# Patient Record
Sex: FEMALE | Race: WHITE | NOT HISPANIC OR LATINO | URBAN - METROPOLITAN AREA
[De-identification: names, ages, dates, MRNs, and addresses within clinical notes are randomized per-mention and may not be internally consistent; named-entity substitution may affect disease eponyms.]

---

## 2022-04-24 ENCOUNTER — INPATIENT (INPATIENT)
Facility: HOSPITAL | Age: 77
LOS: 4 days | Discharge: HOME | DRG: 480 | End: 2022-04-29
Attending: INTERNAL MEDICINE | Admitting: HOSPITALIST
Payer: MEDICARE

## 2022-04-24 ENCOUNTER — TRANSCRIPTION ENCOUNTER (OUTPATIENT)
Age: 77
End: 2022-04-24

## 2022-04-24 VITALS
HEART RATE: 70 BPM | SYSTOLIC BLOOD PRESSURE: 156 MMHG | DIASTOLIC BLOOD PRESSURE: 104 MMHG | RESPIRATION RATE: 20 BRPM | WEIGHT: 100.09 LBS | OXYGEN SATURATION: 96 % | TEMPERATURE: 98 F

## 2022-04-24 DIAGNOSIS — S72.002A FRACTURE OF UNSPECIFIED PART OF NECK OF LEFT FEMUR, INITIAL ENCOUNTER FOR CLOSED FRACTURE: ICD-10-CM

## 2022-04-24 LAB
ALBUMIN SERPL ELPH-MCNC: 3.9 G/DL — SIGNIFICANT CHANGE UP (ref 3.3–5.2)
ALP SERPL-CCNC: 69 U/L — SIGNIFICANT CHANGE UP (ref 40–120)
ALT FLD-CCNC: 8 U/L — SIGNIFICANT CHANGE UP
ANION GAP SERPL CALC-SCNC: 14 MMOL/L — SIGNIFICANT CHANGE UP (ref 5–17)
APTT BLD: 36.9 SEC — HIGH (ref 27.5–35.5)
AST SERPL-CCNC: 17 U/L — SIGNIFICANT CHANGE UP
BASOPHILS # BLD AUTO: 0.06 K/UL — SIGNIFICANT CHANGE UP (ref 0–0.2)
BASOPHILS NFR BLD AUTO: 0.5 % — SIGNIFICANT CHANGE UP (ref 0–2)
BILIRUB SERPL-MCNC: 1 MG/DL — SIGNIFICANT CHANGE UP (ref 0.4–2)
BLD GP AB SCN SERPL QL: SIGNIFICANT CHANGE UP
BUN SERPL-MCNC: 17.5 MG/DL — SIGNIFICANT CHANGE UP (ref 8–20)
CALCIUM SERPL-MCNC: 9.7 MG/DL — SIGNIFICANT CHANGE UP (ref 8.6–10.2)
CHLORIDE SERPL-SCNC: 98 MMOL/L — SIGNIFICANT CHANGE UP (ref 98–107)
CO2 SERPL-SCNC: 22 MMOL/L — SIGNIFICANT CHANGE UP (ref 22–29)
CREAT SERPL-MCNC: 0.62 MG/DL — SIGNIFICANT CHANGE UP (ref 0.5–1.3)
EGFR: 91 ML/MIN/1.73M2 — SIGNIFICANT CHANGE UP
EOSINOPHIL # BLD AUTO: 0.07 K/UL — SIGNIFICANT CHANGE UP (ref 0–0.5)
EOSINOPHIL NFR BLD AUTO: 0.6 % — SIGNIFICANT CHANGE UP (ref 0–6)
FLUAV AG NPH QL: SIGNIFICANT CHANGE UP
FLUBV AG NPH QL: SIGNIFICANT CHANGE UP
GLUCOSE SERPL-MCNC: 99 MG/DL — SIGNIFICANT CHANGE UP (ref 70–99)
HCT VFR BLD CALC: 36.8 % — SIGNIFICANT CHANGE UP (ref 34.5–45)
HGB BLD-MCNC: 12.5 G/DL — SIGNIFICANT CHANGE UP (ref 11.5–15.5)
IMM GRANULOCYTES NFR BLD AUTO: 0.4 % — SIGNIFICANT CHANGE UP (ref 0–1.5)
INR BLD: 1.04 RATIO — SIGNIFICANT CHANGE UP (ref 0.88–1.16)
LYMPHOCYTES # BLD AUTO: 0.71 K/UL — LOW (ref 1–3.3)
LYMPHOCYTES # BLD AUTO: 5.7 % — LOW (ref 13–44)
MCHC RBC-ENTMCNC: 29 PG — SIGNIFICANT CHANGE UP (ref 27–34)
MCHC RBC-ENTMCNC: 34 GM/DL — SIGNIFICANT CHANGE UP (ref 32–36)
MCV RBC AUTO: 85.4 FL — SIGNIFICANT CHANGE UP (ref 80–100)
MONOCYTES # BLD AUTO: 0.81 K/UL — SIGNIFICANT CHANGE UP (ref 0–0.9)
MONOCYTES NFR BLD AUTO: 6.5 % — SIGNIFICANT CHANGE UP (ref 2–14)
NEUTROPHILS # BLD AUTO: 10.77 K/UL — HIGH (ref 1.8–7.4)
NEUTROPHILS NFR BLD AUTO: 86.3 % — HIGH (ref 43–77)
PLATELET # BLD AUTO: 277 K/UL — SIGNIFICANT CHANGE UP (ref 150–400)
POTASSIUM SERPL-MCNC: 4.3 MMOL/L — SIGNIFICANT CHANGE UP (ref 3.5–5.3)
POTASSIUM SERPL-SCNC: 4.3 MMOL/L — SIGNIFICANT CHANGE UP (ref 3.5–5.3)
PROT SERPL-MCNC: 6.9 G/DL — SIGNIFICANT CHANGE UP (ref 6.6–8.7)
PROTHROM AB SERPL-ACNC: 12.1 SEC — SIGNIFICANT CHANGE UP (ref 10.5–13.4)
RBC # BLD: 4.31 M/UL — SIGNIFICANT CHANGE UP (ref 3.8–5.2)
RBC # FLD: 15 % — HIGH (ref 10.3–14.5)
RSV RNA NPH QL NAA+NON-PROBE: SIGNIFICANT CHANGE UP
SARS-COV-2 RNA SPEC QL NAA+PROBE: DETECTED
SODIUM SERPL-SCNC: 134 MMOL/L — LOW (ref 135–145)
TROPONIN T SERPL-MCNC: 0.01 NG/ML — SIGNIFICANT CHANGE UP (ref 0–0.06)
WBC # BLD: 12.47 K/UL — HIGH (ref 3.8–10.5)
WBC # FLD AUTO: 12.47 K/UL — HIGH (ref 3.8–10.5)

## 2022-04-24 PROCEDURE — 71045 X-RAY EXAM CHEST 1 VIEW: CPT | Mod: 26

## 2022-04-24 PROCEDURE — 72125 CT NECK SPINE W/O DYE: CPT | Mod: 26,MG

## 2022-04-24 PROCEDURE — 72190 X-RAY EXAM OF PELVIS: CPT | Mod: 26

## 2022-04-24 PROCEDURE — 70450 CT HEAD/BRAIN W/O DYE: CPT | Mod: 26,MG

## 2022-04-24 PROCEDURE — 99223 1ST HOSP IP/OBS HIGH 75: CPT | Mod: 57

## 2022-04-24 PROCEDURE — 93010 ELECTROCARDIOGRAM REPORT: CPT

## 2022-04-24 PROCEDURE — 99497 ADVNCD CARE PLAN 30 MIN: CPT | Mod: 25

## 2022-04-24 PROCEDURE — 99223 1ST HOSP IP/OBS HIGH 75: CPT

## 2022-04-24 PROCEDURE — G1004: CPT

## 2022-04-24 PROCEDURE — 72192 CT PELVIS W/O DYE: CPT | Mod: 26,MG

## 2022-04-24 PROCEDURE — 99285 EMERGENCY DEPT VISIT HI MDM: CPT

## 2022-04-24 RX ORDER — VANCOMYCIN HCL 1 G
750 VIAL (EA) INTRAVENOUS ONCE
Refills: 0 | Status: DISCONTINUED | OUTPATIENT
Start: 2022-04-25 | End: 2022-04-28

## 2022-04-24 RX ORDER — ENOXAPARIN SODIUM 100 MG/ML
40 INJECTION SUBCUTANEOUS ONCE
Refills: 0 | Status: COMPLETED | OUTPATIENT
Start: 2022-04-24 | End: 2022-04-24

## 2022-04-24 RX ORDER — MORPHINE SULFATE 50 MG/1
2 CAPSULE, EXTENDED RELEASE ORAL ONCE
Refills: 0 | Status: DISCONTINUED | OUTPATIENT
Start: 2022-04-24 | End: 2022-04-24

## 2022-04-24 RX ORDER — LEVOTHYROXINE SODIUM 125 MCG
75 TABLET ORAL DAILY
Refills: 0 | Status: DISCONTINUED | OUTPATIENT
Start: 2022-04-24 | End: 2022-04-26

## 2022-04-24 RX ORDER — LISINOPRIL 2.5 MG/1
10 TABLET ORAL DAILY
Refills: 0 | Status: DISCONTINUED | OUTPATIENT
Start: 2022-04-24 | End: 2022-04-29

## 2022-04-24 RX ORDER — CEFAZOLIN SODIUM 1 G
2000 VIAL (EA) INJECTION ONCE
Refills: 0 | Status: DISCONTINUED | OUTPATIENT
Start: 2022-04-25 | End: 2022-04-28

## 2022-04-24 RX ORDER — ACETAMINOPHEN 500 MG
975 TABLET ORAL ONCE
Refills: 0 | Status: COMPLETED | OUTPATIENT
Start: 2022-04-24 | End: 2022-04-24

## 2022-04-24 RX ORDER — BENAZEPRIL HYDROCHLORIDE 40 MG/1
1 TABLET ORAL
Qty: 0 | Refills: 0 | DISCHARGE

## 2022-04-24 RX ORDER — MUPIROCIN 20 MG/G
1 OINTMENT TOPICAL
Refills: 0 | Status: DISCONTINUED | OUTPATIENT
Start: 2022-04-24 | End: 2022-04-29

## 2022-04-24 RX ORDER — DONEPEZIL HYDROCHLORIDE 10 MG/1
10 TABLET, FILM COATED ORAL AT BEDTIME
Refills: 0 | Status: DISCONTINUED | OUTPATIENT
Start: 2022-04-24 | End: 2022-04-27

## 2022-04-24 RX ORDER — MEMANTINE HYDROCHLORIDE 10 MG/1
10 TABLET ORAL
Refills: 0 | Status: DISCONTINUED | OUTPATIENT
Start: 2022-04-24 | End: 2022-04-27

## 2022-04-24 RX ORDER — RISPERIDONE 4 MG/1
0.5 TABLET ORAL AT BEDTIME
Refills: 0 | Status: DISCONTINUED | OUTPATIENT
Start: 2022-04-24 | End: 2022-04-27

## 2022-04-24 RX ORDER — POVIDONE-IODINE 5 %
1 AEROSOL (ML) TOPICAL ONCE
Refills: 0 | Status: COMPLETED | OUTPATIENT
Start: 2022-04-24 | End: 2022-04-25

## 2022-04-24 RX ORDER — TRANEXAMIC ACID 100 MG/ML
1000 INJECTION, SOLUTION INTRAVENOUS ONCE
Refills: 0 | Status: COMPLETED | OUTPATIENT
Start: 2022-04-24 | End: 2022-04-24

## 2022-04-24 RX ADMIN — MORPHINE SULFATE 2 MILLIGRAM(S): 50 CAPSULE, EXTENDED RELEASE ORAL at 17:39

## 2022-04-24 RX ADMIN — ENOXAPARIN SODIUM 40 MILLIGRAM(S): 100 INJECTION SUBCUTANEOUS at 21:28

## 2022-04-24 RX ADMIN — MORPHINE SULFATE 2 MILLIGRAM(S): 50 CAPSULE, EXTENDED RELEASE ORAL at 17:40

## 2022-04-24 RX ADMIN — Medication 975 MILLIGRAM(S): at 14:31

## 2022-04-24 RX ADMIN — TRANEXAMIC ACID 220 MILLIGRAM(S): 100 INJECTION, SOLUTION INTRAVENOUS at 21:28

## 2022-04-24 NOTE — ED PROVIDER NOTE - ATTENDING CONTRIBUTION TO CARE
Leah: I performed a face to face evaluation of this patient and performed a full history and physical examination on the patient.  I agree with the resident's history, physical examination, and plan of the patient unless otherwise noted. My brief assessment is as follows: hx dementia, with fall from bed this morning. unwitnessed, with skin avulsion to left UE. small bruise to right temporal/forehead region, mild left hip pain/bruising. hasn't tried walking since. at mental baseline. no a/c. no midline neck/spine ttp, ctab, rrr, abd benign, full rom throughout, no deformities, neurovascularly intact. ct head/pelvis, imaging. reassess

## 2022-04-24 NOTE — ED ADULT NURSE NOTE - OBJECTIVE STATEMENT
pt to ED with complaints of fall. PMH Dementia. daughter in law ginacia at bedside. DIL states she has cameras to watch pt and she had a fall this AM in her bedroom. pt unable to recall events. as per DIL pt is acting normal. pt presents with bruising next to right eye. bruising and [id number] skin tear on left arm. slight bruising noted over left hip bone.  pt states she is unable to completely extend her left leg. pt with full movement in all other extremities. pt lying in stretcher A+O x2.  pt denies any pain. left arm skin tear bandaged.

## 2022-04-24 NOTE — ED ADULT NURSE REASSESSMENT NOTE - NS ED NURSE REASSESS COMMENT FT1
Assumed care at 2200, POC reviewed. Pt resting in bed comfortably with daughter in law at bedside. Pt AOx2, respirations even and unlabored on RA, skin warm and dry, bruising present around eye and left hip, skin tear resent on left arm. CM and  in place, bed in the lowest position, side rails up. Assumed care at 2200, POC reviewed. Pt resting in bed comfortably with daughter in law at bedside. Pt AOx2, respirations even and unlabored on RA, skin warm and dry, bruising present around eye and left hip, skin tear resent on left arm, purwick in place, skin dry and intact. CM and  in place, bed in the lowest position, side rails up.

## 2022-04-24 NOTE — ED PROVIDER NOTE - PHYSICAL EXAMINATION
General: well appearing, NAD  Head: NC, AT  EENT: EOMI, no scleral icterus  Cardiac: RRR, no apparent murmurs, no lower extremity edema, 2+ radial pulses b/l   Respiratory: CTABL, no respiratory distress   Abdomen: soft, ND, NT, nonperitonitic  MSK/Vascular: ttp over left greater trochanter   Neuro: AAO to self and place but not time, GCS15   Skin: 10 x 5 cm skin tear to dorsal aspect of left forearm, minimal ecchymosis to r temple  Psych: calm, cooperative

## 2022-04-24 NOTE — H&P ADULT - NSHPPHYSICALEXAM_GEN_ALL_CORE
Vital Signs Last 24 Hrs  T(C): 36.6 (24 Apr 2022 23:24), Max: 37.3 (24 Apr 2022 15:53)  T(F): 97.9 (24 Apr 2022 23:24), Max: 99.2 (24 Apr 2022 15:53)  HR: 75 (24 Apr 2022 23:24) (60 - 75)  BP: 125/84 (24 Apr 2022 23:24) (113/97 - 156/104)  BP(mean): --  RR: 18 (24 Apr 2022 23:24) (17 - 20)  SpO2: 94% (24 Apr 2022 23:24) (94% - 97%)

## 2022-04-24 NOTE — H&P ADULT - NSICDXPASTMEDICALHX_GEN_ALL_CORE_FT
PAST MEDICAL HISTORY:  Dementia     History of asthma     HTN (hypertension)     Hypothyroid     Hypothyroidism

## 2022-04-24 NOTE — GOALS OF CARE CONVERSATION - ADVANCED CARE PLANNING - CONVERSATION DETAILS
Advance care directive discussed with son who is the health care proxy. He said he has not had this discussion with his mother in the past but she would have wanted everything done as possible to save her. Patient is full code.

## 2022-04-24 NOTE — ED PROVIDER NOTE - CLINICAL SUMMARY MEDICAL DECISION MAKING FREE TEXT BOX
76 year old female with history of dementia, asthma, and hypothyroidism who presents following a fall. WBC of 12.4. CT head and c spine unremarkable however CT bony pelvis with L impacted subcapital neck fracture. Ortho consulted. Pt unable to ambulate 2/2 pain. Will admit pt for further care and eval.

## 2022-04-24 NOTE — H&P ADULT - ASSESSMENT
77 y/o female with PMH of dementia, hypothyroidism, HTN was brought to the ED for leg pain s/p fall. Found to have mild impacted subcapital femoral neck fracture on CT pelvis. Ortho on board. CT head/cervical: no acute finding.     Subcapital femoral neck fracture s/p fall   Admit to medical floor   CT pelvis as noted above   Ortho on board; planned for OR in AM   Will check ECG (although marked as completed, no ECG was found in MUSE)   Patient with MET > 4 prior to admission     Leukocytosis   WBC: 12.47 with left shift   Likely reactive   Trend WBC     COVID   Patient asymptomatic   Son and daughter-in-law said patient has no contact with anyone   She is not vaccinated   Will repeat test, hold treatment for now   Isolation precaution     HTN  Benazapril 10mg     Asthma   Albuterol PRN     Hypothyroidism   Synthroid 75mcg     Dementia   Aricept 10mg   Memantine ER 28mg (non-formulary) will give 10mg bid   Risperidone 0.5mg HS     Supportive   DVT prophylaxis: Lovenox once given, please continue after the procedure  Diet: NPO after midnight   Code: full     Plan of care discussed with patient, daughter-in-law (Lisa) at bed side and son (Gonzalez) via the phone ([phone]

## 2022-04-24 NOTE — ED ADULT TRIAGE NOTE - CHIEF COMPLAINT QUOTE
Patient presents to ER foe medical evaluation, sustained fall this Am, skin tear to left arm, no other complaints, no LOC/no blood thinners. no active bleeding noted, dressing placed by family. Patient awake and alert, HX of dementia.

## 2022-04-24 NOTE — H&P ADULT - MUSCULOSKELETAL
no joint swelling/no joint erythema/no joint warmth/no calf tenderness/decreased ROM due to pain detailed exam

## 2022-04-24 NOTE — ED PROVIDER NOTE - PROGRESS NOTE DETAILS
Henry: I rechecked the patient. CT c spine and head CT with no acute findings. CT bony pelvis pending. Henry: ortho consulted as pt found to have impacted L subcapital femur fracture. Leah: pt admitted to medicine, no other traumatic injury.

## 2022-04-24 NOTE — ED PROVIDER NOTE - OBJECTIVE STATEMENT
76 year old female with history of dementia and asthma who presents following a fall. The patient lives with her son and daughter in law. They saw her at ~midnight and she was well. Sometime between midnight and 10am however 76 year old female with history of dementia, asthma, and hypothyroidism who presents following a fall. The patient lives with her son and daughter in law. They saw her at ~midnight and she was well. Sometime between midnight and 10am however she had an unwitnessed fall and sustained ecchymosis to R temple, pain/ecchymosis to L hip, and skin tear of L forearm. Pt's family states that she has been unable to ambulate since the fall and they had difficulty removing her pants this morning because of her left hip pain. They agree she is at her baseline mental status. Pt did not receive covid vaccine. quit smoking ~25 years ago.

## 2022-04-24 NOTE — CONSULT NOTE ADULT - SUBJECTIVE AND OBJECTIVE BOX
Pt Name: MIYA WILKINS    MRN: [id number]      Patient is a 76y Female presenting to the emergency department with a chief complaint of left hip pain s/p mechanical fall yesterday. As per son, pt was able to stand and get back in bed, but would not get out of bed and walk since the fall. Pt with dementia, therefore HPI otherwise unreliable at time of exam.    REVIEW OF SYSTEMS    General: Alert, responsive, in NAD    Skin/Breast:     Ophthalmologic:   	  ENMT:	    Respiratory and Thorax:   	   Cardiovascular:	    Gastrointestinal:	     Genitourinary:     Musculoskeletal: see hpi    Neurological:    Psychiatric:     Hematology/Lymphatics:    Endocrine:         PAST MEDICAL & SURGICAL HISTORY:  PAST MEDICAL & SURGICAL HISTORY:      Allergies: No Known Allergies      Medications: mupirocin 2% Ointment 1 Application(s) Both Nostrils two times a day  povidone iodine 5% Nasal Swab 1 Application(s) Both Nostrils once  tranexamic acid IVPB 1000 milliGRAM(s) IV Intermittent once      FAMILY HISTORY:  : non-contributory    Social History: denies etoh abuse    Ambulation: Walking independently [ x ] With Cane [ ] With Walker [ ]  Bedbound [ ]                           12.5   12.47 )-----------( 277      ( 24 Apr 2022 14:39 )             36.8       04-24    134<L>  |  98  |  17.5  ----------------------------<  99  4.3   |  22.0  |  0.62    Ca    9.7      24 Apr 2022 14:39    TPro  6.9  /  Alb  3.9  /  TBili  1.0  /  DBili  x   /  AST  17  /  ALT  8   /  AlkPhos  69  04-24      Vital Signs Last 24 Hrs  T(C): 36.8 (24 Apr 2022 19:55), Max: 37.3 (24 Apr 2022 15:53)  T(F): 98.3 (24 Apr 2022 19:55), Max: 99.2 (24 Apr 2022 15:53)  HR: 71 (24 Apr 2022 19:55) (60 - 71)  BP: 144/89 (24 Apr 2022 19:55) (113/97 - 156/104)  BP(mean): --  RR: 17 (24 Apr 2022 19:55) (17 - 20)  SpO2: 95% (24 Apr 2022 19:55) (95% - 97%)    Daily     Daily       PHYSICAL EXAM:      Appearance: Alert, responsive, in no acute distress.    Neurological: Sensation is grossly intact to light touch. 5/5 motor function of all extremities. No focal deficits or weaknesses found.    Skin: no rash on visible skin. Skin is clean, dry and intact. No bleeding. No abrasions. No ulcerations.    Vascular: 2+ distal pulses. Cap refill < 2 sec. No signs of venous insufficiency or stasis. No extremity ulcerations. No cyanosis.    Musculoskeletal:         Left Upper Extremity:  + NROM. Non-tender. No signs of trauma.        Right Upper Extremity:  + NROM. Non-tender. No signs of trauma.        Left Lower Extremity:  Pt with knees bent in bed. +EHL/FHL intact. Compartments soft and compressible. No open wounds or active bleeding noted. 2+ DP pulse palpated. No open wounds or active bleeding. SILT.       Right Lower Extremity:  + NROM. Non-tender. No signs of trauma.     Imaging Studies:    A/P:  Pt is a  76y Female with a left subcapital femoral neck fx s/p fall yesterday.    PLAN d/w Dr. Small:   * NPO for OR tomorrow  * IV fluids ordered and to start once NPO  * STAT tranexamic acid - as per hip fx protocol  * Pre-operative ABX ordered  * Single dose anticoaguation ordered  * Anesthesia notified  * Swab- covid +  * Bed rest

## 2022-04-25 ENCOUNTER — TRANSCRIPTION ENCOUNTER (OUTPATIENT)
Age: 77
End: 2022-04-25

## 2022-04-25 LAB
ANION GAP SERPL CALC-SCNC: 16 MMOL/L — SIGNIFICANT CHANGE UP (ref 5–17)
BASE EXCESS BLDA CALC-SCNC: -1.8 MMOL/L — SIGNIFICANT CHANGE UP (ref -2–3)
BLOOD GAS COMMENTS ARTERIAL: SIGNIFICANT CHANGE UP
BUN SERPL-MCNC: 16.9 MG/DL — SIGNIFICANT CHANGE UP (ref 8–20)
CALCIUM SERPL-MCNC: 9.6 MG/DL — SIGNIFICANT CHANGE UP (ref 8.6–10.2)
CHLORIDE SERPL-SCNC: 99 MMOL/L — SIGNIFICANT CHANGE UP (ref 98–107)
CO2 SERPL-SCNC: 21 MMOL/L — LOW (ref 22–29)
CREAT SERPL-MCNC: 0.59 MG/DL — SIGNIFICANT CHANGE UP (ref 0.5–1.3)
EGFR: 92 ML/MIN/1.73M2 — SIGNIFICANT CHANGE UP
GLUCOSE SERPL-MCNC: 84 MG/DL — SIGNIFICANT CHANGE UP (ref 70–99)
HCO3 BLDA-SCNC: 23 MMOL/L — SIGNIFICANT CHANGE UP (ref 21–28)
HCT VFR BLD CALC: 33.4 % — LOW (ref 34.5–45)
HGB BLD-MCNC: 10.9 G/DL — LOW (ref 11.5–15.5)
HOROWITZ INDEX BLDA+IHG-RTO: 21 — SIGNIFICANT CHANGE UP
MCHC RBC-ENTMCNC: 28.7 PG — SIGNIFICANT CHANGE UP (ref 27–34)
MCHC RBC-ENTMCNC: 32.6 GM/DL — SIGNIFICANT CHANGE UP (ref 32–36)
MCV RBC AUTO: 87.9 FL — SIGNIFICANT CHANGE UP (ref 80–100)
PCO2 BLDA: 35 MMHG — SIGNIFICANT CHANGE UP (ref 32–35)
PH BLDA: 7.42 — SIGNIFICANT CHANGE UP (ref 7.35–7.45)
PLATELET # BLD AUTO: 240 K/UL — SIGNIFICANT CHANGE UP (ref 150–400)
PO2 BLDA: 144 MMHG — HIGH (ref 83–108)
POTASSIUM SERPL-MCNC: 3.6 MMOL/L — SIGNIFICANT CHANGE UP (ref 3.5–5.3)
POTASSIUM SERPL-SCNC: 3.6 MMOL/L — SIGNIFICANT CHANGE UP (ref 3.5–5.3)
RBC # BLD: 3.8 M/UL — SIGNIFICANT CHANGE UP (ref 3.8–5.2)
RBC # FLD: 15 % — HIGH (ref 10.3–14.5)
SAO2 % BLDA: 100 % — HIGH (ref 94–98)
SODIUM SERPL-SCNC: 136 MMOL/L — SIGNIFICANT CHANGE UP (ref 135–145)
WBC # BLD: 8.25 K/UL — SIGNIFICANT CHANGE UP (ref 3.8–10.5)
WBC # FLD AUTO: 8.25 K/UL — SIGNIFICANT CHANGE UP (ref 3.8–10.5)

## 2022-04-25 PROCEDURE — 93010 ELECTROCARDIOGRAM REPORT: CPT

## 2022-04-25 PROCEDURE — 99233 SBSQ HOSP IP/OBS HIGH 50: CPT

## 2022-04-25 PROCEDURE — 27236 TREAT THIGH FRACTURE: CPT | Mod: LT

## 2022-04-25 DEVICE — PLATE FEM NECK 1H STRL: Type: IMPLANTABLE DEVICE | Status: FUNCTIONAL

## 2022-04-25 DEVICE — BOLT FEM NECK 80MM STRL: Type: IMPLANTABLE DEVICE | Status: FUNCTIONAL

## 2022-04-25 DEVICE — IMPLANTABLE DEVICE: Type: IMPLANTABLE DEVICE | Status: FUNCTIONAL

## 2022-04-25 DEVICE — GWIRE 3.2X400MM SS: Type: IMPLANTABLE DEVICE | Status: FUNCTIONAL

## 2022-04-25 DEVICE — SCREW ANTIROTAT FEM NECK 80MM STRL: Type: IMPLANTABLE DEVICE | Status: FUNCTIONAL

## 2022-04-25 RX ORDER — ACETAMINOPHEN 500 MG
650 TABLET ORAL EVERY 8 HOURS
Refills: 0 | Status: DISCONTINUED | OUTPATIENT
Start: 2022-04-25 | End: 2022-04-29

## 2022-04-25 RX ORDER — ENOXAPARIN SODIUM 100 MG/ML
40 INJECTION SUBCUTANEOUS EVERY 24 HOURS
Refills: 0 | Status: DISCONTINUED | OUTPATIENT
Start: 2022-04-26 | End: 2022-04-29

## 2022-04-25 RX ORDER — ALBUTEROL 90 UG/1
1 AEROSOL, METERED ORAL THREE TIMES A DAY
Refills: 0 | Status: DISCONTINUED | OUTPATIENT
Start: 2022-04-25 | End: 2022-04-29

## 2022-04-25 RX ORDER — POLYETHYLENE GLYCOL 3350 17 G/17G
17 POWDER, FOR SOLUTION ORAL DAILY
Refills: 0 | Status: DISCONTINUED | OUTPATIENT
Start: 2022-04-25 | End: 2022-04-29

## 2022-04-25 RX ORDER — OXYCODONE HYDROCHLORIDE 5 MG/1
5 TABLET ORAL EVERY 4 HOURS
Refills: 0 | Status: DISCONTINUED | OUTPATIENT
Start: 2022-04-25 | End: 2022-04-29

## 2022-04-25 RX ORDER — ONDANSETRON 8 MG/1
4 TABLET, FILM COATED ORAL EVERY 8 HOURS
Refills: 0 | Status: DISCONTINUED | OUTPATIENT
Start: 2022-04-25 | End: 2022-04-29

## 2022-04-25 RX ORDER — MAGNESIUM HYDROXIDE 400 MG/1
30 TABLET, CHEWABLE ORAL DAILY
Refills: 0 | Status: DISCONTINUED | OUTPATIENT
Start: 2022-04-25 | End: 2022-04-29

## 2022-04-25 RX ORDER — ACETAMINOPHEN 500 MG
650 TABLET ORAL EVERY 6 HOURS
Refills: 0 | Status: DISCONTINUED | OUTPATIENT
Start: 2022-04-25 | End: 2022-04-29

## 2022-04-25 RX ORDER — OXYCODONE HYDROCHLORIDE 5 MG/1
2.5 TABLET ORAL EVERY 4 HOURS
Refills: 0 | Status: DISCONTINUED | OUTPATIENT
Start: 2022-04-25 | End: 2022-04-29

## 2022-04-25 RX ORDER — SODIUM CHLORIDE 9 MG/ML
1000 INJECTION INTRAMUSCULAR; INTRAVENOUS; SUBCUTANEOUS
Refills: 0 | Status: DISCONTINUED | OUTPATIENT
Start: 2022-04-25 | End: 2022-04-28

## 2022-04-25 RX ORDER — LANOLIN ALCOHOL/MO/W.PET/CERES
3 CREAM (GRAM) TOPICAL AT BEDTIME
Refills: 0 | Status: DISCONTINUED | OUTPATIENT
Start: 2022-04-25 | End: 2022-04-29

## 2022-04-25 RX ORDER — ALBUTEROL 90 UG/1
2 AEROSOL, METERED ORAL ONCE
Refills: 0 | Status: COMPLETED | OUTPATIENT
Start: 2022-04-25 | End: 2022-04-25

## 2022-04-25 RX ORDER — POTASSIUM CHLORIDE 20 MEQ
20 PACKET (EA) ORAL ONCE
Refills: 0 | Status: DISCONTINUED | OUTPATIENT
Start: 2022-04-25 | End: 2022-04-26

## 2022-04-25 RX ORDER — SODIUM CHLORIDE 9 MG/ML
1000 INJECTION, SOLUTION INTRAVENOUS
Refills: 0 | Status: DISCONTINUED | OUTPATIENT
Start: 2022-04-25 | End: 2022-04-25

## 2022-04-25 RX ORDER — OXYCODONE HYDROCHLORIDE 5 MG/1
5 TABLET ORAL ONCE
Refills: 0 | Status: DISCONTINUED | OUTPATIENT
Start: 2022-04-25 | End: 2022-04-25

## 2022-04-25 RX ORDER — CEFAZOLIN SODIUM 1 G
2000 VIAL (EA) INJECTION EVERY 8 HOURS
Refills: 0 | Status: COMPLETED | OUTPATIENT
Start: 2022-04-26 | End: 2022-04-26

## 2022-04-25 RX ORDER — SODIUM CHLORIDE 9 MG/ML
1000 INJECTION INTRAMUSCULAR; INTRAVENOUS; SUBCUTANEOUS
Refills: 0 | Status: DISCONTINUED | OUTPATIENT
Start: 2022-04-25 | End: 2022-04-25

## 2022-04-25 RX ORDER — HYDROMORPHONE HYDROCHLORIDE 2 MG/ML
0.5 INJECTION INTRAMUSCULAR; INTRAVENOUS; SUBCUTANEOUS
Refills: 0 | Status: DISCONTINUED | OUTPATIENT
Start: 2022-04-25 | End: 2022-04-25

## 2022-04-25 RX ORDER — ONDANSETRON 8 MG/1
4 TABLET, FILM COATED ORAL EVERY 6 HOURS
Refills: 0 | Status: DISCONTINUED | OUTPATIENT
Start: 2022-04-25 | End: 2022-04-29

## 2022-04-25 RX ORDER — ENOXAPARIN SODIUM 100 MG/ML
40 INJECTION SUBCUTANEOUS EVERY 24 HOURS
Refills: 0 | Status: DISCONTINUED | OUTPATIENT
Start: 2022-04-25 | End: 2022-04-25

## 2022-04-25 RX ORDER — SENNA PLUS 8.6 MG/1
2 TABLET ORAL AT BEDTIME
Refills: 0 | Status: DISCONTINUED | OUTPATIENT
Start: 2022-04-25 | End: 2022-04-29

## 2022-04-25 RX ADMIN — SENNA PLUS 2 TABLET(S): 8.6 TABLET ORAL at 23:14

## 2022-04-25 RX ADMIN — HYDROMORPHONE HYDROCHLORIDE 0.5 MILLIGRAM(S): 2 INJECTION INTRAMUSCULAR; INTRAVENOUS; SUBCUTANEOUS at 19:23

## 2022-04-25 RX ADMIN — HYDROMORPHONE HYDROCHLORIDE 0.5 MILLIGRAM(S): 2 INJECTION INTRAMUSCULAR; INTRAVENOUS; SUBCUTANEOUS at 19:00

## 2022-04-25 RX ADMIN — RISPERIDONE 0.5 MILLIGRAM(S): 4 TABLET ORAL at 23:16

## 2022-04-25 RX ADMIN — LISINOPRIL 10 MILLIGRAM(S): 2.5 TABLET ORAL at 06:16

## 2022-04-25 RX ADMIN — Medication 75 MICROGRAM(S): at 05:50

## 2022-04-25 RX ADMIN — DONEPEZIL HYDROCHLORIDE 10 MILLIGRAM(S): 10 TABLET, FILM COATED ORAL at 23:15

## 2022-04-25 RX ADMIN — Medication 650 MILLIGRAM(S): at 23:45

## 2022-04-25 RX ADMIN — ALBUTEROL 2 PUFF(S): 90 AEROSOL, METERED ORAL at 19:00

## 2022-04-25 RX ADMIN — Medication 650 MILLIGRAM(S): at 23:15

## 2022-04-25 RX ADMIN — Medication 650 MILLIGRAM(S): at 10:57

## 2022-04-25 RX ADMIN — MUPIROCIN 1 APPLICATION(S): 20 OINTMENT TOPICAL at 05:55

## 2022-04-25 RX ADMIN — Medication 1 APPLICATION(S): at 01:29

## 2022-04-25 RX ADMIN — MEMANTINE HYDROCHLORIDE 10 MILLIGRAM(S): 10 TABLET ORAL at 05:50

## 2022-04-25 NOTE — DISCHARGE NOTE PROVIDER - NSDCCPCAREPLAN_GEN_ALL_CORE_FT
PRINCIPAL DISCHARGE DIAGNOSIS  Diagnosis: Fracture of neck of left femur  Assessment and Plan of Treatment: s/p ORIF.   follow up with ortho in 2 weeks      SECONDARY DISCHARGE DIAGNOSES  Diagnosis: Unwitnessed fall  Assessment and Plan of Treatment: fall precautions.  pt

## 2022-04-25 NOTE — ED ADULT NURSE REASSESSMENT NOTE - NS ED NURSE REASSESS COMMENT FT1
Pt sleeping, resting comfortably, arouses to verbal stimuli, AOx2, respirations even and unlabored on RA, skin warm and dry.

## 2022-04-25 NOTE — PROGRESS NOTE ADULT - ASSESSMENT
75 y/o female with PMH of dementia, hypothyroidism, HTN was brought to the ED for leg pain s/p fall. Found to have mild impacted subcapital femoral neck fracture on CT pelvis. Ortho on board. CT head/cervical: no acute finding.     1-Subcapital femoral neck fracture s/p fall at home   orthopedics on board   pt s for OR today   d/w son at the bedside updated plan and treatment       2-Leukocytosis   WBC: 12.47 with left shift   Likely reactive   Trend WBC     3-COVID positive   afebrile   Patient asymptomatic , not hypoxic   isolation precautions     4-HTN  on Benazapril 10mg   controlled   hold post op to avoid hypotension     5-Asthma   Albuterol PRN     6-Hypothyroidism   Synthroid 75mcg   check TSH     7-Dementia   Aricept 10mg   Memantine ER 28mg (non-formulary) will give 10mg bid   Risperidone 0.5mg HS     Supportive   DVT prophylaxis: Lovenox once given  resume post op per surgery team   Diet: NPO   Code: full code     Plan of care discussed with son  at bed side ; son (Gonzalez) via the phone ([phone]     pt is optimized for surgery at present

## 2022-04-25 NOTE — DISCHARGE NOTE PROVIDER - CARE PROVIDERS DIRECT ADDRESSES
[email or web address]_Unknown

## 2022-04-25 NOTE — DISCHARGE NOTE PROVIDER - NSDCMRMEDTOKEN_GEN_ALL_CORE_FT
Aricept 10 mg oral tablet: 1 tab(s) orally once a day (at bedtime)  benazepril 10 mg oral tablet: 1 tab(s) orally once a day  levothyroxine 75 mcg (0.075 mg) oral tablet: 1 tab(s) orally once a day  memantine 28 mg oral capsule, extended release: 1 cap(s) orally once a day  risperiDONE 0.5 mg oral tablet: 1 tab(s) orally once a day (at bedtime)   acetaminophen 325 mg oral tablet: 2 tab(s) orally every 6 hours, As needed, Temp greater or equal to 38C (100.4F), Mild Pain (1 - 3)  albuterol 90 mcg/inh inhalation aerosol: 1 puff(s) inhaled 3 times a day, As needed, Shortness of Breath and/or Wheezing  benazepril 10 mg oral tablet: 1 tab(s) orally once a day  cholecalciferol oral tablet: 800 unit(s) orally once a day  cyanocobalamin 1000 mcg oral tablet: 1 tab(s) orally once a day  enoxaparin: 40 milligram(s) subcutaneous once a day   for dvt ppx   Euthyrox 100 mcg (0.1 mg) oral tablet: 1 tab(s) orally once a day  melatonin 3 mg oral tablet: 1 tab(s) orally once a day (at bedtime), As needed, Insomnia  oxyCODONE 5 mg oral tablet: 1 tab(s) orally every 4 hours, As needed, Severe Pain (7 - 10)  polyethylene glycol 3350 oral powder for reconstitution: 17 gram(s) orally once a day  QUEtiapine 25 mg oral tablet: 1 tab(s) orally 2 times a day  senna oral tablet: 2 tab(s) orally once a day (at bedtime)

## 2022-04-25 NOTE — DISCHARGE NOTE PROVIDER - HOSPITAL COURSE
75 y/o female with PMH of dementia, hypothyroidism, HTN was brought to the ED for leg pain s/p fall. Found to have mild impacted subcapital femoral neck fracture on CT pelvis. Ortho on board. CT head/cervical: no acute finding.  a/w Subcapital left  femoral neck fracture s/p fall at home . now s.p orif. patient also noted to have Leukocytosis / likely reactive / now  resolved .  s/p  periop iv abxs as per ortho as per post op protocol . now completed     patient also tested COVID positive . remained afebrile , Patient asymptomatic , not hypoxic ,  repeat covid 4/29 neg.  discussed with inefction prevention, patient  has two tests negative results and can come off isolation     patient with hx of Hypothyroidism . tsh greater than 40 . patient WAS Synthroid 75mcg --> now  incr to 100mcg    vit b12 def  - replete     agitation / ams / likely delirium with underlying dementia  / now improved . as per family patient gets more agitated with aricept , namanda and risperidal. now started on seroquel bid. improved agitation.     now dcd to yary.

## 2022-04-25 NOTE — DISCHARGE NOTE PROVIDER - NSDCFUADDINST_GEN_ALL_CORE_FT
ORTHOPEDIC FOLLOW UP RECOMMENDATIONS: The patient will be seen in the office between 2-3 weeks for wound check. Sutures/Staples/Tape will be removed at that time. Patient may shower after post-op day #5. The dressing is to be removed on post op day #9. IF THE DRESSING BECOMES SOILED BEFORE THE REMOVAL DATE, CHANGE WITH A SIMILAR DRESSING. IF THE DRESSING BECOMES STAINED WITH DISCHARGE, CONTACT THE OFFICE FOR FURTHER DIRECTIONS.  The patient will contact the office if the wound becomes red, has increasing pain, develops bleeding or discharge, an injury occurs, or has other concerns. The patient will continue PT for gait training. The patient will continue LOVENOX for 4 weeks (or as per primary team) for blood clot prevention. The patient will take Colace while taking oxycodone to prevent narcotic associated constipation.  Additionally, increase water intake (drink at least 8 glasses of water daily) and try adding fiber to the diet by eating fruits, vegetables and foods that are rich in grains. If constipation is experienced, contact the medical/primary care provider to discuss further treatment options. The patient is FULL weight bearing.

## 2022-04-25 NOTE — DISCHARGE NOTE PROVIDER - PROVIDER TOKENS
PROVIDER:[TOKEN:[[id number]]] PROVIDER:[TOKEN:[[id number]],FOLLOWUP:[2 weeks]],FREE:[LAST:[primary care],PHONE:[(   )    -],FAX:[(   )    -],FOLLOWUP:[1-3 days]]

## 2022-04-25 NOTE — DISCHARGE NOTE PROVIDER - CARE PROVIDER_API CALL
Leonid Freeman)  Orthopaedic Surgery  [address] [place] [address]  Phone: [phone]  Fax: [phone]  Follow Up Time:    Leonid Freeman)  Orthopaedic Surgery  [address] [place] [address]  Phone: [phone]  Fax: [phone]  Follow Up Time: 2 weeks    primary care,   Phone: (   )    -  Fax: (   )    -  Follow Up Time: 1-3 days

## 2022-04-25 NOTE — ED ADULT NURSE REASSESSMENT NOTE - COMFORT CARE
plan of care explained/po fluids offered/repositioned/warm blanket provided
plan of care explained/repositioned
no

## 2022-04-25 NOTE — PROGRESS NOTE ADULT - SUBJECTIVE AND OBJECTIVE BOX
Patient is a 76y old  Female who presents with a chief complaint of     Patient seen and examined at bedside. No overnight events reported.     ALLERGIES:  No Known Allergies    MEDICATIONS  (STANDING):  ceFAZolin   IVPB 2000 milliGRAM(s) IV Intermittent once  donepezil 10 milliGRAM(s) Oral at bedtime  levothyroxine 75 MICROGram(s) Oral daily  lisinopril 10 milliGRAM(s) Oral daily  memantine 10 milliGRAM(s) Oral two times a day  mupirocin 2% Ointment 1 Application(s) Both Nostrils two times a day  risperiDONE   Tablet 0.5 milliGRAM(s) Oral at bedtime  sodium chloride 0.9%. 1000 milliLiter(s) (75 mL/Hr) IV Continuous <Continuous>  vancomycin  IVPB 750 milliGRAM(s) IV Intermittent once    MEDICATIONS  (PRN):  acetaminophen     Tablet .. 650 milliGRAM(s) Oral every 6 hours PRN Temp greater or equal to 38C (100.4F), Mild Pain (1 - 3)  ALBUTerol    90 MICROgram(s) HFA Inhaler 1 Puff(s) Inhalation three times a day PRN Shortness of Breath and/or Wheezing  aluminum hydroxide/magnesium hydroxide/simethicone Suspension 30 milliLiter(s) Oral every 4 hours PRN Dyspepsia  melatonin 3 milliGRAM(s) Oral at bedtime PRN Insomnia  ondansetron Injectable 4 milliGRAM(s) IV Push every 8 hours PRN Nausea and/or Vomiting    Vital Signs Last 24 Hrs  T(F): 97.6 (25 Apr 2022 11:00), Max: 99.2 (24 Apr 2022 15:53)  HR: 69 (25 Apr 2022 11:00) (60 - 79)  BP: 126/81 (25 Apr 2022 11:00) (113/97 - 156/104)  RR: 18 (25 Apr 2022 11:00) (17 - 20)  SpO2: 99% (25 Apr 2022 11:00) (94% - 99%)  I&O's Summary      PHYSICAL EXAM:  General:   ENT:   Neck:   Lungs:   Cardio: RRR, S1/S2, No murmur  Abdomen: Soft, Nontender, Nondistended; Bowel sounds present  Extremities: No calf tenderness, No pitting edema    LABS:                        10.9   8.25  )-----------( 240      ( 25 Apr 2022 03:54 )             33.4     04-25    136  |  99  |  16.9  ----------------------------<  84  3.6   |  21.0  |  0.59    Ca    9.6      25 Apr 2022 03:54    TPro  6.9  /  Alb  3.9  /  TBili  1.0  /  DBili  x   /  AST  17  /  ALT  8   /  AlkPhos  69  04-24          PT/INR - ( 24 Apr 2022 22:14 )   PT: 12.1 sec;   INR: 1.04 ratio         PTT - ( 24 Apr 2022 22:14 )  PTT:36.9 sec                                  RADIOLOGY & ADDITIONAL TESTS:       Patient is a 76y old  Female admitted for fall femur fracture   pt is with dementia poor historian   denies any pain at present seen in am       chart H and P reviewed     ALLERGIES:  No Known Allergies    MEDICATIONS  (STANDING):  ceFAZolin   IVPB 2000 milliGRAM(s) IV Intermittent once  donepezil 10 milliGRAM(s) Oral at bedtime  levothyroxine 75 MICROGram(s) Oral daily  lisinopril 10 milliGRAM(s) Oral daily  memantine 10 milliGRAM(s) Oral two times a day  mupirocin 2% Ointment 1 Application(s) Both Nostrils two times a day  risperiDONE   Tablet 0.5 milliGRAM(s) Oral at bedtime  sodium chloride 0.9%. 1000 milliLiter(s) (75 mL/Hr) IV Continuous <Continuous>  vancomycin  IVPB 750 milliGRAM(s) IV Intermittent once    MEDICATIONS  (PRN):  acetaminophen     Tablet .. 650 milliGRAM(s) Oral every 6 hours PRN Temp greater or equal to 38C (100.4F), Mild Pain (1 - 3)  ALBUTerol    90 MICROgram(s) HFA Inhaler 1 Puff(s) Inhalation three times a day PRN Shortness of Breath and/or Wheezing  aluminum hydroxide/magnesium hydroxide/simethicone Suspension 30 milliLiter(s) Oral every 4 hours PRN Dyspepsia  melatonin 3 milliGRAM(s) Oral at bedtime PRN Insomnia  ondansetron Injectable 4 milliGRAM(s) IV Push every 8 hours PRN Nausea and/or Vomiting    Vital Signs Last 24 Hrs  T(F): 97.6 (25 Apr 2022 11:00), Max: 99.2 (24 Apr 2022 15:53)  HR: 69 (25 Apr 2022 11:00) (60 - 79)  BP: 126/81 (25 Apr 2022 11:00) (113/97 - 156/104)  RR: 18 (25 Apr 2022 11:00) (17 - 20)  SpO2: 99% (25 Apr 2022 11:00) (94% - 99%)  I&O's Summary      PHYSICAL EXAM:  General: awake alert , no distress   Neck: supple, no JVD   Lungs: CTA bilateral   Cardio: RRR, S1/S2, No murmur  Abdomen: Soft, Nontender, Nondistended; Bowel sounds present  Extremities: No calf tenderness, No pitting edema  left forearm dressing in place       LABS:                        10.9   8.25  )-----------( 240      ( 25 Apr 2022 03:54 )             33.4     04-25    136  |  99  |  16.9  ----------------------------<  84  3.6   |  21.0  |  0.59    Ca    9.6      25 Apr 2022 03:54    TPro  6.9  /  Alb  3.9  /  TBili  1.0  /  DBili  x   /  AST  17  /  ALT  8   /  AlkPhos  69  04-24          PT/INR - ( 24 Apr 2022 22:14 )   PT: 12.1 sec;   INR: 1.04 ratio         PTT - ( 24 Apr 2022 22:14 )  PTT:36.9 sec                                  RADIOLOGY & ADDITIONAL TESTS:

## 2022-04-25 NOTE — BRIEF OPERATIVE NOTE - COMMENTS
post-op plan: WBAT, PT/OT, ancef per protocol, contralateral SCD, DVT prophy per medical team, likely f/u prn due to health issues and COVID 19 pandemic

## 2022-04-25 NOTE — PROGRESS NOTE ADULT - SUBJECTIVE AND OBJECTIVE BOX
Orthopedic PA Postop Note  Patient S/P LEFT HIP FNS  Patient in bed comfortable   LEFT Leg  Dressing C/D/I  DP Pulse intact  Calf Soft NT  Dorsi/Plantar Flexion/EHL/FHL Intact  Sensation intact to light touch    Vital Signs Last 24 Hrs  T(C): 36.7 (25 Apr 2022 14:20), Max: 36.8 (24 Apr 2022 19:55)  T(F): 98 (25 Apr 2022 14:20), Max: 98.3 (24 Apr 2022 19:55)  HR: 70 (25 Apr 2022 14:20) (69 - 79)  BP: 131/74 (25 Apr 2022 14:20) (125/84 - 155/85)  BP(mean): --  RR: 16 (25 Apr 2022 14:20) (16 - 18)  SpO2: 98% (25 Apr 2022 14:20) (94% - 99%)        A/P:  S/P LEFT HIP FNS  1. DVTP - LOVENOX  2. Physical Therapy   3. Pain Control as clinically indicated

## 2022-04-25 NOTE — DISCHARGE NOTE PROVIDER - ATTENDING DISCHARGE PHYSICAL EXAMINATION:
PHYSICAL EXAM:  General: awake, confused, no distress   Neck: supple, no JVD   Lungs: CTA bilateral   Cardio: RRR, S1/S2, No murmur  Abdomen: Soft, Nontender, Nondistended; Bowel sounds present  Extremities: No calf tenderness, No pitting edema  Neruro aao x2 -3 pleasant

## 2022-04-26 LAB
24R-OH-CALCIDIOL SERPL-MCNC: 13.4 NG/ML — LOW (ref 30–80)
ANION GAP SERPL CALC-SCNC: 18 MMOL/L — HIGH (ref 5–17)
BUN SERPL-MCNC: 15.5 MG/DL — SIGNIFICANT CHANGE UP (ref 8–20)
CALCIUM SERPL-MCNC: 9.1 MG/DL — SIGNIFICANT CHANGE UP (ref 8.6–10.2)
CHLORIDE SERPL-SCNC: 98 MMOL/L — SIGNIFICANT CHANGE UP (ref 98–107)
CO2 SERPL-SCNC: 19 MMOL/L — LOW (ref 22–29)
CREAT SERPL-MCNC: 0.76 MG/DL — SIGNIFICANT CHANGE UP (ref 0.5–1.3)
EGFR: 80 ML/MIN/1.73M2 — SIGNIFICANT CHANGE UP
FOLATE SERPL-MCNC: 7.2 NG/ML — SIGNIFICANT CHANGE UP
GLUCOSE SERPL-MCNC: 87 MG/DL — SIGNIFICANT CHANGE UP (ref 70–99)
HCT VFR BLD CALC: 34.1 % — LOW (ref 34.5–45)
HGB BLD-MCNC: 10.8 G/DL — LOW (ref 11.5–15.5)
IRON SATN MFR SERPL: 26 UG/DL — LOW (ref 37–145)
IRON SATN MFR SERPL: 9 % — LOW (ref 14–50)
MCHC RBC-ENTMCNC: 28.3 PG — SIGNIFICANT CHANGE UP (ref 27–34)
MCHC RBC-ENTMCNC: 31.7 GM/DL — LOW (ref 32–36)
MCV RBC AUTO: 89.3 FL — SIGNIFICANT CHANGE UP (ref 80–100)
PHOSPHATE SERPL-MCNC: 3.3 MG/DL — SIGNIFICANT CHANGE UP (ref 2.4–4.7)
PLATELET # BLD AUTO: 247 K/UL — SIGNIFICANT CHANGE UP (ref 150–400)
POTASSIUM SERPL-MCNC: 3.9 MMOL/L — SIGNIFICANT CHANGE UP (ref 3.5–5.3)
POTASSIUM SERPL-SCNC: 3.9 MMOL/L — SIGNIFICANT CHANGE UP (ref 3.5–5.3)
RBC # BLD: 3.82 M/UL — SIGNIFICANT CHANGE UP (ref 3.8–5.2)
RBC # FLD: 14.9 % — HIGH (ref 10.3–14.5)
SODIUM SERPL-SCNC: 135 MMOL/L — SIGNIFICANT CHANGE UP (ref 135–145)
TIBC SERPL-MCNC: 297 UG/DL — SIGNIFICANT CHANGE UP (ref 220–430)
TRANSFERRIN SERPL-MCNC: 208 MG/DL — SIGNIFICANT CHANGE UP (ref 192–382)
TSH SERPL-MCNC: 40.32 UIU/ML — HIGH (ref 0.27–4.2)
VIT B12 SERPL-MCNC: 273 PG/ML — SIGNIFICANT CHANGE UP (ref 232–1245)
WBC # BLD: 9.66 K/UL — SIGNIFICANT CHANGE UP (ref 3.8–10.5)
WBC # FLD AUTO: 9.66 K/UL — SIGNIFICANT CHANGE UP (ref 3.8–10.5)

## 2022-04-26 PROCEDURE — 99232 SBSQ HOSP IP/OBS MODERATE 35: CPT

## 2022-04-26 PROCEDURE — 36000 PLACE NEEDLE IN VEIN: CPT

## 2022-04-26 RX ORDER — IRON SUCROSE 20 MG/ML
200 INJECTION, SOLUTION INTRAVENOUS EVERY 24 HOURS
Refills: 0 | Status: COMPLETED | OUTPATIENT
Start: 2022-04-26 | End: 2022-04-28

## 2022-04-26 RX ORDER — LEVOTHYROXINE SODIUM 125 MCG
100 TABLET ORAL DAILY
Refills: 0 | Status: DISCONTINUED | OUTPATIENT
Start: 2022-04-26 | End: 2022-04-29

## 2022-04-26 RX ORDER — PREGABALIN 225 MG/1
1000 CAPSULE ORAL DAILY
Refills: 0 | Status: DISCONTINUED | OUTPATIENT
Start: 2022-04-26 | End: 2022-04-29

## 2022-04-26 RX ADMIN — LISINOPRIL 10 MILLIGRAM(S): 2.5 TABLET ORAL at 05:09

## 2022-04-26 RX ADMIN — RISPERIDONE 0.5 MILLIGRAM(S): 4 TABLET ORAL at 22:08

## 2022-04-26 RX ADMIN — Medication 650 MILLIGRAM(S): at 22:08

## 2022-04-26 RX ADMIN — MEMANTINE HYDROCHLORIDE 10 MILLIGRAM(S): 10 TABLET ORAL at 18:13

## 2022-04-26 RX ADMIN — DONEPEZIL HYDROCHLORIDE 10 MILLIGRAM(S): 10 TABLET, FILM COATED ORAL at 22:09

## 2022-04-26 RX ADMIN — Medication 100 MILLIGRAM(S): at 10:44

## 2022-04-26 RX ADMIN — SODIUM CHLORIDE 75 MILLILITER(S): 9 INJECTION INTRAMUSCULAR; INTRAVENOUS; SUBCUTANEOUS at 01:18

## 2022-04-26 RX ADMIN — OXYCODONE HYDROCHLORIDE 2.5 MILLIGRAM(S): 5 TABLET ORAL at 02:58

## 2022-04-26 RX ADMIN — MUPIROCIN 1 APPLICATION(S): 20 OINTMENT TOPICAL at 18:14

## 2022-04-26 RX ADMIN — MEMANTINE HYDROCHLORIDE 10 MILLIGRAM(S): 10 TABLET ORAL at 05:12

## 2022-04-26 RX ADMIN — IRON SUCROSE 110 MILLIGRAM(S): 20 INJECTION, SOLUTION INTRAVENOUS at 11:14

## 2022-04-26 RX ADMIN — ENOXAPARIN SODIUM 40 MILLIGRAM(S): 100 INJECTION SUBCUTANEOUS at 05:09

## 2022-04-26 RX ADMIN — SENNA PLUS 2 TABLET(S): 8.6 TABLET ORAL at 22:09

## 2022-04-26 RX ADMIN — Medication 650 MILLIGRAM(S): at 23:06

## 2022-04-26 RX ADMIN — Medication 3 MILLIGRAM(S): at 22:08

## 2022-04-26 RX ADMIN — Medication 75 MICROGRAM(S): at 05:09

## 2022-04-26 RX ADMIN — Medication 100 MILLIGRAM(S): at 01:17

## 2022-04-26 RX ADMIN — MUPIROCIN 1 APPLICATION(S): 20 OINTMENT TOPICAL at 06:52

## 2022-04-26 NOTE — OCCUPATIONAL THERAPY INITIAL EVALUATION ADULT - REFERRAL TO ANOTHER SERVICE NEEDED, OT EVAL
caregiver respite resources - son describes progressive difficulty providing level of care pt needs.  "I want what's best for her.  I'm not sure what that is."/social work

## 2022-04-26 NOTE — PHYSICAL THERAPY INITIAL EVALUATION ADULT - ADDITIONAL COMMENTS
Pt is a poor historian. States she lives with her son, who works during the day, in a house with 2 KITTY and a flight of stairs to bedroom. No DME.

## 2022-04-26 NOTE — OCCUPATIONAL THERAPY INITIAL EVALUATION ADULT - VISUAL ACUITY
Pt reports having glasses (for watching TV) but unable to locate since fall.  Pt's son will further investigate to allow her to watch TV with more ease.

## 2022-04-26 NOTE — OCCUPATIONAL THERAPY INITIAL EVALUATION ADULT - BED MOBILITY/TRANSFERS, PREVIOUS LEVEL OF FUNCTION, OT EVAL
pt's son describes pt as needing distant supervision.  She tends to push medical equipment away despite rationale for use.

## 2022-04-26 NOTE — PROGRESS NOTE ADULT - NUTRITIONAL ASSESSMENT
75 y/o female with PMH of dementia, hypothyroidism, HTN was brought to the ED for leg pain s/p fall. Found to have mild impacted subcapital femoral neck fracture on CT pelvis. Ortho on board. CT head/cervical: no acute finding.     1-Subcapital femoral neck fracture s/p fall at home   orthopedics on board   pt s for OR today   d/w son at the bedside updated plan and treatment       2-Leukocytosis   WBC: 12.47 with left shift   Likely reactive   Trend WBC     3-COVID positive   afebrile   Patient asymptomatic , not hypoxic   isolation precautions     4-HTN  on Benazapril 10mg   controlled   hold post op to avoid hypotension     5-Asthma   Albuterol PRN     6-Hypothyroidism   Synthroid 75mcg   check TSH     7-Dementia   Aricept 10mg   Memantine ER 28mg (non-formulary) will give 10mg bid   Risperidone 0.5mg HS     Supportive   DVT prophylaxis: Lovenox once given  resume post op per surgery team   Diet: NPO   Code: full code     Plan of care discussed with son  at bed side ; son (Gonzalez) via the phone ([phone]     pt is optimized for surgery at present

## 2022-04-26 NOTE — OCCUPATIONAL THERAPY INITIAL EVALUATION ADULT - ORIENTATION, REHAB EVAL
At time of eval, son present.  Pt states she's in "hospital" and keeps referring to "[employer]" which is where she worked  as per son.  Pt believes she still works./person/place

## 2022-04-26 NOTE — OCCUPATIONAL THERAPY INITIAL EVALUATION ADULT - ADDITIONAL COMMENTS
Pt unable to provide reliable information at time of eval.  Pt's son present and provided the following information:  Pt lives in private home with 2 steps to enter, no HR.  Once inside there are no stairs she needs to manage.  Pt relies on family for community mobility.  She no longer drives.  Pt already has RW, SAC, commode, tub bench, grab bar in tub.  However, pt does not want to use devices and sporadically removes them from her living area.  Pt's son reports having cameras in all rooms of pt's living area to keep an eye on pt.  Son works nights, daughter in law works days and other family assist as needed to provide supervision.  Son reports "it's getting harder to keep her safe.  I need to make more adjustments to the home but I'm not sure what's best for her."  Son reports pt's sleeping schedule as chaotic, some nights she'll sleep 20 hours, other nights she doesn't sleep at all.  Pt resistive to assist due to "pride."

## 2022-04-26 NOTE — OCCUPATIONAL THERAPY INITIAL EVALUATION ADULT - RANGE OF MOTION EXAMINATION, UPPER EXTREMITY
arthritic changes observed bilateral hands but ROM WFL./bilateral UE Active ROM was WNL (within normal limits)

## 2022-04-26 NOTE — PROGRESS NOTE ADULT - SUBJECTIVE AND OBJECTIVE BOX
ORTHOPEDIC POST-OP PROGRESS NOTE:    Name: MIYA WILKINS    MR #: [id number]    Procedure: Left hip femoral neck system  Surgeon: Lydia COTO  DOS: 4/25/22      Pt comfortable without complaints, pain controlled. Denies numbness/tingling. patient will be participating in PT.    Vital Signs Last 24 Hrs  T(C): 36.5 (04-26-22 @ 05:42), Max: 36.5 (04-26-22 @ 05:42)  T(F): 97.7 (04-26-22 @ 05:42), Max: 97.7 (04-26-22 @ 05:42)  HR: 71 (04-26-22 @ 05:42) (71 - 71)  BP: 108/65 (04-26-22 @ 05:42) (108/65 - 108/65)  BP(mean): --  RR: 18 (04-26-22 @ 05:42) (18 - 18)  SpO2: 93% (04-26-22 @ 05:42) (93% - 93%)    General Exam:    General: Pt Alert and oriented, NAD, controlled pain.    LLE:   Dressings C/D/I. No bleeding. No erythema.    Visible Skin: No erythema. No wound dehiscence    Pulses: 2+ dorsalis pedis pulse. Cap refill < 2 sec.    Sensation: Grossly intact to light touch without deficit.    Motor: +EHL/FHL/AT/GC        A/P: 76y Female  POD#1  s/p Left hip FNS    - ortho Stable  - Pain Control  - DVT ppx as prescribed  - PT eval  - Weight bearing status: WBAT  - continue rest of care per primary team

## 2022-04-26 NOTE — PROGRESS NOTE ADULT - SUBJECTIVE AND OBJECTIVE BOX
MIYA WILKINS    [id number]    76y      Female    INTERVAL HPI/OVERNIGHT EVENTS: patient being seen for femur fracture, patient is s.p orif post op day #1    patient seen at bedside and is pleasantly confused    REVIEW OF SYSTEMS:    unable to obtain accurate ros, patient denies pain     Vital Signs Last 24 Hrs  T(C): 36.6 (26 Apr 2022 11:35), Max: 36.8 (25 Apr 2022 18:30)  T(F): 97.8 (26 Apr 2022 11:35), Max: 98.2 (25 Apr 2022 18:30)  HR: 94 (26 Apr 2022 11:35) (70 - 94)  BP: 144/88 (26 Apr 2022 11:35) (108/65 - 188/85)  BP(mean): --  RR: 18 (26 Apr 2022 11:35) (13 - 18)  SpO2: 92% (26 Apr 2022 11:35) (92% - 100%)    PHYSICAL EXAM:    General: awake, confused, no distress   Neck: supple, no JVD   Lungs: CTA bilateral   Cardio: RRR, S1/S2, No murmur  Abdomen: Soft, Nontender, Nondistended; Bowel sounds present  Extremities: No calf tenderness, No pitting edema  Neruro aao x1-2    LABS:                        10.8   9.66  )-----------( 247      ( 26 Apr 2022 06:24 )             34.1     04-26    135  |  98  |  15.5  ----------------------------<  87  3.9   |  19.0<L>  |  0.76    Ca    9.1      26 Apr 2022 06:24  Phos  3.3     04-26    TPro  6.9  /  Alb  3.9  /  TBili  1.0  /  DBili  x   /  AST  17  /  ALT  8   /  AlkPhos  69  04-24    PT/INR - ( 24 Apr 2022 22:14 )   PT: 12.1 sec;   INR: 1.04 ratio         PTT - ( 24 Apr 2022 22:14 )  PTT:36.9 sec        MEDICATIONS  (STANDING):  acetaminophen     Tablet .. 650 milliGRAM(s) Oral every 8 hours  ceFAZolin   IVPB 2000 milliGRAM(s) IV Intermittent once  cyanocobalamin 1000 MICROGram(s) Oral daily  donepezil 10 milliGRAM(s) Oral at bedtime  enoxaparin Injectable 40 milliGRAM(s) SubCutaneous every 24 hours  iron sucrose IVPB 200 milliGRAM(s) IV Intermittent every 24 hours  levothyroxine 100 MICROGram(s) Oral daily  lisinopril 10 milliGRAM(s) Oral daily  memantine 10 milliGRAM(s) Oral two times a day  mupirocin 2% Ointment 1 Application(s) Both Nostrils two times a day  polyethylene glycol 3350 17 Gram(s) Oral daily  risperiDONE   Tablet 0.5 milliGRAM(s) Oral at bedtime  senna 2 Tablet(s) Oral at bedtime  sodium chloride 0.9%. 1000 milliLiter(s) (75 mL/Hr) IV Continuous <Continuous>  vancomycin  IVPB 750 milliGRAM(s) IV Intermittent once    MEDICATIONS  (PRN):  acetaminophen     Tablet .. 650 milliGRAM(s) Oral every 6 hours PRN Temp greater or equal to 38C (100.4F), Mild Pain (1 - 3)  ALBUTerol    90 MICROgram(s) HFA Inhaler 1 Puff(s) Inhalation three times a day PRN Shortness of Breath and/or Wheezing  aluminum hydroxide/magnesium hydroxide/simethicone Suspension 30 milliLiter(s) Oral every 4 hours PRN Dyspepsia  magnesium hydroxide Suspension 30 milliLiter(s) Oral daily PRN Constipation  melatonin 3 milliGRAM(s) Oral at bedtime PRN Insomnia  melatonin 3 milliGRAM(s) Oral at bedtime PRN Insomnia  ondansetron Injectable 4 milliGRAM(s) IV Push every 8 hours PRN Nausea and/or Vomiting  ondansetron Injectable 4 milliGRAM(s) IV Push every 6 hours PRN Nausea and/or Vomiting  oxyCODONE    IR 2.5 milliGRAM(s) Oral every 4 hours PRN Moderate Pain (4 - 6)  oxyCODONE    IR 5 milliGRAM(s) Oral every 4 hours PRN Severe Pain (7 - 10)      RADIOLOGY & ADDITIONAL TESTS:

## 2022-04-26 NOTE — PROGRESS NOTE ADULT - ASSESSMENT
MIYA WILKINS    [id number]    76y      Female    INTERVAL HPI/OVERNIGHT EVENTS: patient being seen for femur fracture s/p orif post op day #1, asymptomatic covid and dementia.     patient seen at bedside and is in nad, pleasantly demented    REVIEW OF SYSTEMS:    unable to obtain secondary to metnal status    Vital Signs Last 24 Hrs  T(C): 36.6 (26 Apr 2022 11:35), Max: 36.8 (25 Apr 2022 18:30)  T(F): 97.8 (26 Apr 2022 11:35), Max: 98.2 (25 Apr 2022 18:30)  HR: 94 (26 Apr 2022 11:35) (70 - 94)  BP: 144/88 (26 Apr 2022 11:35) (108/65 - 188/85)  BP(mean): --  RR: 18 (26 Apr 2022 11:35) (13 - 18)  SpO2: 92% (26 Apr 2022 11:35) (92% - 100%)    PHYSICAL EXAM:    General: awake alert , no distress   Neck: supple, no JVD   Lungs: CTA bilateral   Cardio: RRR, S1/S2, No murmur  Abdomen: Soft, Nontender, Nondistended; Bowel sounds present  Extremities: No calf tenderness, No pitting edema, dressing clean   neuro aao x1         LABS:                        10.8   9.66  )-----------( 247      ( 26 Apr 2022 06:24 )             34.1     04-26    135  |  98  |  15.5  ----------------------------<  87  3.9   |  19.0<L>  |  0.76    Ca    9.1      26 Apr 2022 06:24  Phos  3.3     04-26    TPro  6.9  /  Alb  3.9  /  TBili  1.0  /  DBili  x   /  AST  17  /  ALT  8   /  AlkPhos  69  04-24    PT/INR - ( 24 Apr 2022 22:14 )   PT: 12.1 sec;   INR: 1.04 ratio         PTT - ( 24 Apr 2022 22:14 )  PTT:36.9 sec        MEDICATIONS  (STANDING):  acetaminophen     Tablet .. 650 milliGRAM(s) Oral every 8 hours  ceFAZolin   IVPB 2000 milliGRAM(s) IV Intermittent once  cyanocobalamin 1000 MICROGram(s) Oral daily  donepezil 10 milliGRAM(s) Oral at bedtime  enoxaparin Injectable 40 milliGRAM(s) SubCutaneous every 24 hours  iron sucrose IVPB 200 milliGRAM(s) IV Intermittent every 24 hours  levothyroxine 100 MICROGram(s) Oral daily  lisinopril 10 milliGRAM(s) Oral daily  memantine 10 milliGRAM(s) Oral two times a day  mupirocin 2% Ointment 1 Application(s) Both Nostrils two times a day  polyethylene glycol 3350 17 Gram(s) Oral daily  risperiDONE   Tablet 0.5 milliGRAM(s) Oral at bedtime  senna 2 Tablet(s) Oral at bedtime  sodium chloride 0.9%. 1000 milliLiter(s) (75 mL/Hr) IV Continuous <Continuous>  vancomycin  IVPB 750 milliGRAM(s) IV Intermittent once    MEDICATIONS  (PRN):  acetaminophen     Tablet .. 650 milliGRAM(s) Oral every 6 hours PRN Temp greater or equal to 38C (100.4F), Mild Pain (1 - 3)  ALBUTerol    90 MICROgram(s) HFA Inhaler 1 Puff(s) Inhalation three times a day PRN Shortness of Breath and/or Wheezing  aluminum hydroxide/magnesium hydroxide/simethicone Suspension 30 milliLiter(s) Oral every 4 hours PRN Dyspepsia  magnesium hydroxide Suspension 30 milliLiter(s) Oral daily PRN Constipation  melatonin 3 milliGRAM(s) Oral at bedtime PRN Insomnia  melatonin 3 milliGRAM(s) Oral at bedtime PRN Insomnia  ondansetron Injectable 4 milliGRAM(s) IV Push every 8 hours PRN Nausea and/or Vomiting  ondansetron Injectable 4 milliGRAM(s) IV Push every 6 hours PRN Nausea and/or Vomiting  oxyCODONE    IR 2.5 milliGRAM(s) Oral every 4 hours PRN Moderate Pain (4 - 6)  oxyCODONE    IR 5 milliGRAM(s) Oral every 4 hours PRN Severe Pain (7 - 10)      RADIOLOGY & ADDITIONAL TESTS:   77 y/o female with PMH of dementia, hypothyroidism, HTN was brought to the ED for leg pain s/p fall. Found to have mild impacted subcapital femoral neck fracture on CT pelvis. Ortho on board. CT head/cervical: no acute finding. Patient is s.p orif post op day #1    #Subcapital femoral neck fracture s/p fall at home   orthopedics following  - s.p orif post opd ay #1  - dvt prop and pain control     #Leukocytosis   WBC: 12.47 with left shift   resolved    #COVID positive   afebrile   Patient asymptomatic , not hypoxic   isolation precautions   - repeat covid for am     #HTN  on Benazapril 10mg   controlled     #Hypothyroidism   tsh greater than 40   - send thyroid panel for am   Synthroid 75mcg --> incr to 100mcg    #vit b12 def  - replete    #Dementia   Aricept 10mg   Memantine ER 28mg (non-formulary) will give 10mg bid   Risperidone 0.5mg HS     Supportive   DVT prophylaxis: Lovenox   Code: full code     left message with son  (Gonzalez) via the phone ([phone]   pt consult and will vandana ivory yary

## 2022-04-26 NOTE — OCCUPATIONAL THERAPY INITIAL EVALUATION ADULT - LEVEL OF INDEPENDENCE: DRESS LOWER BODY, OT EVAL
guarding to don/doff socks.  Assist with pants and diaper management./moderate assist (50% patients effort)

## 2022-04-26 NOTE — OCCUPATIONAL THERAPY INITIAL EVALUATION ADULT - PERSONAL SAFETY AND JUDGMENT, REHAB EVAL
resistive to assist at times, resistive to use of medical devices, decreased awareness of functional status/impaired

## 2022-04-26 NOTE — PROCEDURE NOTE - ADDITIONAL PROCEDURE DETAILS
US Guided 20g IV placed in right upper arm. +flash, good blood return, flushes easily. Patient tolerated procedure well with no immediate complications. Tourniquet removed, all sharps disposed of appropriately, bed rails raised and bed lowered.

## 2022-04-26 NOTE — OCCUPATIONAL THERAPY INITIAL EVALUATION ADULT - ANTICIPATED DISCHARGE DISPOSITION, OT EVAL
Home with 24/7 assist vs MIESHA pending pt's functional progress and family's ability to provide this level of assist.

## 2022-04-26 NOTE — PHYSICAL THERAPY INITIAL EVALUATION ADULT - PLANNED THERAPY INTERVENTIONS, PT EVAL
stairs/balance training/bed mobility training/gait training/postural re-education/ROM/strengthening/transfer training

## 2022-04-27 LAB
ALBUMIN SERPL ELPH-MCNC: 3.5 G/DL — SIGNIFICANT CHANGE UP (ref 3.3–5.2)
ALP SERPL-CCNC: 64 U/L — SIGNIFICANT CHANGE UP (ref 40–120)
ALT FLD-CCNC: <5 U/L — SIGNIFICANT CHANGE UP
ANION GAP SERPL CALC-SCNC: 16 MMOL/L — SIGNIFICANT CHANGE UP (ref 5–17)
AST SERPL-CCNC: 20 U/L — SIGNIFICANT CHANGE UP
BASOPHILS # BLD AUTO: 0 K/UL — SIGNIFICANT CHANGE UP (ref 0–0.2)
BASOPHILS NFR BLD AUTO: 0 % — SIGNIFICANT CHANGE UP (ref 0–2)
BILIRUB SERPL-MCNC: 0.7 MG/DL — SIGNIFICANT CHANGE UP (ref 0.4–2)
BUN SERPL-MCNC: 9.9 MG/DL — SIGNIFICANT CHANGE UP (ref 8–20)
CALCIUM SERPL-MCNC: 9.2 MG/DL — SIGNIFICANT CHANGE UP (ref 8.6–10.2)
CHLORIDE SERPL-SCNC: 99 MMOL/L — SIGNIFICANT CHANGE UP (ref 98–107)
CO2 SERPL-SCNC: 20 MMOL/L — LOW (ref 22–29)
CREAT SERPL-MCNC: 0.63 MG/DL — SIGNIFICANT CHANGE UP (ref 0.5–1.3)
EGFR: 91 ML/MIN/1.73M2 — SIGNIFICANT CHANGE UP
EOSINOPHIL # BLD AUTO: 0 K/UL — SIGNIFICANT CHANGE UP (ref 0–0.5)
EOSINOPHIL NFR BLD AUTO: 0 % — SIGNIFICANT CHANGE UP (ref 0–6)
GIANT PLATELETS BLD QL SMEAR: PRESENT — SIGNIFICANT CHANGE UP
GLUCOSE SERPL-MCNC: 79 MG/DL — SIGNIFICANT CHANGE UP (ref 70–99)
HCT VFR BLD CALC: 32.7 % — LOW (ref 34.5–45)
HGB BLD-MCNC: 10.6 G/DL — LOW (ref 11.5–15.5)
LYMPHOCYTES # BLD AUTO: 0.31 K/UL — LOW (ref 1–3.3)
LYMPHOCYTES # BLD AUTO: 4.4 % — LOW (ref 13–44)
MAGNESIUM SERPL-MCNC: 1.8 MG/DL — SIGNIFICANT CHANGE UP (ref 1.8–2.6)
MANUAL SMEAR VERIFICATION: SIGNIFICANT CHANGE UP
MCHC RBC-ENTMCNC: 28.9 PG — SIGNIFICANT CHANGE UP (ref 27–34)
MCHC RBC-ENTMCNC: 32.4 GM/DL — SIGNIFICANT CHANGE UP (ref 32–36)
MCV RBC AUTO: 89.1 FL — SIGNIFICANT CHANGE UP (ref 80–100)
MONOCYTES # BLD AUTO: 0.37 K/UL — SIGNIFICANT CHANGE UP (ref 0–0.9)
MONOCYTES NFR BLD AUTO: 5.2 % — SIGNIFICANT CHANGE UP (ref 2–14)
NEUTROPHILS # BLD AUTO: 6.44 K/UL — SIGNIFICANT CHANGE UP (ref 1.8–7.4)
NEUTROPHILS NFR BLD AUTO: 90.4 % — HIGH (ref 43–77)
OVALOCYTES BLD QL SMEAR: SLIGHT — SIGNIFICANT CHANGE UP
PLAT MORPH BLD: NORMAL — SIGNIFICANT CHANGE UP
PLATELET # BLD AUTO: 268 K/UL — SIGNIFICANT CHANGE UP (ref 150–400)
POIKILOCYTOSIS BLD QL AUTO: SLIGHT — SIGNIFICANT CHANGE UP
POLYCHROMASIA BLD QL SMEAR: SLIGHT — SIGNIFICANT CHANGE UP
POTASSIUM SERPL-MCNC: 3.6 MMOL/L — SIGNIFICANT CHANGE UP (ref 3.5–5.3)
POTASSIUM SERPL-SCNC: 3.6 MMOL/L — SIGNIFICANT CHANGE UP (ref 3.5–5.3)
PROT SERPL-MCNC: 6.4 G/DL — LOW (ref 6.6–8.7)
RBC # BLD: 3.67 M/UL — LOW (ref 3.8–5.2)
RBC # FLD: 14.8 % — HIGH (ref 10.3–14.5)
RBC BLD AUTO: ABNORMAL
SARS-COV-2 RNA SPEC QL NAA+PROBE: SIGNIFICANT CHANGE UP
SCHISTOCYTES BLD QL AUTO: SLIGHT — SIGNIFICANT CHANGE UP
SODIUM SERPL-SCNC: 135 MMOL/L — SIGNIFICANT CHANGE UP (ref 135–145)
T3 SERPL-MCNC: 47 NG/DL — LOW (ref 80–200)
T4 AB SER-ACNC: 6.5 UG/DL — SIGNIFICANT CHANGE UP (ref 4.5–12)
WBC # BLD: 7.12 K/UL — SIGNIFICANT CHANGE UP (ref 3.8–10.5)
WBC # FLD AUTO: 7.12 K/UL — SIGNIFICANT CHANGE UP (ref 3.8–10.5)

## 2022-04-27 PROCEDURE — 99232 SBSQ HOSP IP/OBS MODERATE 35: CPT

## 2022-04-27 RX ORDER — CHOLECALCIFEROL (VITAMIN D3) 125 MCG
800 CAPSULE ORAL DAILY
Refills: 0 | Status: DISCONTINUED | OUTPATIENT
Start: 2022-04-27 | End: 2022-04-29

## 2022-04-27 RX ADMIN — Medication 100 MICROGRAM(S): at 05:51

## 2022-04-27 RX ADMIN — Medication 650 MILLIGRAM(S): at 05:51

## 2022-04-27 RX ADMIN — SODIUM CHLORIDE 75 MILLILITER(S): 9 INJECTION INTRAMUSCULAR; INTRAVENOUS; SUBCUTANEOUS at 18:02

## 2022-04-27 RX ADMIN — ENOXAPARIN SODIUM 40 MILLIGRAM(S): 100 INJECTION SUBCUTANEOUS at 05:51

## 2022-04-27 RX ADMIN — Medication 650 MILLIGRAM(S): at 13:43

## 2022-04-27 RX ADMIN — MUPIROCIN 1 APPLICATION(S): 20 OINTMENT TOPICAL at 05:51

## 2022-04-27 RX ADMIN — SENNA PLUS 2 TABLET(S): 8.6 TABLET ORAL at 21:37

## 2022-04-27 RX ADMIN — POLYETHYLENE GLYCOL 3350 17 GRAM(S): 17 POWDER, FOR SOLUTION ORAL at 12:40

## 2022-04-27 RX ADMIN — SODIUM CHLORIDE 75 MILLILITER(S): 9 INJECTION INTRAMUSCULAR; INTRAVENOUS; SUBCUTANEOUS at 00:11

## 2022-04-27 RX ADMIN — MEMANTINE HYDROCHLORIDE 10 MILLIGRAM(S): 10 TABLET ORAL at 05:51

## 2022-04-27 RX ADMIN — Medication 650 MILLIGRAM(S): at 22:00

## 2022-04-27 RX ADMIN — IRON SUCROSE 110 MILLIGRAM(S): 20 INJECTION, SOLUTION INTRAVENOUS at 05:58

## 2022-04-27 RX ADMIN — MUPIROCIN 1 APPLICATION(S): 20 OINTMENT TOPICAL at 18:58

## 2022-04-27 RX ADMIN — LISINOPRIL 10 MILLIGRAM(S): 2.5 TABLET ORAL at 05:51

## 2022-04-27 RX ADMIN — PREGABALIN 1000 MICROGRAM(S): 225 CAPSULE ORAL at 12:39

## 2022-04-27 RX ADMIN — Medication 650 MILLIGRAM(S): at 21:38

## 2022-04-27 RX ADMIN — Medication 650 MILLIGRAM(S): at 06:51

## 2022-04-27 NOTE — PROGRESS NOTE ADULT - ASSESSMENT
77 y/o female with PMH of dementia, hypothyroidism, HTN was brought to the ED for leg pain s/p fall. Found to have mild impacted subcapital femoral neck fracture on CT pelvis. Ortho on board. CT head/cervical: no acute finding. Patient is s.p orif post op    #Subcapital femoral neck fracture s/p fall at home   - orthopedics following  - s.p orif  - dvt prop and pain control     #Leukocytosis / likely reactive / now improved   -WBC: 12.47 with left shift   -resolved  - x 3 abxs as per ortho as per post op protocol     #COVID positive   -afebrile   -Patient asymptomatic , not hypoxic   -isolation precautions   - repeat covid for am       #HTN  -on Benazapril 10mg   -controlled     #Hypothyroidism   -tsh greater than 40   - send thyroid panel for am   - Synthroid 75mcg --> incr to 100mcg    #vit b12 def  - replete    # agitation / ams / likely delirium with underlying dementia    - dcd Aricept 10mg  Memantine ER 28mg (non-formulary) will give 10mg bid  Risperidone 0.5mg HS. as per daughter causes more agitation.   - started on seroquel bid     Supportive   DVT prophylaxis: Lovenox   Code: full code     plan for yary in 1- 2 days

## 2022-04-27 NOTE — PROGRESS NOTE ADULT - SUBJECTIVE AND OBJECTIVE BOX
Vital Signs Last 24 Hrs  T(C): 36.6 (27 Apr 2022 04:09), Max: 37.1 (26 Apr 2022 17:39)  T(F): 97.9 (27 Apr 2022 04:09), Max: 98.7 (26 Apr 2022 17:39)  HR: 87 (27 Apr 2022 04:09) (80 - 94)  BP: 163/85 (27 Apr 2022 04:09) (143/94 - 163/85)  BP(mean): --  RR: 18 (27 Apr 2022 04:09) (18 - 18)  SpO2: 96% (27 Apr 2022 04:09) (92% - 96%)Procedure: Left hip femoral neck system, pod #2  Surgeon: Lydia COTO  DOS: 4/25/22      Pt comfortable without complaints, pain controlled. Denies numbness/tingling        General Exam:    General: Pt Alert and oriented, NAD, controlled pain.    LLE:   Dressings C/D/I. No bleeding. No erythema. new dressing placed    Visible Skin: No erythema. No wound dehiscence    Pulses: 2+ dorsalis pedis pulse. Cap refill < 2 sec.    Sensation: Grossly intact to light touch without deficit.    Motor: +EHL/FHL/AT/GC                          10.6   7.12  )-----------( 268      ( 27 Apr 2022 07:06 )             32.7   04-27    135  |  99  |  9.9  ----------------------------<  79  3.6   |  20.0<L>  |  0.63    Ca    9.2      27 Apr 2022 07:06  Phos  3.3     04-26  Mg     1.8     04-27    TPro  6.4<L>  /  Alb  3.5  /  TBili  0.7  /  DBili  x   /  AST  20  /  ALT  <5  /  AlkPhos  64  04-27        A/P: 76y Female  POD#2  s/p Left hip FNS    - ortho Stable  - Pain Control  - DVT ppx as prescribed  - PT eval  - Weight bearing status: WBAT  - continue rest of care per primary team

## 2022-04-27 NOTE — PROGRESS NOTE ADULT - SUBJECTIVE AND OBJECTIVE BOX
MIYA WILKINS    [id number]    76y      Female    INTERVAL HPI/OVERNIGHT EVENTS: patient being seen for femur fracture, patient is s.p orif , patient was mildly agitated this am , wrist restrains applied.     Vital Signs Last 24 Hrs  T(C): 36.6 (27 Apr 2022 04:09), Max: 37.1 (26 Apr 2022 17:39)  T(F): 97.9 (27 Apr 2022 04:09), Max: 98.7 (26 Apr 2022 17:39)  HR: 87 (27 Apr 2022 04:09) (80 - 87)  BP: 163/85 (27 Apr 2022 04:09) (143/94 - 163/85)  BP(mean): --  RR: 18 (27 Apr 2022 04:09) (18 - 18)  SpO2: 96% (27 Apr 2022 04:09) (94% - 96%)    PHYSICAL EXAM:  General: awake, confused, no distress   Neck: supple, no JVD   Lungs: CTA bilateral   Cardio: RRR, S1/S2, No murmur  Abdomen: Soft, Nontender, Nondistended; Bowel sounds present  Extremities: No calf tenderness, No pitting edema  Neruro aao x1-2                            10.6   7.12  )-----------( 268      ( 27 Apr 2022 07:06 )             32.7     04-27    135  |  99  |  9.9  ----------------------------<  79  3.6   |  20.0<L>  |  0.63    Ca    9.2      27 Apr 2022 07:06  Phos  3.3     04-26  Mg     1.8     04-27    TPro  6.4<L>  /  Alb  3.5  /  TBili  0.7  /  DBili  x   /  AST  20  /  ALT  <5  /  AlkPhos  64  04-27      MEDICATIONS  (STANDING):  acetaminophen     Tablet .. 650 milliGRAM(s) Oral every 8 hours  ceFAZolin   IVPB 2000 milliGRAM(s) IV Intermittent once  cholecalciferol 800 Unit(s) Oral daily  cyanocobalamin 1000 MICROGram(s) Oral daily  enoxaparin Injectable 40 milliGRAM(s) SubCutaneous every 24 hours  iron sucrose IVPB 200 milliGRAM(s) IV Intermittent every 24 hours  levothyroxine 100 MICROGram(s) Oral daily  lisinopril 10 milliGRAM(s) Oral daily  mupirocin 2% Ointment 1 Application(s) Both Nostrils two times a day  polyethylene glycol 3350 17 Gram(s) Oral daily  senna 2 Tablet(s) Oral at bedtime  sodium chloride 0.9%. 1000 milliLiter(s) (75 mL/Hr) IV Continuous <Continuous>  vancomycin  IVPB 750 milliGRAM(s) IV Intermittent once    MEDICATIONS  (PRN):  acetaminophen     Tablet .. 650 milliGRAM(s) Oral every 6 hours PRN Temp greater or equal to 38C (100.4F), Mild Pain (1 - 3)  ALBUTerol    90 MICROgram(s) HFA Inhaler 1 Puff(s) Inhalation three times a day PRN Shortness of Breath and/or Wheezing  aluminum hydroxide/magnesium hydroxide/simethicone Suspension 30 milliLiter(s) Oral every 4 hours PRN Dyspepsia  magnesium hydroxide Suspension 30 milliLiter(s) Oral daily PRN Constipation  melatonin 3 milliGRAM(s) Oral at bedtime PRN Insomnia  melatonin 3 milliGRAM(s) Oral at bedtime PRN Insomnia  ondansetron Injectable 4 milliGRAM(s) IV Push every 8 hours PRN Nausea and/or Vomiting  ondansetron Injectable 4 milliGRAM(s) IV Push every 6 hours PRN Nausea and/or Vomiting  oxyCODONE    IR 2.5 milliGRAM(s) Oral every 4 hours PRN Moderate Pain (4 - 6)  oxyCODONE    IR 5 milliGRAM(s) Oral every 4 hours PRN Severe Pain (7 - 10)

## 2022-04-27 NOTE — CHART NOTE - NSCHARTNOTEFT_GEN_A_CORE
At pt bedside for difficult PIV insertion, noted medial right upper extremity infiltrate. Per RN pt last received sodium chloride infusion. Patient with moderate swelling to RUE, area nontender upon palpation. Area nonfluctuant, no erythema, no streaking. FROM to RUE, +2 pulses, distal sensations intact, brisk capillary refill. Ordered warm compresses to RUE and elevate RUE. RN to monitor and escalate PRN. PMD will be notified in AM.
Notified by day PA patient pulled out PIV x2 today and requires B/L unsecured mittens for medical management. Per RN despite increased safety checks, pt reorientation and education pt removed PIV. Pt seen at bedside, pleasantly confused. Pt with h/o dementia. Pt is POD #1 left hip ORIF. Instructed RN to restart PIV and apply B/L unsecured mittens. RN to continue to monitor and escalate PRN.

## 2022-04-28 LAB
ANION GAP SERPL CALC-SCNC: 13 MMOL/L — SIGNIFICANT CHANGE UP (ref 5–17)
BUN SERPL-MCNC: 8 MG/DL — SIGNIFICANT CHANGE UP (ref 8–20)
CALCIUM SERPL-MCNC: 9 MG/DL — SIGNIFICANT CHANGE UP (ref 8.6–10.2)
CHLORIDE SERPL-SCNC: 103 MMOL/L — SIGNIFICANT CHANGE UP (ref 98–107)
CO2 SERPL-SCNC: 22 MMOL/L — SIGNIFICANT CHANGE UP (ref 22–29)
CREAT SERPL-MCNC: 0.51 MG/DL — SIGNIFICANT CHANGE UP (ref 0.5–1.3)
EGFR: 95 ML/MIN/1.73M2 — SIGNIFICANT CHANGE UP
GLUCOSE SERPL-MCNC: 88 MG/DL — SIGNIFICANT CHANGE UP (ref 70–99)
HCT VFR BLD CALC: 29.9 % — LOW (ref 34.5–45)
HGB BLD-MCNC: 9.9 G/DL — LOW (ref 11.5–15.5)
MCHC RBC-ENTMCNC: 28.9 PG — SIGNIFICANT CHANGE UP (ref 27–34)
MCHC RBC-ENTMCNC: 33.1 GM/DL — SIGNIFICANT CHANGE UP (ref 32–36)
MCV RBC AUTO: 87.4 FL — SIGNIFICANT CHANGE UP (ref 80–100)
PLATELET # BLD AUTO: 291 K/UL — SIGNIFICANT CHANGE UP (ref 150–400)
POTASSIUM SERPL-MCNC: 3.4 MMOL/L — LOW (ref 3.5–5.3)
POTASSIUM SERPL-SCNC: 3.4 MMOL/L — LOW (ref 3.5–5.3)
RBC # BLD: 3.42 M/UL — LOW (ref 3.8–5.2)
RBC # FLD: 14.7 % — HIGH (ref 10.3–14.5)
SARS-COV-2 RNA SPEC QL NAA+PROBE: SIGNIFICANT CHANGE UP
SODIUM SERPL-SCNC: 138 MMOL/L — SIGNIFICANT CHANGE UP (ref 135–145)
WBC # BLD: 7.17 K/UL — SIGNIFICANT CHANGE UP (ref 3.8–10.5)
WBC # FLD AUTO: 7.17 K/UL — SIGNIFICANT CHANGE UP (ref 3.8–10.5)

## 2022-04-28 PROCEDURE — 99232 SBSQ HOSP IP/OBS MODERATE 35: CPT

## 2022-04-28 RX ORDER — POTASSIUM CHLORIDE 20 MEQ
20 PACKET (EA) ORAL ONCE
Refills: 0 | Status: COMPLETED | OUTPATIENT
Start: 2022-04-28 | End: 2022-04-28

## 2022-04-28 RX ORDER — QUETIAPINE FUMARATE 200 MG/1
25 TABLET, FILM COATED ORAL
Refills: 0 | Status: DISCONTINUED | OUTPATIENT
Start: 2022-04-28 | End: 2022-04-29

## 2022-04-28 RX ORDER — POTASSIUM CHLORIDE 20 MEQ
40 PACKET (EA) ORAL ONCE
Refills: 0 | Status: COMPLETED | OUTPATIENT
Start: 2022-04-28 | End: 2022-04-28

## 2022-04-28 RX ORDER — POTASSIUM CHLORIDE 20 MEQ
20 PACKET (EA) ORAL
Refills: 0 | Status: DISCONTINUED | OUTPATIENT
Start: 2022-04-28 | End: 2022-04-28

## 2022-04-28 RX ADMIN — QUETIAPINE FUMARATE 25 MILLIGRAM(S): 200 TABLET, FILM COATED ORAL at 17:57

## 2022-04-28 RX ADMIN — MUPIROCIN 1 APPLICATION(S): 20 OINTMENT TOPICAL at 17:58

## 2022-04-28 RX ADMIN — Medication 100 MICROGRAM(S): at 05:37

## 2022-04-28 RX ADMIN — MUPIROCIN 1 APPLICATION(S): 20 OINTMENT TOPICAL at 05:37

## 2022-04-28 RX ADMIN — Medication 20 MILLIEQUIVALENT(S): at 17:57

## 2022-04-28 RX ADMIN — Medication 650 MILLIGRAM(S): at 22:51

## 2022-04-28 RX ADMIN — Medication 650 MILLIGRAM(S): at 05:36

## 2022-04-28 RX ADMIN — Medication 800 UNIT(S): at 15:02

## 2022-04-28 RX ADMIN — PREGABALIN 1000 MICROGRAM(S): 225 CAPSULE ORAL at 15:03

## 2022-04-28 RX ADMIN — ENOXAPARIN SODIUM 40 MILLIGRAM(S): 100 INJECTION SUBCUTANEOUS at 05:37

## 2022-04-28 RX ADMIN — Medication 650 MILLIGRAM(S): at 16:00

## 2022-04-28 RX ADMIN — IRON SUCROSE 110 MILLIGRAM(S): 20 INJECTION, SOLUTION INTRAVENOUS at 05:44

## 2022-04-28 RX ADMIN — Medication 650 MILLIGRAM(S): at 15:03

## 2022-04-28 RX ADMIN — Medication 650 MILLIGRAM(S): at 21:55

## 2022-04-28 RX ADMIN — Medication 40 MILLIEQUIVALENT(S): at 15:02

## 2022-04-28 RX ADMIN — LISINOPRIL 10 MILLIGRAM(S): 2.5 TABLET ORAL at 05:37

## 2022-04-28 RX ADMIN — SENNA PLUS 2 TABLET(S): 8.6 TABLET ORAL at 21:56

## 2022-04-28 NOTE — PROGRESS NOTE ADULT - SUBJECTIVE AND OBJECTIVE BOX
cc: fem neck fx     INTERVAL HPI/OVERNIGHT EVENTS: patient seen and eval. patient  is comfortable. in no acute distress. no fever . denies any sob. wrist restrains dcd today.     Vital Signs Last 24 Hrs  T(C): 36.9 (04-28-22 @ 10:49), Max: 36.9 (04-28-22 @ 10:49)  T(F): 98.4 (04-28-22 @ 10:49), Max: 98.4 (04-28-22 @ 10:49)  HR: 96 (04-28-22 @ 10:49) (83 - 104)  BP: 118/65 (04-28-22 @ 10:49) (118/65 - 166/88)  BP(mean): --  RR: 19 (04-28-22 @ 10:49) (18 - 19)  SpO2: 95% (04-28-22 @ 10:49) (93% - 95%)      PHYSICAL EXAM:  General: awake, confused, no distress   Neck: supple, no JVD   Lungs: CTA bilateral   Cardio: RRR, S1/S2, No murmur  Abdomen: Soft, Nontender, Nondistended; Bowel sounds present  Extremities: No calf tenderness, No pitting edema  Neruro aao x2 -3 pleasant                             9.9    7.17  )-----------( 291      ( 28 Apr 2022 07:20 )             29.9   04-28    138  |  103  |  8.0  ----------------------------<  88  3.4<L>   |  22.0  |  0.51    Ca    9.0      28 Apr 2022 07:20  Mg     1.8     04-27    TPro  6.4<L>  /  Alb  3.5  /  TBili  0.7  /  DBili  x   /  AST  20  /  ALT  <5  /  AlkPhos  64  04-27      MEDICATIONS  (STANDING):  acetaminophen     Tablet .. 650 milliGRAM(s) Oral every 8 hours  cholecalciferol 800 Unit(s) Oral daily  cyanocobalamin 1000 MICROGram(s) Oral daily  enoxaparin Injectable 40 milliGRAM(s) SubCutaneous every 24 hours  levothyroxine 100 MICROGram(s) Oral daily  lisinopril 10 milliGRAM(s) Oral daily  mupirocin 2% Ointment 1 Application(s) Both Nostrils two times a day  polyethylene glycol 3350 17 Gram(s) Oral daily  potassium chloride    Tablet ER 20 milliEquivalent(s) Oral every 2 hours  potassium chloride   Powder 40 milliEquivalent(s) Oral once  QUEtiapine 25 milliGRAM(s) Oral two times a day  senna 2 Tablet(s) Oral at bedtime    MEDICATIONS  (PRN):  acetaminophen     Tablet .. 650 milliGRAM(s) Oral every 6 hours PRN Temp greater or equal to 38C (100.4F), Mild Pain (1 - 3)  ALBUTerol    90 MICROgram(s) HFA Inhaler 1 Puff(s) Inhalation three times a day PRN Shortness of Breath and/or Wheezing  aluminum hydroxide/magnesium hydroxide/simethicone Suspension 30 milliLiter(s) Oral every 4 hours PRN Dyspepsia  magnesium hydroxide Suspension 30 milliLiter(s) Oral daily PRN Constipation  melatonin 3 milliGRAM(s) Oral at bedtime PRN Insomnia  melatonin 3 milliGRAM(s) Oral at bedtime PRN Insomnia  ondansetron Injectable 4 milliGRAM(s) IV Push every 6 hours PRN Nausea and/or Vomiting  ondansetron Injectable 4 milliGRAM(s) IV Push every 8 hours PRN Nausea and/or Vomiting  oxyCODONE    IR 2.5 milliGRAM(s) Oral every 4 hours PRN Moderate Pain (4 - 6)  oxyCODONE    IR 5 milliGRAM(s) Oral every 4 hours PRN Severe Pain (7 - 10)

## 2022-04-28 NOTE — PROGRESS NOTE ADULT - PROVIDER SPECIALTY LIST ADULT
Orthopedics
Orthopedics
Hospitalist
Orthopedics
Orthopedics
Hospitalist
Hospitalist
Internal Medicine

## 2022-04-28 NOTE — PROGRESS NOTE ADULT - SUBJECTIVE AND OBJECTIVE BOX
MIYA WILKINS    [id number]    History: 76y Female is status post L hip FNS POD # 3. Patient is doing well and is comfortable. The patient's pain is controlled using the prescribed pain medications. The patient is participating in physical therapy. Denies nausea, vomiting, chest pain, shortness of breath, abdominal pain or fever. No new complaints.                            9.9    7.17  )-----------( 291      ( 28 Apr 2022 07:20 )             29.9     04-28    138  |  103  |  8.0  ----------------------------<  88  3.4<L>   |  22.0  |  0.51    Ca    9.0      28 Apr 2022 07:20  Mg     1.8     04-27    TPro  6.4<L>  /  Alb  3.5  /  TBili  0.7  /  DBili  x   /  AST  20  /  ALT  <5  /  AlkPhos  64  04-27      MEDICATIONS  (STANDING):  acetaminophen     Tablet .. 650 milliGRAM(s) Oral every 8 hours  ceFAZolin   IVPB 2000 milliGRAM(s) IV Intermittent once  cholecalciferol 800 Unit(s) Oral daily  cyanocobalamin 1000 MICROGram(s) Oral daily  enoxaparin Injectable 40 milliGRAM(s) SubCutaneous every 24 hours  levothyroxine 100 MICROGram(s) Oral daily  lisinopril 10 milliGRAM(s) Oral daily  mupirocin 2% Ointment 1 Application(s) Both Nostrils two times a day  polyethylene glycol 3350 17 Gram(s) Oral daily  senna 2 Tablet(s) Oral at bedtime  sodium chloride 0.9%. 1000 milliLiter(s) (75 mL/Hr) IV Continuous <Continuous>  vancomycin  IVPB 750 milliGRAM(s) IV Intermittent once    MEDICATIONS  (PRN):  acetaminophen     Tablet .. 650 milliGRAM(s) Oral every 6 hours PRN Temp greater or equal to 38C (100.4F), Mild Pain (1 - 3)  ALBUTerol    90 MICROgram(s) HFA Inhaler 1 Puff(s) Inhalation three times a day PRN Shortness of Breath and/or Wheezing  aluminum hydroxide/magnesium hydroxide/simethicone Suspension 30 milliLiter(s) Oral every 4 hours PRN Dyspepsia  magnesium hydroxide Suspension 30 milliLiter(s) Oral daily PRN Constipation  melatonin 3 milliGRAM(s) Oral at bedtime PRN Insomnia  melatonin 3 milliGRAM(s) Oral at bedtime PRN Insomnia  ondansetron Injectable 4 milliGRAM(s) IV Push every 6 hours PRN Nausea and/or Vomiting  ondansetron Injectable 4 milliGRAM(s) IV Push every 8 hours PRN Nausea and/or Vomiting  oxyCODONE    IR 2.5 milliGRAM(s) Oral every 4 hours PRN Moderate Pain (4 - 6)  oxyCODONE    IR 5 milliGRAM(s) Oral every 4 hours PRN Severe Pain (7 - 10)      Physical exam: The Prineo dressing is clean, dry and intact. No drainage or discharge. No erythema is noted. No blistering. No ecchymosis. The calf is supple nontender. Sensation to light touch is grossly intact distally. Extensor hallucis longus and flexor hallucis longus are intact. 2+ dorsalis pedis pulse. Capillary refill is less than 2 seconds.    Primary Orthopedic Assessment:  • 76y Female is status post L hip FNS POD # 3    Secondary  Orthopedic Assessment(s):   •     Secondary  Medical Assessment(s):   •     Plan:   • Pain control as clinically indicated  • DVT prophylaxis as prescribed, including use of compression devices and ankle pumps  • Continue physical therapy  • Weightbearing as tolerated of the left lower extremity with assistance of a walker  • Incentive spirometry encouraged  • Discharge planning – anticipated discharge is for subacute rehabilitation as per primary team    MIYA WILKINS    [id number]    History: 76y Female is status post L hip FNS POD # 3. Patient is doing well and is comfortable. The patient's pain is controlled using the prescribed pain medications. The patient is participating in physical therapy. Denies nausea, vomiting, chest pain, shortness of breath, abdominal pain or fever. No new complaints.                            9.9    7.17  )-----------( 291      ( 28 Apr 2022 07:20 )             29.9     04-28    138  |  103  |  8.0  ----------------------------<  88  3.4<L>   |  22.0  |  0.51    Ca    9.0      28 Apr 2022 07:20  Mg     1.8     04-27    TPro  6.4<L>  /  Alb  3.5  /  TBili  0.7  /  DBili  x   /  AST  20  /  ALT  <5  /  AlkPhos  64  04-27      MEDICATIONS  (STANDING):  acetaminophen     Tablet .. 650 milliGRAM(s) Oral every 8 hours  ceFAZolin   IVPB 2000 milliGRAM(s) IV Intermittent once  cholecalciferol 800 Unit(s) Oral daily  cyanocobalamin 1000 MICROGram(s) Oral daily  enoxaparin Injectable 40 milliGRAM(s) SubCutaneous every 24 hours  levothyroxine 100 MICROGram(s) Oral daily  lisinopril 10 milliGRAM(s) Oral daily  mupirocin 2% Ointment 1 Application(s) Both Nostrils two times a day  polyethylene glycol 3350 17 Gram(s) Oral daily  senna 2 Tablet(s) Oral at bedtime  sodium chloride 0.9%. 1000 milliLiter(s) (75 mL/Hr) IV Continuous <Continuous>  vancomycin  IVPB 750 milliGRAM(s) IV Intermittent once    MEDICATIONS  (PRN):  acetaminophen     Tablet .. 650 milliGRAM(s) Oral every 6 hours PRN Temp greater or equal to 38C (100.4F), Mild Pain (1 - 3)  ALBUTerol    90 MICROgram(s) HFA Inhaler 1 Puff(s) Inhalation three times a day PRN Shortness of Breath and/or Wheezing  aluminum hydroxide/magnesium hydroxide/simethicone Suspension 30 milliLiter(s) Oral every 4 hours PRN Dyspepsia  magnesium hydroxide Suspension 30 milliLiter(s) Oral daily PRN Constipation  melatonin 3 milliGRAM(s) Oral at bedtime PRN Insomnia  melatonin 3 milliGRAM(s) Oral at bedtime PRN Insomnia  ondansetron Injectable 4 milliGRAM(s) IV Push every 6 hours PRN Nausea and/or Vomiting  ondansetron Injectable 4 milliGRAM(s) IV Push every 8 hours PRN Nausea and/or Vomiting  oxyCODONE    IR 2.5 milliGRAM(s) Oral every 4 hours PRN Moderate Pain (4 - 6)  oxyCODONE    IR 5 milliGRAM(s) Oral every 4 hours PRN Severe Pain (7 - 10)      Physical exam: The Prineo dressing is clean, dry and intact. No drainage or discharge. No erythema is noted. No blistering. No ecchymosis. The calf is supple nontender. Sensation to light touch is grossly intact distally. Extensor hallucis longus and flexor hallucis longus are intact. 2+ dorsalis pedis pulse. Capillary refill is less than 2 seconds.    Primary Orthopedic Assessment:  • 76y Female is status post L hip FNS POD # 3    Secondary  Orthopedic Assessment(s):   •     Secondary  Medical Assessment(s):   •     Plan:   • Pain control as clinically indicated  • DVT prophylaxis as prescribed, including use of compression devices and ankle pumps  • Continue physical therapy  • Weightbearing as tolerated of the left lower extremity with assistance of a walker  • Incentive spirometry encouraged  • Discharge planning – anticipated discharge is for subacute rehabilitation as per primary team  • Ortho stable, will sign off

## 2022-04-28 NOTE — PROGRESS NOTE ADULT - ASSESSMENT
75 y/o female with PMH of dementia, hypothyroidism, HTN was brought to the ED for leg pain s/p fall. Found to have mild impacted subcapital femoral neck fracture on CT pelvis. Ortho on board. CT head/cervical: no acute finding. Patient is s.p orif post op    #Subcapital left  femoral neck fracture s/p fall at home   - orthopedics following  - s.p orif 4/25   - dvt prop and pain control     #Leukocytosis / likely reactive / now  resolved   -resolved  - s/p  periop iv abxs as per ortho as per post op protocol . now dcd    #COVID positive   -afebrile , Patient asymptomatic , not hypoxic   -isolation precautions as per infection control   - repeat covid today   - discussed with inefction control , patient needs to have two tests negative to come off isolation     #HTN  -on lisinopril   -controlled     #Hypothyroidism   -tsh greater than 40   - Synthroid 75mcg --> incr to 100mcg    #vit b12 def  - replete    # agitation / ams / likely delirium with underlying dementia  / now improved   - c/w seroquel bid     #Supportive   DVT prophylaxis: Lovenox   Code: full code     plan of care discussed with patient and son edith [phone] and updated of above.    75 y/o female with PMH of dementia, hypothyroidism, HTN was brought to the ED for leg pain s/p fall. Found to have mild impacted subcapital femoral neck fracture on CT pelvis. Ortho on board. CT head/cervical: no acute finding. Patient is s.p orif post op    #Subcapital left  femoral neck fracture s/p fall at home   - orthopedics following  - s.p orif 4/25   - dvt prop and pain control     #Leukocytosis / likely reactive / now  resolved   -resolved  - s/p  periop iv abxs as per ortho as per post op protocol . now dcd    #COVID positive   -afebrile , Patient asymptomatic , not hypoxic   -isolation precautions as per infection control   - repeat covid today   - discussed with inefction control , patient needs to have two tests negative to come off isolation     #HTN  -on lisinopril   -controlled     #Hypothyroidism   -tsh greater than 40   - Synthroid 75mcg --> incr to 100mcg    #vit b12 def  - replete    # agitation / ams / likely delirium with underlying dementia  / now improved   - c/w seroquel bid     #hypokalemia   - replete , monitor     #Supportive   DVT prophylaxis: Lovenox   Code: full code     plan of care discussed with patient and son edith [phone] and updated of above.

## 2022-04-29 ENCOUNTER — TRANSCRIPTION ENCOUNTER (OUTPATIENT)
Age: 77
End: 2022-04-29

## 2022-04-29 VITALS
OXYGEN SATURATION: 96 % | TEMPERATURE: 98 F | HEART RATE: 68 BPM | RESPIRATION RATE: 17 BRPM | DIASTOLIC BLOOD PRESSURE: 68 MMHG | SYSTOLIC BLOOD PRESSURE: 138 MMHG

## 2022-04-29 LAB
ANION GAP SERPL CALC-SCNC: 11 MMOL/L — SIGNIFICANT CHANGE UP (ref 5–17)
BUN SERPL-MCNC: 11.4 MG/DL — SIGNIFICANT CHANGE UP (ref 8–20)
CALCIUM SERPL-MCNC: 9.3 MG/DL — SIGNIFICANT CHANGE UP (ref 8.6–10.2)
CHLORIDE SERPL-SCNC: 105 MMOL/L — SIGNIFICANT CHANGE UP (ref 98–107)
CO2 SERPL-SCNC: 21 MMOL/L — LOW (ref 22–29)
CREAT SERPL-MCNC: 0.57 MG/DL — SIGNIFICANT CHANGE UP (ref 0.5–1.3)
EGFR: 93 ML/MIN/1.73M2 — SIGNIFICANT CHANGE UP
GLUCOSE SERPL-MCNC: 69 MG/DL — LOW (ref 70–99)
HCT VFR BLD CALC: 32.5 % — LOW (ref 34.5–45)
HGB BLD-MCNC: 10.6 G/DL — LOW (ref 11.5–15.5)
MCHC RBC-ENTMCNC: 29.4 PG — SIGNIFICANT CHANGE UP (ref 27–34)
MCHC RBC-ENTMCNC: 32.6 GM/DL — SIGNIFICANT CHANGE UP (ref 32–36)
MCV RBC AUTO: 90 FL — SIGNIFICANT CHANGE UP (ref 80–100)
PLATELET # BLD AUTO: 240 K/UL — SIGNIFICANT CHANGE UP (ref 150–400)
POTASSIUM SERPL-MCNC: 4 MMOL/L — SIGNIFICANT CHANGE UP (ref 3.5–5.3)
POTASSIUM SERPL-SCNC: 4 MMOL/L — SIGNIFICANT CHANGE UP (ref 3.5–5.3)
RBC # BLD: 3.61 M/UL — LOW (ref 3.8–5.2)
RBC # FLD: 15.1 % — HIGH (ref 10.3–14.5)
SODIUM SERPL-SCNC: 137 MMOL/L — SIGNIFICANT CHANGE UP (ref 135–145)
WBC # BLD: 4.85 K/UL — SIGNIFICANT CHANGE UP (ref 3.8–10.5)
WBC # FLD AUTO: 4.85 K/UL — SIGNIFICANT CHANGE UP (ref 3.8–10.5)

## 2022-04-29 PROCEDURE — 85025 COMPLETE CBC W/AUTO DIFF WBC: CPT

## 2022-04-29 PROCEDURE — 72125 CT NECK SPINE W/O DYE: CPT | Mod: MG

## 2022-04-29 PROCEDURE — 85027 COMPLETE CBC AUTOMATED: CPT

## 2022-04-29 PROCEDURE — 86900 BLOOD TYPING SEROLOGIC ABO: CPT

## 2022-04-29 PROCEDURE — 96374 THER/PROPH/DIAG INJ IV PUSH: CPT

## 2022-04-29 PROCEDURE — 97167 OT EVAL HIGH COMPLEX 60 MIN: CPT

## 2022-04-29 PROCEDURE — 82607 VITAMIN B-12: CPT

## 2022-04-29 PROCEDURE — 36415 COLL VENOUS BLD VENIPUNCTURE: CPT

## 2022-04-29 PROCEDURE — 86850 RBC ANTIBODY SCREEN: CPT

## 2022-04-29 PROCEDURE — C1713: CPT

## 2022-04-29 PROCEDURE — 82803 BLOOD GASES ANY COMBINATION: CPT

## 2022-04-29 PROCEDURE — C1769: CPT

## 2022-04-29 PROCEDURE — 84100 ASSAY OF PHOSPHORUS: CPT

## 2022-04-29 PROCEDURE — 76000 FLUOROSCOPY <1 HR PHYS/QHP: CPT

## 2022-04-29 PROCEDURE — 80048 BASIC METABOLIC PNL TOTAL CA: CPT

## 2022-04-29 PROCEDURE — 84480 ASSAY TRIIODOTHYRONINE (T3): CPT

## 2022-04-29 PROCEDURE — U0003: CPT

## 2022-04-29 PROCEDURE — 84443 ASSAY THYROID STIM HORMONE: CPT

## 2022-04-29 PROCEDURE — 84436 ASSAY OF TOTAL THYROXINE: CPT

## 2022-04-29 PROCEDURE — 84466 ASSAY OF TRANSFERRIN: CPT

## 2022-04-29 PROCEDURE — 86901 BLOOD TYPING SEROLOGIC RH(D): CPT

## 2022-04-29 PROCEDURE — 82746 ASSAY OF FOLIC ACID SERUM: CPT

## 2022-04-29 PROCEDURE — 85730 THROMBOPLASTIN TIME PARTIAL: CPT

## 2022-04-29 PROCEDURE — 84484 ASSAY OF TROPONIN QUANT: CPT

## 2022-04-29 PROCEDURE — 85610 PROTHROMBIN TIME: CPT

## 2022-04-29 PROCEDURE — 83735 ASSAY OF MAGNESIUM: CPT

## 2022-04-29 PROCEDURE — 72192 CT PELVIS W/O DYE: CPT | Mod: MG

## 2022-04-29 PROCEDURE — 99239 HOSP IP/OBS DSCHRG MGMT >30: CPT

## 2022-04-29 PROCEDURE — 83540 ASSAY OF IRON: CPT

## 2022-04-29 PROCEDURE — 70450 CT HEAD/BRAIN W/O DYE: CPT | Mod: MG

## 2022-04-29 PROCEDURE — G1004: CPT

## 2022-04-29 PROCEDURE — 97116 GAIT TRAINING THERAPY: CPT

## 2022-04-29 PROCEDURE — 93005 ELECTROCARDIOGRAM TRACING: CPT

## 2022-04-29 PROCEDURE — 71045 X-RAY EXAM CHEST 1 VIEW: CPT

## 2022-04-29 PROCEDURE — 82306 VITAMIN D 25 HYDROXY: CPT

## 2022-04-29 PROCEDURE — 97530 THERAPEUTIC ACTIVITIES: CPT

## 2022-04-29 PROCEDURE — U0005: CPT

## 2022-04-29 PROCEDURE — 87637 SARSCOV2&INF A&B&RSV AMP PRB: CPT

## 2022-04-29 PROCEDURE — 83550 IRON BINDING TEST: CPT

## 2022-04-29 PROCEDURE — 72190 X-RAY EXAM OF PELVIS: CPT

## 2022-04-29 PROCEDURE — 99285 EMERGENCY DEPT VISIT HI MDM: CPT | Mod: 25

## 2022-04-29 PROCEDURE — 80053 COMPREHEN METABOLIC PANEL: CPT

## 2022-04-29 PROCEDURE — 94640 AIRWAY INHALATION TREATMENT: CPT

## 2022-04-29 RX ORDER — RISPERIDONE 4 MG/1
1 TABLET ORAL
Qty: 0 | Refills: 0 | DISCHARGE

## 2022-04-29 RX ORDER — ENOXAPARIN SODIUM 100 MG/ML
40 INJECTION SUBCUTANEOUS
Qty: 0 | Refills: 0 | DISCHARGE
Start: 2022-04-29

## 2022-04-29 RX ORDER — POLYETHYLENE GLYCOL 3350 17 G/17G
17 POWDER, FOR SOLUTION ORAL
Qty: 0 | Refills: 0 | DISCHARGE
Start: 2022-04-29

## 2022-04-29 RX ORDER — LEVOTHYROXINE SODIUM 125 MCG
1 TABLET ORAL
Qty: 0 | Refills: 0 | DISCHARGE

## 2022-04-29 RX ORDER — DONEPEZIL HYDROCHLORIDE 10 MG/1
1 TABLET, FILM COATED ORAL
Qty: 0 | Refills: 0 | DISCHARGE

## 2022-04-29 RX ORDER — ALBUTEROL 90 UG/1
1 AEROSOL, METERED ORAL
Qty: 0 | Refills: 0 | DISCHARGE
Start: 2022-04-29

## 2022-04-29 RX ORDER — LEVOTHYROXINE SODIUM 125 MCG
1 TABLET ORAL
Qty: 0 | Refills: 0 | DISCHARGE
Start: 2022-04-29

## 2022-04-29 RX ORDER — PREGABALIN 225 MG/1
1 CAPSULE ORAL
Qty: 0 | Refills: 0 | DISCHARGE
Start: 2022-04-29

## 2022-04-29 RX ORDER — OXYCODONE HYDROCHLORIDE 5 MG/1
1 TABLET ORAL
Qty: 0 | Refills: 0 | DISCHARGE
Start: 2022-04-29

## 2022-04-29 RX ORDER — QUETIAPINE FUMARATE 200 MG/1
1 TABLET, FILM COATED ORAL
Qty: 0 | Refills: 0 | DISCHARGE
Start: 2022-04-29

## 2022-04-29 RX ORDER — MEMANTINE HYDROCHLORIDE 10 MG/1
1 TABLET ORAL
Qty: 0 | Refills: 0 | DISCHARGE

## 2022-04-29 RX ORDER — SENNA PLUS 8.6 MG/1
2 TABLET ORAL
Qty: 0 | Refills: 0 | DISCHARGE
Start: 2022-04-29

## 2022-04-29 RX ORDER — LANOLIN ALCOHOL/MO/W.PET/CERES
1 CREAM (GRAM) TOPICAL
Qty: 0 | Refills: 0 | DISCHARGE
Start: 2022-04-29

## 2022-04-29 RX ORDER — CHOLECALCIFEROL (VITAMIN D3) 125 MCG
800 CAPSULE ORAL
Qty: 0 | Refills: 0 | DISCHARGE
Start: 2022-04-29

## 2022-04-29 RX ORDER — ACETAMINOPHEN 500 MG
2 TABLET ORAL
Qty: 0 | Refills: 0 | DISCHARGE
Start: 2022-04-29

## 2022-04-29 RX ADMIN — ENOXAPARIN SODIUM 40 MILLIGRAM(S): 100 INJECTION SUBCUTANEOUS at 05:22

## 2022-04-29 RX ADMIN — Medication 650 MILLIGRAM(S): at 05:16

## 2022-04-29 RX ADMIN — MUPIROCIN 1 APPLICATION(S): 20 OINTMENT TOPICAL at 05:17

## 2022-04-29 RX ADMIN — Medication 100 MICROGRAM(S): at 05:15

## 2022-04-29 RX ADMIN — QUETIAPINE FUMARATE 25 MILLIGRAM(S): 200 TABLET, FILM COATED ORAL at 05:16

## 2022-04-29 RX ADMIN — LISINOPRIL 10 MILLIGRAM(S): 2.5 TABLET ORAL at 05:20

## 2022-04-29 RX ADMIN — Medication 650 MILLIGRAM(S): at 06:20

## 2022-04-29 NOTE — DISCHARGE NOTE NURSING/CASE MANAGEMENT/SOCIAL WORK - PATIENT PORTAL LINK FT
You can access the FollowMyHealth Patient Portal offered by [facility] by registering at the following website: [email or web address]. By joining [email or web address]’s FollowMyHealth portal, you will also be able to view your health information using other applications (apps) compatible with our system.

## 2022-04-29 NOTE — DISCHARGE NOTE NURSING/CASE MANAGEMENT/SOCIAL WORK - NSDCPEFALRISK_GEN_ALL_CORE
For information on Fall & Injury Prevention, visit: [email or web address] OR  [email or web address] OR  https://www.cdc.gov/steadi/patient.html

## 2022-05-04 NOTE — CDI QUERY NOTE - NSCDIOTHERTXTBX_GEN_ALL_CORE_HH
Documentation noted Covid 19 positive with monitoring and isolation.     Please clarify the status of Covid 19  A) Covid 19 positive on admission and significant, as evidenced by monitoring and isolation  B) Covid 19 ruled out and not clinically significant in this admission  C) Other, please specify  D) Not clinically significant     Supporting Documentation:  Progress Note Adult-Hospitalist Attending [Charted Location: [place] [address] 02] [Authored: 28-Apr-2022 13:40  #COVID positive   -afebrile , Patient asymptomatic , not hypoxic   -isolation precautions as per infection control   - repeat covid today   - discussed with inefction control , patient needs to have two tests negative to come off isolation     Discharge Note Provider [Charted Location: [place] [address] 02] [Authored: 25-Apr-2022 18:57  patient also tested COVID positive . remained afebrile , Patient asymptomatic , not hypoxic ,  repeat covid 4/29 neg.  discussed with inefction prevention, patient  has two tests negative results and can come off isolation

## 2023-03-21 ENCOUNTER — EMERGENCY (EMERGENCY)
Facility: HOSPITAL | Age: 78
LOS: 1 days | Discharge: HOME | End: 2023-03-21
Attending: EMERGENCY MEDICINE
Payer: MEDICARE

## 2023-03-21 VITALS
SYSTOLIC BLOOD PRESSURE: 172 MMHG | TEMPERATURE: 98 F | RESPIRATION RATE: 20 BRPM | HEIGHT: 63 IN | WEIGHT: 119.93 LBS | OXYGEN SATURATION: 98 % | HEART RATE: 86 BPM | DIASTOLIC BLOOD PRESSURE: 98 MMHG

## 2023-03-21 VITALS
SYSTOLIC BLOOD PRESSURE: 146 MMHG | TEMPERATURE: 97 F | HEART RATE: 87 BPM | RESPIRATION RATE: 20 BRPM | DIASTOLIC BLOOD PRESSURE: 83 MMHG | OXYGEN SATURATION: 96 %

## 2023-03-21 DIAGNOSIS — Z98.890 OTHER SPECIFIED POSTPROCEDURAL STATES: Chronic | ICD-10-CM

## 2023-03-21 PROBLEM — E03.9 HYPOTHYROIDISM, UNSPECIFIED: Chronic | Status: ACTIVE | Noted: 2022-04-24

## 2023-03-21 PROBLEM — I10 ESSENTIAL (PRIMARY) HYPERTENSION: Chronic | Status: ACTIVE | Noted: 2022-04-24

## 2023-03-21 PROBLEM — Z87.09 PERSONAL HISTORY OF OTHER DISEASES OF THE RESPIRATORY SYSTEM: Chronic | Status: ACTIVE | Noted: 2022-04-24

## 2023-03-21 PROBLEM — F03.90 UNSPECIFIED DEMENTIA, UNSPECIFIED SEVERITY, WITHOUT BEHAVIORAL DISTURBANCE, PSYCHOTIC DISTURBANCE, MOOD DISTURBANCE, AND ANXIETY: Chronic | Status: ACTIVE | Noted: 2022-04-24

## 2023-03-21 PROCEDURE — 99283 EMERGENCY DEPT VISIT LOW MDM: CPT | Mod: 25

## 2023-03-21 PROCEDURE — 99284 EMERGENCY DEPT VISIT MOD MDM: CPT

## 2023-03-21 PROCEDURE — 94640 AIRWAY INHALATION TREATMENT: CPT

## 2023-03-21 RX ORDER — ALBUTEROL 90 UG/1
2.5 AEROSOL, METERED ORAL ONCE
Refills: 0 | Status: COMPLETED | OUTPATIENT
Start: 2023-03-21 | End: 2023-03-21

## 2023-03-21 RX ADMIN — ALBUTEROL 2.5 MILLIGRAM(S): 90 AEROSOL, METERED ORAL at 15:34

## 2023-03-21 NOTE — ED PROVIDER NOTE - NSICDXPASTSURGICALHX_GEN_ALL_CORE_FT
PAST SURGICAL HISTORY:  S/P ORIF (open reduction internal fixation) fracture L hip, s/p fall April 2022

## 2023-03-21 NOTE — ED ADULT TRIAGE NOTE - CHIEF COMPLAINT QUOTE
BIBA from home - son called ambulance for increased confusion and agitation . Pt seems slightly anxious at present with increase WOB noted
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
## 2023-03-21 NOTE — ED ADULT NURSE NOTE - GLASGOW COMA SCALE: BEST VERBAL RESPONSE
Rey Lindsey looks great here in the office - he is such a cutiepie and is growing so well! Have a wonderful holiday - see you back when he is 7 months old! I have provided you with a Bright Futures age appropriate handout, sponsored through the [facility]  We have discussed the importance of reading/singing daily to your child, childproofing, safety measures such as pool/sunscreen/helmet/choking hazards  Please review this handout when you get the chance! (V4) confused
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
## 2023-03-21 NOTE — ED PROVIDER NOTE - PATIENT PORTAL LINK FT
You can access the FollowMyHealth Patient Portal offered by [facility] by registering at the following website: [email or web address]. By joining [employer]’s FollowMyHealth portal, you will also be able to view your health information using other applications (apps) compatible with our system.
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
## 2023-03-21 NOTE — ED ADULT NURSE NOTE - OBJECTIVE STATEMENT
Pt BIBA Son called ambulance reports increasing confusion. Denies any pain, no fever noted. Pt AOx2 cooperative and calm

## 2023-04-10 PROBLEM — Z00.00 ENCOUNTER FOR PREVENTIVE HEALTH EXAMINATION: Status: ACTIVE | Noted: 2023-04-10

## 2023-04-11 ENCOUNTER — APPOINTMENT (OUTPATIENT)
Dept: NEUROLOGY | Facility: CLINIC | Age: 78
End: 2023-04-11

## 2023-04-26 NOTE — CONSULT NOTE ADULT - NS PANP COMMENT GEN_ALL_CORE FT
Arterial Line Insertion  Performed by: Corrina Guerrero MD  Authorized by: Corrina Guerrero MD   Consent: Verbal consent obtained  Written consent obtained  Risks and benefits: risks, benefits and alternatives were discussed  Consent given by: patient  Patient understanding: patient states understanding of the procedure being performed  Patient consent: the patient's understanding of the procedure matches consent given  Procedure consent: procedure consent matches procedure scheduled  Relevant documents: relevant documents present and verified  Patient identity confirmed: arm band and hospital-assigned identification number  Preparation: Patient was prepped and draped in the usual sterile fashion  Indications: hemodynamic monitoring  Orientation:  Left  Location: radial artery  Sedation:  Patient sedated: GAETT      Procedure Details:  Needle gauge: 20  Seldinger technique: Seldinger technique used  Number of attempts: 1    Post-procedure:  Post-procedure: dressing applied  Waveform: good waveform and waveform confirmed  Patient tolerance: patient tolerated the procedure well with no immediate complications
Orthopaedic Trauma Surgeon Addendum:    I have personally performed a face-to-face diagnostic evaluation on this patient.  I have reviewed the physician assistant note and agree with the history, exam, and plan of care, except as noted.    Orthopedic Surgery is ready to proceed with surgery pending medical optimization and adequate Operating Room availability. Risks of surgical delay discussed with other providers and staff. Please call with any questions or concerns.     Leonid Freeman MD  Orthopaedic Trauma Surgeon  [facility] [place]

## 2023-06-27 ENCOUNTER — INPATIENT (INPATIENT)
Facility: HOSPITAL | Age: 78
LOS: 7 days | Discharge: HOME HEALTH | DRG: 314 | End: 2023-07-05
Attending: INTERNAL MEDICINE | Admitting: STUDENT IN AN ORGANIZED HEALTH CARE EDUCATION/TRAINING PROGRAM
Payer: MEDICARE

## 2023-06-27 VITALS
SYSTOLIC BLOOD PRESSURE: 133 MMHG | TEMPERATURE: 99 F | HEIGHT: 63 IN | HEART RATE: 111 BPM | OXYGEN SATURATION: 92 % | DIASTOLIC BLOOD PRESSURE: 92 MMHG | RESPIRATION RATE: 20 BRPM | WEIGHT: 100.09 LBS

## 2023-06-27 DIAGNOSIS — Z98.890 OTHER SPECIFIED POSTPROCEDURAL STATES: Chronic | ICD-10-CM

## 2023-06-27 DIAGNOSIS — E03.9 HYPOTHYROIDISM, UNSPECIFIED: ICD-10-CM

## 2023-06-27 DIAGNOSIS — F03.90 UNSPECIFIED DEMENTIA WITHOUT BEHAVIORAL DISTURBANCE: ICD-10-CM

## 2023-06-27 DIAGNOSIS — R94.31 ABNORMAL ELECTROCARDIOGRAM [ECG] [EKG]: ICD-10-CM

## 2023-06-27 LAB
ALBUMIN SERPL ELPH-MCNC: 4 G/DL — SIGNIFICANT CHANGE UP (ref 3.3–5.2)
ALP SERPL-CCNC: 75 U/L — SIGNIFICANT CHANGE UP (ref 40–120)
ALT FLD-CCNC: 14 U/L — SIGNIFICANT CHANGE UP
ANION GAP SERPL CALC-SCNC: 9 MMOL/L — SIGNIFICANT CHANGE UP (ref 5–17)
APTT BLD: 33.7 SEC — SIGNIFICANT CHANGE UP (ref 27.5–35.5)
AST SERPL-CCNC: 18 U/L — SIGNIFICANT CHANGE UP
BASE EXCESS BLDV CALC-SCNC: 4.9 MMOL/L — HIGH (ref -2–3)
BASOPHILS # BLD AUTO: 0.05 K/UL — SIGNIFICANT CHANGE UP (ref 0–0.2)
BASOPHILS NFR BLD AUTO: 0.5 % — SIGNIFICANT CHANGE UP (ref 0–2)
BILIRUB SERPL-MCNC: 0.3 MG/DL — LOW (ref 0.4–2)
BUN SERPL-MCNC: 17.2 MG/DL — SIGNIFICANT CHANGE UP (ref 8–20)
CA-I SERPL-SCNC: 1.26 MMOL/L — SIGNIFICANT CHANGE UP (ref 1.15–1.33)
CALCIUM SERPL-MCNC: 9.7 MG/DL — SIGNIFICANT CHANGE UP (ref 8.4–10.5)
CHLORIDE BLDV-SCNC: 108 MMOL/L — SIGNIFICANT CHANGE UP (ref 96–108)
CHLORIDE SERPL-SCNC: 107 MMOL/L — SIGNIFICANT CHANGE UP (ref 96–108)
CO2 SERPL-SCNC: 28 MMOL/L — SIGNIFICANT CHANGE UP (ref 22–29)
CREAT SERPL-MCNC: 0.88 MG/DL — SIGNIFICANT CHANGE UP (ref 0.5–1.3)
EGFR: 67 ML/MIN/1.73M2 — SIGNIFICANT CHANGE UP
EOSINOPHIL # BLD AUTO: 0.15 K/UL — SIGNIFICANT CHANGE UP (ref 0–0.5)
EOSINOPHIL NFR BLD AUTO: 1.6 % — SIGNIFICANT CHANGE UP (ref 0–6)
GAS PNL BLDV: 141 MMOL/L — SIGNIFICANT CHANGE UP (ref 136–145)
GAS PNL BLDV: SIGNIFICANT CHANGE UP
GLUCOSE BLDV-MCNC: 75 MG/DL — SIGNIFICANT CHANGE UP (ref 70–99)
GLUCOSE SERPL-MCNC: 80 MG/DL — SIGNIFICANT CHANGE UP (ref 70–99)
HCO3 BLDV-SCNC: 29 MMOL/L — SIGNIFICANT CHANGE UP (ref 22–29)
HCT VFR BLD CALC: 34.5 % — SIGNIFICANT CHANGE UP (ref 34.5–45)
HCT VFR BLDA CALC: 35 % — SIGNIFICANT CHANGE UP
HGB BLD CALC-MCNC: 11.7 G/DL — SIGNIFICANT CHANGE UP (ref 11.7–16.1)
HGB BLD-MCNC: 11.5 G/DL — SIGNIFICANT CHANGE UP (ref 11.5–15.5)
IMM GRANULOCYTES NFR BLD AUTO: 0.3 % — SIGNIFICANT CHANGE UP (ref 0–0.9)
INR BLD: 0.96 RATIO — SIGNIFICANT CHANGE UP (ref 0.88–1.16)
LACTATE BLDV-MCNC: 1 MMOL/L — SIGNIFICANT CHANGE UP (ref 0.5–2)
LYMPHOCYTES # BLD AUTO: 1.01 K/UL — SIGNIFICANT CHANGE UP (ref 1–3.3)
LYMPHOCYTES # BLD AUTO: 11 % — LOW (ref 13–44)
MAGNESIUM SERPL-MCNC: 2.1 MG/DL — SIGNIFICANT CHANGE UP (ref 1.6–2.6)
MCHC RBC-ENTMCNC: 30.7 PG — SIGNIFICANT CHANGE UP (ref 27–34)
MCHC RBC-ENTMCNC: 33.3 GM/DL — SIGNIFICANT CHANGE UP (ref 32–36)
MCV RBC AUTO: 92.2 FL — SIGNIFICANT CHANGE UP (ref 80–100)
MONOCYTES # BLD AUTO: 0.64 K/UL — SIGNIFICANT CHANGE UP (ref 0–0.9)
MONOCYTES NFR BLD AUTO: 6.9 % — SIGNIFICANT CHANGE UP (ref 2–14)
NEUTROPHILS # BLD AUTO: 7.34 K/UL — SIGNIFICANT CHANGE UP (ref 1.8–7.4)
NEUTROPHILS NFR BLD AUTO: 79.7 % — HIGH (ref 43–77)
NT-PROBNP SERPL-SCNC: 651 PG/ML — HIGH (ref 0–300)
PCO2 BLDV: 44 MMHG — HIGH (ref 39–42)
PH BLDV: 7.43 — SIGNIFICANT CHANGE UP (ref 7.32–7.43)
PLATELET # BLD AUTO: 278 K/UL — SIGNIFICANT CHANGE UP (ref 150–400)
PO2 BLDV: 184 MMHG — HIGH (ref 25–45)
POTASSIUM BLDV-SCNC: 4.2 MMOL/L — SIGNIFICANT CHANGE UP (ref 3.5–5.1)
POTASSIUM SERPL-MCNC: 4.4 MMOL/L — SIGNIFICANT CHANGE UP (ref 3.5–5.3)
POTASSIUM SERPL-SCNC: 4.4 MMOL/L — SIGNIFICANT CHANGE UP (ref 3.5–5.3)
PROT SERPL-MCNC: 6.1 G/DL — LOW (ref 6.6–8.7)
PROTHROM AB SERPL-ACNC: 11.1 SEC — SIGNIFICANT CHANGE UP (ref 10.5–13.4)
RBC # BLD: 3.74 M/UL — LOW (ref 3.8–5.2)
RBC # FLD: 13.3 % — SIGNIFICANT CHANGE UP (ref 10.3–14.5)
SAO2 % BLDV: 100 % — SIGNIFICANT CHANGE UP
SODIUM SERPL-SCNC: 143 MMOL/L — SIGNIFICANT CHANGE UP (ref 135–145)
TROPONIN T SERPL-MCNC: 0.01 NG/ML — SIGNIFICANT CHANGE UP (ref 0–0.06)
TROPONIN T SERPL-MCNC: 0.01 NG/ML — SIGNIFICANT CHANGE UP (ref 0–0.06)
TROPONIN T SERPL-MCNC: 0.02 NG/ML — SIGNIFICANT CHANGE UP (ref 0–0.06)
WBC # BLD: 9.22 K/UL — SIGNIFICANT CHANGE UP (ref 3.8–10.5)
WBC # FLD AUTO: 9.22 K/UL — SIGNIFICANT CHANGE UP (ref 3.8–10.5)

## 2023-06-27 PROCEDURE — 99223 1ST HOSP IP/OBS HIGH 75: CPT

## 2023-06-27 PROCEDURE — 99222 1ST HOSP IP/OBS MODERATE 55: CPT | Mod: 25

## 2023-06-27 PROCEDURE — 72125 CT NECK SPINE W/O DYE: CPT | Mod: 26,MG

## 2023-06-27 PROCEDURE — G1004: CPT

## 2023-06-27 PROCEDURE — 99285 EMERGENCY DEPT VISIT HI MDM: CPT

## 2023-06-27 PROCEDURE — 70450 CT HEAD/BRAIN W/O DYE: CPT | Mod: 26,MG

## 2023-06-27 PROCEDURE — 93010 ELECTROCARDIOGRAM REPORT: CPT | Mod: 77

## 2023-06-27 PROCEDURE — 71045 X-RAY EXAM CHEST 1 VIEW: CPT | Mod: 26

## 2023-06-27 RX ORDER — HALOPERIDOL DECANOATE 100 MG/ML
2.5 INJECTION INTRAMUSCULAR ONCE
Refills: 0 | Status: COMPLETED | OUTPATIENT
Start: 2023-06-27 | End: 2023-06-27

## 2023-06-27 RX ORDER — QUETIAPINE FUMARATE 200 MG/1
25 TABLET, FILM COATED ORAL ONCE
Refills: 0 | Status: COMPLETED | OUTPATIENT
Start: 2023-06-27 | End: 2023-06-27

## 2023-06-27 RX ORDER — ASPIRIN/CALCIUM CARB/MAGNESIUM 324 MG
324 TABLET ORAL ONCE
Refills: 0 | Status: COMPLETED | OUTPATIENT
Start: 2023-06-27 | End: 2023-06-27

## 2023-06-27 RX ADMIN — Medication 324 MILLIGRAM(S): at 16:05

## 2023-06-27 RX ADMIN — HALOPERIDOL DECANOATE 2.5 MILLIGRAM(S): 100 INJECTION INTRAMUSCULAR at 19:45

## 2023-06-27 RX ADMIN — QUETIAPINE FUMARATE 25 MILLIGRAM(S): 200 TABLET, FILM COATED ORAL at 21:58

## 2023-06-27 NOTE — H&P ADULT - NSHPPHYSICALEXAM_GEN_ALL_CORE
Vital Signs Last 24 Hrs  T(C): 36.6 (28 Jun 2023 05:10), Max: 37.3 (27 Jun 2023 16:25)  T(F): 97.9 (28 Jun 2023 05:10), Max: 99.1 (27 Jun 2023 16:25)  HR: 72 (28 Jun 2023 05:10) (72 - 111)  BP: 157/95 (28 Jun 2023 05:10) (133/92 - 179/95)  BP(mean): --  RR: 19 (28 Jun 2023 05:10) (18 - 20)  SpO2: 100% (28 Jun 2023 05:10) (92% - 100%)    Parameters below as of 28 Jun 2023 05:10  Patient On (Oxygen Delivery Method): room air    GENERAL:  Well-appearing, not in acute distress  EYES:  Clear conjunctiva, extraocular movement intact  ENT: Moist mucous membranes  RESP:  Non-labored breathing pattern, lungs clear to ausculation in anterior fields  CV: Regular rate and rhythm, no murmurs appreciated, no lower extremity edema  GI: Soft, non-tender, non-distended  NEURO: Awake, alert, conversant, upper and lower extremity strength 5/5, light touch sensation grossly intact  PSYCH: Calm, cooperative  SKIN: No rash or lesions, warm and dry

## 2023-06-27 NOTE — ED ADULT NURSE NOTE - OBJECTIVE STATEMENT
pt received in yellow gown with belongings secured.  Pt a&ox1 reporting lower abdominal pain.  Denies CP, SOB, fever, chills, N/V.  Cardiac monitoring and pulse oximetry initiated, NSR on monitor, O2 sat 98% onRA. NAD noted, respirations even and unlabored.  Safety precautions in place.  Plan of care explained, pt verbalized understanding.

## 2023-06-27 NOTE — ED PROVIDER NOTE - PHYSICAL EXAMINATION
General:     NAD  Head:     NC/AT, EOMI, oral mucosa moist  Neck:     trachea midline  Lungs:     CTA b/l, no w/r/r  CVS:     S1S2, RRR, no m/g/r  Abd:     +BS, s/nt/nd, no organomegaly  Ext:    2+ radial and pedal pulses, no c/c/e. abrasion to left elbow, from x4 extremities.   Neuro: AAOx2, no sensory/motor deficits

## 2023-06-27 NOTE — H&P ADULT - TIME BILLING
chart review, patient interview, documentation.  Plan discussed with nurse. chart review, patient interview, documentation.  Plan discussed with nurse.  Attempting to call debi Roth with number found in a previous H&P note, but no answer.  Also called emergency contact in EMR and unable to reach anyone.

## 2023-06-27 NOTE — ED ADULT NURSE REASSESSMENT NOTE - NS ED NURSE REASSESS COMMENT FT1
Assumed care for patient from RN Patient became agitated, screaming, aggressive, ripping off her leads. Dr ashlie Perez made aware, Haldol 2.5mg IM.

## 2023-06-27 NOTE — CONSULT NOTE ADULT - SUBJECTIVE AND OBJECTIVE BOX
[facility]                                              CARDIOLOGY AT [address], [place] [address]                                             Telephone: [phone]. Fax:[phone]                                                       CARDIOLOGY CONSULTATION NOTE                                                                                             History obtained by: Patient and medical record  Community Cardiologist: unknown   obtained: Yes [  ] No [  ]  Reason for Consultation: anbl ekg  Available out pt records reviewed: Yes [  ] No [  ]    Chief complaint:    Patient is a 77y old  Female who presents with a chief complaint of cofusion    HPI: 78 y/o F with PMH dementia, hypothyroidism, HTN, copd, L hip ORIF brought to ED after found wandering outside. Contusion to L elbow. 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 Denies chest pain, shortness of breath. C/o lower abd pain, says better after goes to the bathroom. Cardiology called for evaluation of EKG changes evolving ST depressions, new to inferior leads on repeat EKG. Troponin neg x 1.         PAST MEDICAL HISTORY  HTN (hypertension)  Dementia  Hypothyroidism  History of asthma        PAST SURGICAL HISTORY  S/P ORIF (open reduction internal fixation) fracture        SOCIAL HISTORY:  Denies smoking/alcohol/drugs  CIGARETTES:   former smoker  ALCOHOL: ?unknown  DRUGS: ?unknownj    FAMILY HISTORY:  unknown      HOME MEDICATIONS:  acetaminophen 325 mg oral tablet: 2 tab(s) orally every 6 hours, As needed, Temp greater or equal to 38C (100.4F), Mild Pain (1 - 3) (29 Apr 2022 08:58)  albuterol 90 mcg/inh inhalation aerosol: 1 puff(s) inhaled 3 times a day, As needed, Shortness of Breath and/or Wheezing (29 Apr 2022 08:58)  benazepril 10 mg oral tablet: 1 tab(s) orally once a day (24 Apr 2022 23:30)  cholecalciferol oral tablet: 800 unit(s) orally once a day (29 Apr 2022 08:58)  cyanocobalamin 1000 mcg oral tablet: 1 tab(s) orally once a day (29 Apr 2022 08:58)  enoxaparin: 40 milligram(s) subcutaneous once a day   for dvt ppx  (29 Apr 2022 08:58)  Euthyrox 100 mcg (0.1 mg) oral tablet: 1 tab(s) orally once a day (29 Apr 2022 08:58)  melatonin 3 mg oral tablet: 1 tab(s) orally once a day (at bedtime), As needed, Insomnia (29 Apr 2022 08:58)  oxyCODONE 5 mg oral tablet: 1 tab(s) orally every 4 hours, As needed, Severe Pain (7 - 10) (29 Apr 2022 08:58)  polyethylene glycol 3350 oral powder for reconstitution: 17 gram(s) orally once a day (29 Apr 2022 08:58)  QUEtiapine 25 mg oral tablet: 1 tab(s) orally 2 times a day (29 Apr 2022 08:58)  senna oral tablet: 2 tab(s) orally once a day (at bedtime) (29 Apr 2022 08:58)      CURRENT CARDIAC MEDICATIONS:      CURRENT OTHER MEDICATIONS:      ALLERGIES:   No Known Allergies      REVIEW OF SYMPTOMS:   CONSTITUTIONAL: No fever, no chills, no weight loss, no weight gain, no fatigue   ENMT:  No vertigo; No sinus or throat pain  NECK: No pain or stiffness  CARDIOVASCULAR: No chest pain, no dyspnea, no syncope/presyncope, no palpitations, no dizziness, no Orthopnea, no Paroxsymal nocturnal dyspnea  RESPIRATORY: no Shortness of breath, no cough, no wheezing  : No dysuria, no hematuria   GI: No dark color stool, no nausea, no diarrhea, no constipation, no abdominal pain   NEURO: No headache, no slurred speech   MUSCULOSKELETAL: No joint pain or swelling; No muscle, back, or extremity pain  PSYCH: No agitation, no anxiety.    ALL OTHER REVIEW OF SYSTEMS ARE NEGATIVE.    VITAL SIGNS:  T(C): 37.3 (06-27-23 @ 16:25), Max: 37.3 (06-27-23 @ 16:25)  T(F): 99.1 (06-27-23 @ 16:25), Max: 99.1 (06-27-23 @ 16:25)  HR: 94 (06-27-23 @ 16:25) (94 - 111)  BP: 145/81 (06-27-23 @ 16:25) (133/92 - 145/81)  RR: 19 (06-27-23 @ 16:25) (19 - 20)  SpO2: 94% (06-27-23 @ 16:25) (92% - 94%)         PHYSICAL EXAM:  Constitutional: Comfortable . No acute distress.   HEENT: Atraumatic and normocephalic , neck is supple . no JVD.   CNS: A&Ox2. No focal deficits.   Respiratory: CTAB, unlabored   Cardiovascular: RRR normal s1 s2. No murmur  Gastrointestinal: Soft, non-tender. +Bowel sounds.   Extremities: No edema. diffuse generalized ecchymosis   Psychiatric: Calm . no agitation.   Skin: Warm and dry, no ulcers on extremities     LABS:  ( 27 Jun 2023 16:08 )  Troponin T  0.01 ,  CPK  X    , CKMB  X    , BNP X                                11.5   9.22  )-----------( 278      ( 27 Jun 2023 16:08 )             34.5     06-27    143  |  107  |  17.2  ----------------------------<  80  4.4   |  28.0  |  0.88    Ca    9.7      27 Jun 2023 16:08  Mg     2.1     06-27    TPro  6.1<L>  /  Alb  4.0  /  TBili  0.3<L>  /  DBili  x   /  AST  18  /  ALT  14  /  AlkPhos  75  06-27    PT/INR - ( 27 Jun 2023 16:08 )   PT: 11.1 sec;   INR: 0.96 ratio         PTT - ( 27 Jun 2023 16:08 )  PTT:33.7 sec  Urinalysis Basic - ( 27 Jun 2023 16:08 )    Color: x / Appearance: x / SG: x / pH: x  Gluc: 80 mg/dL / Ketone: x  / Bili: x / Urobili: x   Blood: x / Protein: x / Nitrite: x   Leuk Esterase: x / RBC: x / WBC x   Sq Epi: x / Non Sq Epi: x / Bacteria: x      INTERPRETATION OF TELEMETRY: SR< ST    ECG: SR, ST depression anterolateral leads, repeat EKG with depression inferior leads.

## 2023-06-27 NOTE — ED ADULT NURSE NOTE - CHIEF COMPLAINT QUOTE
Pt BIBA found wandering up and down street that she lives on.  Pt with PMH of Dementia and Afib arrives A&Ox1-2.  Pt states "I climbed out of the window because my  decided I was his punching bag."  As per EMS, pt lives at home with son and no  that they know about.  Skin tear to left forearm; bleeding controlled with gauze; as per EMS is old wound

## 2023-06-27 NOTE — ED ADULT NURSE NOTE - NSFALLRISKINTERV_ED_ALL_ED
Assistance OOB with selected safe patient handling equipment if applicable/Assistance with ambulation/Communicate fall risk and risk factors to all staff, patient, and family/Monitor gait and stability/Monitor for mental status changes and reorient to person, place, and time, as needed/Provide visual cue: yellow wristband, yellow gown, etc/Reinforce activity limits and safety measures with patient and family/Toileting schedule using arm’s reach rule for commode and bathroom/Use of alarms - bed, stretcher, chair and/or video monitoring/Call bell, personal items and telephone in reach/Instruct patient to call for assistance before getting out of bed/chair/stretcher/Non-slip footwear applied when patient is off stretcher/[place] to call system/Physically safe environment - no spills, clutter or unnecessary equipment/Purposeful Proactive Rounding/Room/bathroom lighting operational, light cord in reach

## 2023-06-27 NOTE — ED ADULT NURSE NOTE - PLAN OF CARE
Medicare Wellness Visit  Plan for Preventive Care    A good way for you to stay healthy is to use preventive care.  Medicare covers many services that can help you stay healthy.* The goal of these services is to find any health problems as quickly as possible. Finding problems early can help make them easier to treat.  Your personal plan below lists the services you may need and when they are due.      Health Maintenance Summary     Diabetes Eye Exam (Yearly)  Overdue - never done    DTaP/Tdap/Td Vaccine (3 - Td or Tdap)  Overdue since 7/18/2021    Medicare Advantage- Medicare Wellness Visit (Yearly - January to December)  Overdue since 1/1/2023    DM/CKD Microalbumin (Yearly)  Next due on 7/18/2023    Diabetes Foot Exam (Yearly)  Next due on 7/25/2023    Diabetes A1C (Every 6 Months)  Next due on 10/17/2023    Depression Screening (Yearly)  Next due on 1/20/2024    DM/CKD GFR (Yearly)  Next due on 4/17/2024    Colorectal Cancer Risk - Colonoscopy (Every 5 Years)  Next due on 11/1/2026    Hepatitis B Vaccine (For Physician/APC Discussion)   Completed    Hepatitis C Screening   Completed    Abdominal Aortic Aneurysm (AAA) Screening   Completed    Pneumococcal Vaccine 65+   Completed    Influenza Vaccine   Completed    COVID-19 Vaccine   Completed    Shingles Vaccine   Completed    Meningococcal Vaccine   Aged Out    HPV Vaccine   Aged Out           Preventive Care for Women and Men    Heart Screenings (Cardiovascular):  · Blood tests are used to check your cholesterol, lipid and triglyceride levels. High levels can increase your risk for heart disease and stroke. High levels can be treated with medications, diet and exercise. Lowering your levels can help keep your heart and blood vessels healthy.  Your provider will order these tests if they are needed.    · An ultrasound is done to see if you have an abdominal aortic aneurysm (AAA).  This is an enlargement of one of the main blood vessels that delivers blood to  the body.   In the [place], 9,000 deaths are caused by AAA.  You may not even know you have this problem and as many as 1 in 3 people will have a serious problem if it is not treated.  Early diagnosis allows for more effective treatment and cure.  If you have a family history of AAA or are a male age 65-75 who has smoked, you are at higher risk of an AAA.  Your provider can order this test, if needed.    Colorectal Screening:  · There are many tests that are used to check for cancer of your colon and rectum. You and your provider should discuss what test is best for you and when to have it done.  Options include:  · Screening Colonoscopy: exam of the entire colon, seen through a flexible lighted tube.  · Flexible Sigmoidoscopy: exam of the last third (sigmoid portion) of the colon and rectum, seen through a flexible lighted tube.  · Cologuard DNA stool test: a sample of your stool is used to screen for cancer and unseen blood in your stool.  · Fecal Occult Blood Test: a sample of your stool is studied to find any unseen blood    Flu Shot:  · An immunization that helps to prevent influenza (the flu). You should get this every year. The best time to get the shot is in the fall.    Pneumococcal Shot:  • Vaccines help prevent pneumococcal disease, which is any type of illness caused by Streptococcus pneumoniae bacteria. There are two kinds of pneumococcal vaccines available in the [place]:   o Pneumococcal conjugate vaccines (PCV20 or Adnnnyh15®)  o Pneumococcal polysaccharide vaccine (PPSV23 or Ivhpbgrwo48®)  · For those who have never received any pneumococcal conjugate vaccine, CDC recommends PVC20 for adults 65 years or older and adults 19 through 64 years old with certain medical conditions or risk factors.   · For those who have previously received PCV13, this should be followed by a dose of PPSV23.     Hepatitis B Shot:  · An immunization that helps to protect people from getting Hepatitis B.  Hepatitis B is a virus that spreads through contact with infected blood or body fluids. Many people with the virus do not have symptoms.  The virus can lead to serious problems, such as liver disease. Some people are at higher risk than others. Your doctor will tell you if you need this shot.     Diabetes Screening:  · A test to measure sugar (glucose) in your blood is called a fasting blood sugar. Fasting means you cannot have food or drink for at least 8 hours before the test. This test can detect diabetes long before you may notice symptoms.    Glaucoma Screening:  · Glaucoma screening is performed by your eye doctor. The test measures the fluid pressure inside your eyes to determine if you have glaucoma.     Hepatitis C Screening:  · A blood test to see if you have the hepatitis C virus.  Hepatitis C attacks the liver and is a major cause of chronic liver disease.  Medicare will cover a single screening for all adults born between 1945 & 1965, or high risk patients (people who have injected illegal drugs or people who have had blood transfusions).  High risk patients who continue to inject illegal drugs can be screened for Hepatitis C every year.    Smoking and Tobacco-Use Cessation Counseling:  · Tobacco is the single greatest cause of disease and early death in our country today. Medication and counseling together can increase a person’s chance of quitting for good.   · Medicare covers two quitting attempts per year, with four counseling sessions per attempt (eight sessions in a 12 month period)    Preventive Screening tests for Women    Screening Mammograms and Breast Exams:  · An x-ray of your breasts to check for breast cancer before you or your doctor may be able to feel it.  If breast cancer is found early it can usually be treated with success.    Pelvic Exams and Pap Tests:  · An exam to check for cervical and vaginal cancer. A Pap test is a lab test in which cells are taken from your cervix and sent to  the lab to look for signs of cervical cancer. If cancer of the cervix is found early, chances for a cure are good. Testing can generally end at age 65, or if a woman has a hysterectomy for a benign condition. Your provider may recommend more frequent testing if certain abnormal results are found.    Bone Mass Measurements:  · A painless x-ray of your bone density to see if you are at risk for a broken bone. Bone density refers to the thickness of bones or how tightly the bone tissue is packed.    Preventive Screening tests for Men    Prostate Screening:  · Should you have a prostate cancer test (PSA)?  It is up to you to decide if you want a prostate cancer test. Talk to your clinician to find out if the test is right for you.  Things for you to consider and talk about should include:  · Benefits and harms of the test  · Your family history  · How your race/ethnicity may influence the test  · If the test may impact other medical conditions you have  · Your values on screenings and treatments    *Medicare pays for many preventive services to keep you healthy. For some of these services, you might have to pay a deductible, coinsurance, and / or copayment.  The amounts vary depending on the type of services you need and the kind of Medicare health plan you have.    For further details on screenings offered by Medicare please visit: https://www.medicare.gov/coverage/preventive-screening-services    Call bell/Explanation of exam/test

## 2023-06-27 NOTE — ED PROVIDER NOTE - CLINICAL SUMMARY MEDICAL DECISION MAKING FREE TEXT BOX
78 y/o female, PMHx dementia, hypothyroidism, HTN, COPD, s/p L hip ORIF 04/2022); now p/w wandering outdoors.  patient c/o contusion over left elbow.  patient found to have dynamic ekg changes with twi initially v4-v5, then diffuse II, III, aVF, v4-v6.  seen by cardiology and will admit for further w/up.

## 2023-06-27 NOTE — ED PROVIDER NOTE - OBJECTIVE STATEMENT
76 y/o female, PMHx dementia, hypothyroidism, HTN, COPD, s/p L hip ORIF 04/2022); now p/w wandering outdoors.  patient c/o contusion over left elbow. denies head trauma. denies loc. denies cp/sob/palp. denies dizziness. denies numbness/tingling.  denies focal weakness. denies abd pain. denies back pain. denie sn/v. denies diaphoresis.  PMH: dementia, hypothyroidism, HTN, COPD, s/p L hip ORIF 04/2022)  SOCIAL: denies substance abuse

## 2023-06-27 NOTE — H&P ADULT - HISTORY OF PRESENT ILLNESS
77yoF hx dementia, HTN, COPD who was brought in by EMS after she was found wandering on the street she [place].  Pt awake, alert and conversant but is unreliable historian who told EMS that she climbed out of the window and told me during interview that she lives with her 21 year old mother and just came from work.  While in ED, multiple ECGs were performed that showed dynamic ECG changes with new TWI in inferior leads that was not seen on initial ECG.  Pt does not report any active symptoms during interview.  77yoF hx dementia, HTN, COPD who was brought in by EMS after she was found wandering on the street she [place].  Pt awake, alert and conversant but is unreliable historian who told EMS that she climbed out of the window and told me during interview that she lives with her 21 year old mother and just came from work.  While in ED, multiple ECGs were performed that showed dynamic ECG changes with new TWI in inferior leads that was not seen on initial ECG.  Pt does not report any active symptoms during interview. Attempted to call debi Roth overnight for collateral history, but unable to reach him. 77yoF hx dementia, HTN, COPD who was brought in by EMS after she was found wandering on the street she lives [place].  Pt awake, alert and conversant but is unreliable historian who told EMS that she climbed out of the window and told me during interview that she lives with her 21 year old mother and just came from work.  While in ED, multiple ECGs were performed that showed dynamic ECG changes with new TWI in inferior leads that was not seen on initial ECG prompting admission to medicine.  Pt does not report any active symptoms during interview. Attempted to call son Gonzalez overnight for collateral history, but unable to reach him.  Of note, pt with episodes of agitation and attempting to get out of bed in the ED.

## 2023-06-27 NOTE — CONSULT NOTE ADULT - NS ATTEND AMEND GEN_ALL_CORE FT
Patient seen and examined by me.    T(C): 36.7 (06-27-23 @ 21:53), Max: 37.3 (06-27-23 @ 16:25)  HR: 87 (06-27-23 @ 21:53) (87 - 111)  BP: 179/95 (06-27-23 @ 21:53) (133/92 - 179/95)  RR: 19 (06-27-23 @ 21:53) (19 - 20)  SpO2: 97% (06-27-23 @ 21:53) (92% - 97%)  Patient alert and awake.  Chest- Bilateral Clear BS  Cardiac- S1 and S2  Abdomen- Soft    Assessment/Plan:  Unable to reach any family with the available phone numbers  I have discussed my recommendation with the PA which are outlined above.  Will follow.

## 2023-06-27 NOTE — CONSULT NOTE ADULT - ASSESSMENT
78 y/o F with PMH dementia, hypothyroidism, HTN, copd, L hip ORIF brought to ED after found wandering outside. Contusion to L elbow. 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 Denies chest pain, shortness of breath. C/o lower abd pain, says better after goes to the bathroom. Cardiology called for evaluation of EKG changes evolving ST depressions, new to inferior leads on repeat EKG. Troponin neg x 1.

## 2023-06-27 NOTE — H&P ADULT - NSHPLABSRESULTS_GEN_ALL_CORE
06-27    143  |  107  |  17.2  ----------------------------<  80  4.4   |  28.0  |  0.88    Ca    9.7      27 Jun 2023 16:08  Mg     2.1     06-27    TPro  6.1<L>  /  Alb  4.0  /  TBili  0.3<L>  /  DBili  x   /  AST  18  /  ALT  14  /  AlkPhos  75  06-27                            11.5   9.22  )-----------( 278      ( 27 Jun 2023 16:08 )             34.5      1st ECG: sinus tachycardia, , TWI in V4-V6    2nd ECG: NSR, HR 95, new TWI in lead II, III, aVF, TWI still seen in V4-V6    3rd ECG: NSR, HR 86, TWI in inferior leads resolved, persistent TWI in V3-V6

## 2023-06-27 NOTE — CONSULT NOTE ADULT - PROBLEM SELECTOR RECOMMENDATION 9
.  - new ST changes to inferior leads.   - pt with dementia, poor historian, endorses lower abd pain  - denies chest pain, shortness of breath.   - former smoker  - cont to trend troponin, serial EKGs  - cont telemetry monitoring   - unable to get in touch with son/family to verify medical history  - a1c lipid panel in am   - if repeat troponin elevated can start heparin gtt.

## 2023-06-27 NOTE — H&P ADULT - ASSESSMENT
77yoF hx dementia, HTN, COPD who was brought in by EMS after she was found wandering on the street she [place] on and was found to have showed dynamic ECG changes with new TWI in inferior leads that was not seen on initial ECG    Abnormal ECG   -Multiple EKGs with dynamic ST-T changes in inferior leads, anterior-lateral leads  -Troponin negative x 3  -No reported chest pain, but unreliable historian due to dementia  -Seen by cardiology NP and TTE ordered  -Repeat cardiac enzyme, EKG in AM  -Check HgbA1c and lipid panel  -Start ASA 81mg daily  -Resume statin  -Telemetry     Hx dementia with behavioral disturbance  -CT head w/out acute abnormality  -Episodes of attempting to get out of bed, removing monitors  -Received IM Haldol 2.5mg and Seroquel 25mg by ED  -Given additional IV Haldol 1mg x 1 due to for recurrent agitation  -Constant observation ordered  -Caution with further Haldol due to QT prolongation (470s)  -[facility] list includes Ativan 1mg TID PRN, unclear if taking or if benzo is optimal agent  -Psychiatry consulted for AM     Hx HTN  -Interchange benazepril 10mg with lisinopril 10mg daily     Hx HLD  -Atorvastatin 10mg daily    Hx COPD  -Pulmicort and DuoNeb PRN    Medication management  -Med rec partially completed via [facility]  -Unable to reach debi Roth overnight to obtain meds  -Can call [facility] pharmacy and re-try son again n AM    Prophylactic measure  -Lovenox 40mg daily

## 2023-06-28 LAB
A1C WITH ESTIMATED AVERAGE GLUCOSE RESULT: 5.4 % — SIGNIFICANT CHANGE UP (ref 4–5.6)
ANION GAP SERPL CALC-SCNC: 11 MMOL/L — SIGNIFICANT CHANGE UP (ref 5–17)
BUN SERPL-MCNC: 14.2 MG/DL — SIGNIFICANT CHANGE UP (ref 8–20)
CALCIUM SERPL-MCNC: 10.1 MG/DL — SIGNIFICANT CHANGE UP (ref 8.4–10.5)
CHLORIDE SERPL-SCNC: 106 MMOL/L — SIGNIFICANT CHANGE UP (ref 96–108)
CHOLEST SERPL-MCNC: 206 MG/DL — HIGH
CK MB CFR SERPL CALC: 6.1 NG/ML — SIGNIFICANT CHANGE UP (ref 0–6.7)
CK SERPL-CCNC: 232 U/L — HIGH (ref 25–170)
CO2 SERPL-SCNC: 27 MMOL/L — SIGNIFICANT CHANGE UP (ref 22–29)
CREAT SERPL-MCNC: 0.74 MG/DL — SIGNIFICANT CHANGE UP (ref 0.5–1.3)
EGFR: 82 ML/MIN/1.73M2 — SIGNIFICANT CHANGE UP
ESTIMATED AVERAGE GLUCOSE: 108 MG/DL — SIGNIFICANT CHANGE UP (ref 68–114)
GLUCOSE SERPL-MCNC: 101 MG/DL — HIGH (ref 70–99)
HDLC SERPL-MCNC: 82 MG/DL — SIGNIFICANT CHANGE UP
LIPID PNL WITH DIRECT LDL SERPL: 107 MG/DL — HIGH
MAGNESIUM SERPL-MCNC: 2 MG/DL — SIGNIFICANT CHANGE UP (ref 1.6–2.6)
NON HDL CHOLESTEROL: 124 MG/DL — SIGNIFICANT CHANGE UP
POTASSIUM SERPL-MCNC: 4.4 MMOL/L — SIGNIFICANT CHANGE UP (ref 3.5–5.3)
POTASSIUM SERPL-SCNC: 4.4 MMOL/L — SIGNIFICANT CHANGE UP (ref 3.5–5.3)
SODIUM SERPL-SCNC: 144 MMOL/L — SIGNIFICANT CHANGE UP (ref 135–145)
TRIGL SERPL-MCNC: 84 MG/DL — SIGNIFICANT CHANGE UP
TROPONIN T SERPL-MCNC: <0.01 NG/ML — SIGNIFICANT CHANGE UP (ref 0–0.06)
TSH SERPL-MCNC: 5.73 UIU/ML — HIGH (ref 0.27–4.2)

## 2023-06-28 PROCEDURE — 93010 ELECTROCARDIOGRAM REPORT: CPT

## 2023-06-28 PROCEDURE — 99223 1ST HOSP IP/OBS HIGH 75: CPT

## 2023-06-28 PROCEDURE — 99233 SBSQ HOSP IP/OBS HIGH 50: CPT

## 2023-06-28 PROCEDURE — 99232 SBSQ HOSP IP/OBS MODERATE 35: CPT

## 2023-06-28 RX ORDER — TRAZODONE HCL 50 MG
50 TABLET ORAL AT BEDTIME
Refills: 0 | Status: DISCONTINUED | OUTPATIENT
Start: 2023-06-28 | End: 2023-07-05

## 2023-06-28 RX ORDER — LISINOPRIL 2.5 MG/1
10 TABLET ORAL DAILY
Refills: 0 | Status: DISCONTINUED | OUTPATIENT
Start: 2023-06-28 | End: 2023-07-03

## 2023-06-28 RX ORDER — IPRATROPIUM/ALBUTEROL SULFATE 18-103MCG
3 AEROSOL WITH ADAPTER (GRAM) INHALATION EVERY 6 HOURS
Refills: 0 | Status: DISCONTINUED | OUTPATIENT
Start: 2023-06-28 | End: 2023-07-02

## 2023-06-28 RX ORDER — ONDANSETRON 8 MG/1
4 TABLET, FILM COATED ORAL EVERY 6 HOURS
Refills: 0 | Status: DISCONTINUED | OUTPATIENT
Start: 2023-06-28 | End: 2023-07-05

## 2023-06-28 RX ORDER — BUDESONIDE, MICRONIZED 100 %
2 POWDER (GRAM) MISCELLANEOUS
Refills: 0 | DISCHARGE

## 2023-06-28 RX ORDER — ACETAMINOPHEN 500 MG
650 TABLET ORAL EVERY 6 HOURS
Refills: 0 | Status: DISCONTINUED | OUTPATIENT
Start: 2023-06-28 | End: 2023-07-05

## 2023-06-28 RX ORDER — BUDESONIDE, MICRONIZED 100 %
0.5 POWDER (GRAM) MISCELLANEOUS
Refills: 0 | Status: DISCONTINUED | OUTPATIENT
Start: 2023-06-28 | End: 2023-07-05

## 2023-06-28 RX ORDER — HALOPERIDOL DECANOATE 100 MG/ML
1 INJECTION INTRAMUSCULAR ONCE
Refills: 0 | Status: COMPLETED | OUTPATIENT
Start: 2023-06-28 | End: 2023-06-28

## 2023-06-28 RX ORDER — ALBUTEROL 90 UG/1
2 AEROSOL, METERED ORAL
Refills: 0 | DISCHARGE

## 2023-06-28 RX ORDER — ATORVASTATIN CALCIUM 80 MG/1
1 TABLET, FILM COATED ORAL
Refills: 0 | DISCHARGE

## 2023-06-28 RX ORDER — ARFORMOTEROL TARTRATE 15 UG/2ML
2 SOLUTION RESPIRATORY (INHALATION)
Refills: 0 | DISCHARGE

## 2023-06-28 RX ORDER — OLANZAPINE 15 MG/1
2.5 TABLET, FILM COATED ORAL EVERY 6 HOURS
Refills: 0 | Status: DISCONTINUED | OUTPATIENT
Start: 2023-06-28 | End: 2023-07-05

## 2023-06-28 RX ORDER — ASPIRIN/CALCIUM CARB/MAGNESIUM 324 MG
81 TABLET ORAL DAILY
Refills: 0 | Status: DISCONTINUED | OUTPATIENT
Start: 2023-06-28 | End: 2023-07-05

## 2023-06-28 RX ORDER — ENOXAPARIN SODIUM 100 MG/ML
40 INJECTION SUBCUTANEOUS EVERY 24 HOURS
Refills: 0 | Status: DISCONTINUED | OUTPATIENT
Start: 2023-06-28 | End: 2023-07-05

## 2023-06-28 RX ORDER — ATORVASTATIN CALCIUM 80 MG/1
10 TABLET, FILM COATED ORAL AT BEDTIME
Refills: 0 | Status: DISCONTINUED | OUTPATIENT
Start: 2023-06-28 | End: 2023-07-05

## 2023-06-28 RX ADMIN — ATORVASTATIN CALCIUM 10 MILLIGRAM(S): 80 TABLET, FILM COATED ORAL at 20:45

## 2023-06-28 RX ADMIN — LISINOPRIL 10 MILLIGRAM(S): 2.5 TABLET ORAL at 05:31

## 2023-06-28 RX ADMIN — Medication 0.5 MILLIGRAM(S): at 21:24

## 2023-06-28 RX ADMIN — Medication 0.5 MILLIGRAM(S): at 09:14

## 2023-06-28 RX ADMIN — HALOPERIDOL DECANOATE 1 MILLIGRAM(S): 100 INJECTION INTRAMUSCULAR at 03:10

## 2023-06-28 RX ADMIN — Medication 50 MILLIGRAM(S): at 20:46

## 2023-06-28 RX ADMIN — OLANZAPINE 2.5 MILLIGRAM(S): 15 TABLET, FILM COATED ORAL at 21:35

## 2023-06-28 RX ADMIN — Medication 3 MILLILITER(S): at 09:14

## 2023-06-28 NOTE — PROGRESS NOTE ADULT - ASSESSMENT
A/P: 76 y/o F with PMH dementia, hypothyroidism, HTN, copd, L hip ORIF brought to ED after found wandering outside. Contusion to L elbow. 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 Denies chest pain, shortness of breath. C/o lower abd pain, says better after goes to the bathroom. Cardiology called for evaluation of EKG changes evolving ST depressions, new to inferior leads on repeat EKG. Troponin neg x 1.

## 2023-06-28 NOTE — PROGRESS NOTE ADULT - ASSESSMENT
77yoF hx dementia, HTN, COPD who was brought in by EMS after she was found wandering on the [place] she [place] and was found to have showed dynamic ECG changes with new TWI in inferior leads that was not seen on initial ECG    Abnormal ECG   - Telemetry monitoring  - Multiple EKGs with dynamic ST-T changes in inferior leads, anterior-lateral leads  - Troponin negative x 3  - TTE pending  - Cardiology recs appreciated  - Aspirin 81mg daily  - Lipitor 10mg nightly     Hx dementia with behavioral disturbance  - CT head w/out acute abnormality  - Episodes of attempting to get out of bed, removing monitors  - Received IM Haldol 2.5mg and Seroquel 25mg by ED  - Psychiatry consult pending    Hx HTN  - Interchange benazepril 10mg with lisinopril 10mg daily     Hx HLD  - Atorvastatin 10mg daily    Hx COPD  - Pulmicort and DuoNeb PRN    Prophylactic measure  -Lovenox 40mg daily

## 2023-06-28 NOTE — BH CONSULTATION LIAISON ASSESSMENT NOTE - NSBHMSEREMMEM_PSY_A_CORE
Retention Suture Text: Retention sutures were placed to support the closure and prevent dehiscence. Impaired

## 2023-06-28 NOTE — PROGRESS NOTE ADULT - SUBJECTIVE AND OBJECTIVE BOX
Chief complaint: EKG    Patient seen and examined at bedside. No acute overnight events reported. No fever, chills, cough, nausea or vomiting.     Vital Signs Last 24 Hrs  T(F): 98.3 (28 Jun 2023 08:44), Max: 99.1 (27 Jun 2023 16:25)  HR: 85 (28 Jun 2023 09:40) (72 - 111)  BP: 135/85 (28 Jun 2023 08:44) (133/92 - 179/95)  RR: 19 (28 Jun 2023 08:44) (18 - 20)  SpO2: 98% (28 Jun 2023 09:40) (92% - 100%)    Physical Exam:  Constitutional: alert, in no acute distress   Neck: Soft and supple  Respiratory: Good air entry b/l  Cardiovascular: Regular rate and rhythm  Gastrointestinal: Soft, non-tender to palpation, +bs  Musculoskeletal: no lower extremity edema bilaterally    Labs:                        11.5   9.22  )-----------( 278      ( 27 Jun 2023 16:08 )             34.5   06-28    144  |  106  |  14.2  ----------------------------<  101<H>  4.4   |  27.0  |  0.74    Ca    10.1      28 Jun 2023 07:20  Mg     2.0     06-28    TPro  6.1<L>  /  Alb  4.0  /  TBili  0.3<L>  /  DBili  x   /  AST  18  /  ALT  14  /  AlkPhos  75  06-27

## 2023-06-28 NOTE — BH CONSULTATION LIAISON ASSESSMENT NOTE - SUMMARY
Patient is a 77 year old female who is retired, living with her son and daughter in law, with no past psychiatric history but with a history of dementia who as well asa HTN and COPD who was brought in by EMS after being found wandering on the street and admitted due to an abnormal EKG. Psychiatry consulted for agitation.     Patient seen and evaluated, with baseline dementia with worsening confusion and irritability     Recs:   -Maintain delirium precautions   -Avoid anticholinergic agents, benzos, opioid as they can further perpetuate confusion   -Frequent re orientation, Hydration, try to avoid restraints and if possible, mobilize patient and PT involvement  -r/o any potential medical pathology that may be worsening her mental status.   -Recommend to check B12, folate, Vit D   -EKG done and Qt within range  -Start Trazodone 50mg pO at bedtime for sleep   -For any acute agitation, recommend Zyprexa 2.5mg IM Q6 hours PRN

## 2023-06-28 NOTE — BH CONSULTATION LIAISON ASSESSMENT NOTE - NSBHCHARTREVIEWLAB_PSY_A_CORE FT
Basic Metabolic Panel (06.28.23 @ 07:20)    Sodium: 144 mmol/L    Potassium: 4.4 mmol/L    Chloride: 106: Chloride reference range changed from ..10/26/2022 mmol/L    Carbon Dioxide: 27.0 mmol/L    Anion Gap: 11 mmol/L    Blood Urea Nitrogen: 14.2 mg/dL    Creatinine: 0.74 mg/dL    Glucose: 101 mg/dL    Calcium: 10.1 mg/dL    eGFR: 82: The estimated glomerular filtration rate (eGFR) is calculated using the  2021 CKD-EPI creatinine equation, which does not have a coefficient for  race and is validated in individuals 18 years of age and older (N Engl J  Med 2021; 385:9615-9897). Creatinine-based eGFR may be inaccurate in  various situations including but not limited to extremes of muscle mass,  altered dietary protein intake, or medications that affect renal tubular  creatinine secretion. mL/min/1.73m2

## 2023-06-28 NOTE — PROGRESS NOTE ADULT - SUBJECTIVE AND OBJECTIVE BOX
[facility]                                                         CARDIOLOGY AT [facility]                                                                  [address] [facility], [facility] [place] [address]                                                         Telephone: [phone]. Fax:[phone]                                                                             PROGRESS NOTE    Reason for follow up: Abnormal EKG  Update: Patient is still very confused, on 1:1. Patient with no complaints of chest pain or dyspnea. Troponins remain negative, and echocardiogram is pending.       Review of symptoms:   Cardiac:  No chest pain. No dyspnea. No palpitations.  Respiratory: no cough. No dyspnea  Gastrointestinal: No diarrhea. No abdominal pain. No bleeding.   Neuro: No focal neuro complaints.    Vitals:  T(C): 36.8 (06-28-23 @ 08:44), Max: 37.3 (06-27-23 @ 16:25)  HR: 84 (06-28-23 @ 08:44) (72 - 111)  BP: 135/85 (06-28-23 @ 08:44) (133/92 - 179/95)  RR: 19 (06-28-23 @ 08:44) (18 - 20)  SpO2: 98% (06-28-23 @ 08:44) (92% - 100%)  Wt(kg): --  I&O's Summary    Weight (kg): 41 (06-27 @ 15:57)    PHYSICAL EXAM:  Constitutional: Comfortable . No acute distress.   HEENT: Atraumatic and normocephalic , neck is supple . no JVD.   CNS: A&Ox 1-2 No focal deficits.   Respiratory: CTAB, unlabored   Cardiovascular: RRR normal s1 s2. No murmur  Gastrointestinal: Soft, non-tender. +Bowel sounds.   Extremities: No edema. diffuse generalized ecchymosis   Psychiatric: Calm . no agitation.   Skin: Warm and dry, no ulcers on extremities     CURRENT CARDIAC MEDICATIONS:  lisinopril 10 milliGRAM(s) Oral daily      CURRENT OTHER MEDICATIONS:  albuterol/ipratropium for Nebulization 3 milliLiter(s) Nebulizer every 6 hours PRN Shortness of Breath and/or Wheezing  buDESOnide    Inhalation Suspension 0.5 milliGRAM(s) Inhalation two times a day  acetaminophen     Tablet .. 650 milliGRAM(s) Oral every 6 hours PRN Temp greater or equal to 38C (100.4F), Mild Pain (1 - 3), Moderate Pain (4 - 6)  ondansetron Injectable 4 milliGRAM(s) IV Push every 6 hours PRN Nausea and/or Vomiting  atorvastatin 10 milliGRAM(s) Oral at bedtime  aspirin enteric coated 81 milliGRAM(s) Oral daily  enoxaparin Injectable 40 milliGRAM(s) SubCutaneous every 24 hours      LABS:	 	  ( 28 Jun 2023 07:20 )  Troponin T  <0.01,  CPK  232<H>, CKMB  X    , BNP X        , ( 27 Jun 2023 21:15 )  Troponin T  0.01 ,  CPK  X    , CKMB  X    , BNP X        , ( 27 Jun 2023 17:00 )  Troponin T  0.02 ,  CPK  X    , CKMB  X    , BNP X                                  11.5   9.22  )-----------( 278      ( 27 Jun 2023 16:08 )             34.5     06-28    144  |  106  |  14.2  ----------------------------<  101<H>  4.4   |  27.0  |  0.74    Ca    10.1      28 Jun 2023 07:20  Mg     2.0     06-28    TPro  6.1<L>  /  Alb  4.0  /  TBili  0.3<L>  /  DBili  x   /  AST  18  /  ALT  14  /  AlkPhos  75  06-27    PT/INR/PTT ( 27 Jun 2023 16:08 )                       :                       :      11.1         :       33.7                  .        .                   .              .           .       0.96        .                                       Lipid Profile: Date: 06-28 @ 07:20  Total cholesterol 206; Direct LDL: --; HDL: 82; Triglycerides:84    HgA1c:   TSH:     TELEMETRY: SR, ST  ECG:    DIAGNOSTIC TESTING:  [ ] Echocardiogram: Pending    [ ]  Catheterization:  [ ] Stress Test:    OTHER:

## 2023-06-28 NOTE — PROGRESS NOTE ADULT - PROBLEM SELECTOR PLAN 1
- New ST changes to inferior leads, which have since resolves.   - Pt with dementia, poor historian, endorses lower abd pain  - Denies chest pain, shortness of breath.   - Former smoker  - Troponins negative x 3  - Cont telemetry monitoring   - Echocardiogram pending.   - Unable to get in touch with son/family to verify medical history

## 2023-06-28 NOTE — PROGRESS NOTE ADULT - NS ATTEND AMEND GEN_ALL_CORE FT
Patient is demented and confused at bedside  Troponin are negative and pending TTE; consulted for abnormal EKG; if TTE shows normal LVEF     Unable to get in contact with family and to verify medical history Patient is demented and confused at bedside  Troponin are negative and pending TTE; consulted for abnormal EKG; if TTE shows normal LVEF with no significant valvulopathy will sign off     Unable to get in contact with family and to verify medical history    Ernestina Goodman D.O. [facility]  Cardiology/Vascular Cardiology -[facility]   Telephone # [phone]

## 2023-06-28 NOTE — BH CONSULTATION LIAISON ASSESSMENT NOTE - CURRENT MEDICATION
MEDICATIONS  (STANDING):  aspirin enteric coated 81 milliGRAM(s) Oral daily  atorvastatin 10 milliGRAM(s) Oral at bedtime  buDESOnide    Inhalation Suspension 0.5 milliGRAM(s) Inhalation two times a day  enoxaparin Injectable 40 milliGRAM(s) SubCutaneous every 24 hours  lisinopril 10 milliGRAM(s) Oral daily    MEDICATIONS  (PRN):  acetaminophen     Tablet .. 650 milliGRAM(s) Oral every 6 hours PRN Temp greater or equal to 38C (100.4F), Mild Pain (1 - 3), Moderate Pain (4 - 6)  albuterol/ipratropium for Nebulization 3 milliLiter(s) Nebulizer every 6 hours PRN Shortness of Breath and/or Wheezing  ondansetron Injectable 4 milliGRAM(s) IV Push every 6 hours PRN Nausea and/or Vomiting

## 2023-06-28 NOTE — BH CONSULTATION LIAISON ASSESSMENT NOTE - HPI (INCLUDE ILLNESS QUALITY, SEVERITY, DURATION, TIMING, CONTEXT, MODIFYING FACTORS, ASSOCIATED SIGNS AND SYMPTOMS)
Patient is a 77 year old female who is retired, living with her son and daughter in law, with no past psychiatric history but with a history of dementia who as well asa HTN and COPD who was brought in by EMS after being found wandering on the street and admitted due to an abnormal EKG. Psychiatry consulted for agitation.     Patient seen and evaluated and found to be cooperative but with an irritable tone while found to be oriented to person, partially to place but not time. Patient believes she is in [facility] and is going to work (patient did used to work in [employer] as per family) Patient stating it is [birth year]ruary of 1973 and does not know why she is in the hospital and currently denying any complaints, denies any s/h ideation, symptoms of natalya or AVH     Collateral info taken from patients son Gonzalez [phone]  and daughter in law. family explains how patient was diagnosed with dementia 6 years ago and has been progressing. She does have wandering behavior and they have set up cameras at home but with this incident, patient went out of a window which was not covered by a camera. family has concern that patients mental status has been declining and in the past several weeks, has not been sleeping, and has been more irritable.

## 2023-06-28 NOTE — BH CONSULTATION LIAISON ASSESSMENT NOTE - NSBHMSERELATED_PSY_A_CORE
Fair Per chart, pt is 75 year old female PMHx with SVT s/p ablation, HTN, HLD, hypothyroidism, vertigo, peripheral neuropathy, osteoarthritis, cervical cancer, recent admission for pubic rami fracture and MSSA bacteremia presenting with worsening abdominal pain x3 weeks and constipation x5 days s/p disimpaction found to have hypercalcemia (now corrected) and UTI (s/p Zosyn).     Pt familiar from recent admission. NKFA. No noted chewing/swallowing difficulties. Diet PTA per rehab transfer records: regular solids/thin liquids, no concentrated sweets, no added salt, DASH/TLC.   Pt continues with fair appetite/PO intake, tolerating diet. Pt requires assistance with tray set up but is able to feed self independently. Pt noted with constipation, s/p disimpaction in ED. CT showed large amount of stool in distal colon with mild edema concerning for stercoral colitis. Pt continues on bowel regimen (senna 2 tablets qHS, bisacodyl 5 mg qHS); fecal incontinence per flowsheets. Pt with limited mobility/ambulation secondary to recent pubic fracture. Additionally noted with gas pain/abdominal discomfort, ordered for simethicone PRN. Labs notable for low Mg/P, repleted.

## 2023-06-28 NOTE — BH CONSULTATION LIAISON ASSESSMENT NOTE - NSBHCHARTREVIEWVS_PSY_A_CORE FT
Vital Signs Last 24 Hrs  T(C): 36.9 (28 Jun 2023 11:23), Max: 36.9 (28 Jun 2023 11:23)  T(F): 98.4 (28 Jun 2023 11:23), Max: 98.4 (28 Jun 2023 11:23)  HR: 95 (28 Jun 2023 11:23) (72 - 95)  BP: 160/84 (28 Jun 2023 11:23) (135/85 - 179/95)  BP(mean): --  RR: 19 (28 Jun 2023 11:23) (18 - 19)  SpO2: 99% (28 Jun 2023 11:23) (94% - 100%)    Parameters below as of 28 Jun 2023 11:23  Patient On (Oxygen Delivery Method): nasal cannula  O2 Flow (L/min): 2

## 2023-06-29 LAB
ANION GAP SERPL CALC-SCNC: 14 MMOL/L — SIGNIFICANT CHANGE UP (ref 5–17)
BASOPHILS # BLD AUTO: 0.04 K/UL — SIGNIFICANT CHANGE UP (ref 0–0.2)
BASOPHILS NFR BLD AUTO: 0.4 % — SIGNIFICANT CHANGE UP (ref 0–2)
BUN SERPL-MCNC: 16.6 MG/DL — SIGNIFICANT CHANGE UP (ref 8–20)
CALCIUM SERPL-MCNC: 9.8 MG/DL — SIGNIFICANT CHANGE UP (ref 8.4–10.5)
CHLORIDE SERPL-SCNC: 104 MMOL/L — SIGNIFICANT CHANGE UP (ref 96–108)
CO2 SERPL-SCNC: 23 MMOL/L — SIGNIFICANT CHANGE UP (ref 22–29)
CREAT SERPL-MCNC: 0.81 MG/DL — SIGNIFICANT CHANGE UP (ref 0.5–1.3)
EGFR: 74 ML/MIN/1.73M2 — SIGNIFICANT CHANGE UP
EOSINOPHIL # BLD AUTO: 0.13 K/UL — SIGNIFICANT CHANGE UP (ref 0–0.5)
EOSINOPHIL NFR BLD AUTO: 1.4 % — SIGNIFICANT CHANGE UP (ref 0–6)
GLUCOSE SERPL-MCNC: 89 MG/DL — SIGNIFICANT CHANGE UP (ref 70–99)
HCT VFR BLD CALC: 34.7 % — SIGNIFICANT CHANGE UP (ref 34.5–45)
HGB BLD-MCNC: 11.5 G/DL — SIGNIFICANT CHANGE UP (ref 11.5–15.5)
IMM GRANULOCYTES NFR BLD AUTO: 0.3 % — SIGNIFICANT CHANGE UP (ref 0–0.9)
LYMPHOCYTES # BLD AUTO: 1.3 K/UL — SIGNIFICANT CHANGE UP (ref 1–3.3)
LYMPHOCYTES # BLD AUTO: 13.9 % — SIGNIFICANT CHANGE UP (ref 13–44)
MCHC RBC-ENTMCNC: 30.7 PG — SIGNIFICANT CHANGE UP (ref 27–34)
MCHC RBC-ENTMCNC: 33.1 GM/DL — SIGNIFICANT CHANGE UP (ref 32–36)
MCV RBC AUTO: 92.8 FL — SIGNIFICANT CHANGE UP (ref 80–100)
MONOCYTES # BLD AUTO: 0.73 K/UL — SIGNIFICANT CHANGE UP (ref 0–0.9)
MONOCYTES NFR BLD AUTO: 7.8 % — SIGNIFICANT CHANGE UP (ref 2–14)
NEUTROPHILS # BLD AUTO: 7.1 K/UL — SIGNIFICANT CHANGE UP (ref 1.8–7.4)
NEUTROPHILS NFR BLD AUTO: 76.2 % — SIGNIFICANT CHANGE UP (ref 43–77)
PLATELET # BLD AUTO: 290 K/UL — SIGNIFICANT CHANGE UP (ref 150–400)
POTASSIUM SERPL-MCNC: 4.6 MMOL/L — SIGNIFICANT CHANGE UP (ref 3.5–5.3)
POTASSIUM SERPL-SCNC: 4.6 MMOL/L — SIGNIFICANT CHANGE UP (ref 3.5–5.3)
RBC # BLD: 3.74 M/UL — LOW (ref 3.8–5.2)
RBC # FLD: 13.4 % — SIGNIFICANT CHANGE UP (ref 10.3–14.5)
SODIUM SERPL-SCNC: 141 MMOL/L — SIGNIFICANT CHANGE UP (ref 135–145)
WBC # BLD: 9.33 K/UL — SIGNIFICANT CHANGE UP (ref 3.8–10.5)
WBC # FLD AUTO: 9.33 K/UL — SIGNIFICANT CHANGE UP (ref 3.8–10.5)

## 2023-06-29 PROCEDURE — 99232 SBSQ HOSP IP/OBS MODERATE 35: CPT

## 2023-06-29 RX ORDER — HALOPERIDOL DECANOATE 100 MG/ML
1 INJECTION INTRAMUSCULAR ONCE
Refills: 0 | Status: COMPLETED | OUTPATIENT
Start: 2023-06-29 | End: 2023-06-29

## 2023-06-29 RX ADMIN — ATORVASTATIN CALCIUM 10 MILLIGRAM(S): 80 TABLET, FILM COATED ORAL at 22:04

## 2023-06-29 RX ADMIN — Medication 3 MILLILITER(S): at 20:50

## 2023-06-29 RX ADMIN — ENOXAPARIN SODIUM 40 MILLIGRAM(S): 100 INJECTION SUBCUTANEOUS at 05:06

## 2023-06-29 RX ADMIN — Medication 81 MILLIGRAM(S): at 13:09

## 2023-06-29 RX ADMIN — Medication 3 MILLILITER(S): at 09:39

## 2023-06-29 RX ADMIN — Medication 0.5 MILLIGRAM(S): at 09:39

## 2023-06-29 RX ADMIN — HALOPERIDOL DECANOATE 1 MILLIGRAM(S): 100 INJECTION INTRAMUSCULAR at 00:20

## 2023-06-29 RX ADMIN — Medication 50 MILLIGRAM(S): at 22:03

## 2023-06-29 RX ADMIN — Medication 0.5 MILLIGRAM(S): at 20:50

## 2023-06-29 RX ADMIN — OLANZAPINE 2.5 MILLIGRAM(S): 15 TABLET, FILM COATED ORAL at 05:07

## 2023-06-29 RX ADMIN — LISINOPRIL 10 MILLIGRAM(S): 2.5 TABLET ORAL at 05:08

## 2023-06-29 NOTE — BH CONSULTATION LIAISON PROGRESS NOTE - CURRENT MEDICATION
MEDICATIONS  (STANDING):  aspirin enteric coated 81 milliGRAM(s) Oral daily  atorvastatin 10 milliGRAM(s) Oral at bedtime  buDESOnide    Inhalation Suspension 0.5 milliGRAM(s) Inhalation two times a day  enoxaparin Injectable 40 milliGRAM(s) SubCutaneous every 24 hours  lisinopril 10 milliGRAM(s) Oral daily  traZODone 50 milliGRAM(s) Oral at bedtime    MEDICATIONS  (PRN):  acetaminophen     Tablet .. 650 milliGRAM(s) Oral every 6 hours PRN Temp greater or equal to 38C (100.4F), Mild Pain (1 - 3), Moderate Pain (4 - 6)  albuterol/ipratropium for Nebulization 3 milliLiter(s) Nebulizer every 6 hours PRN Shortness of Breath and/or Wheezing  OLANZapine Injectable 2.5 milliGRAM(s) IntraMuscular every 6 hours PRN Agitation  ondansetron Injectable 4 milliGRAM(s) IV Push every 6 hours PRN Nausea and/or Vomiting

## 2023-06-29 NOTE — PROGRESS NOTE ADULT - ASSESSMENT
77yoF hx dementia, HTN, COPD who was brought in by EMS after she was found wandering on the street she [place] and was found to have showed dynamic ECG changes with new TWI in inferior leads that was not seen on initial ECG    Abnormal ECG   - Telemetry monitoring  - Multiple EKGs with dynamic ST-T changes in inferior leads, anterior-lateral leads  - Troponin negative x 3  - Cardiology recs appreciated  - Aspirin 81mg daily  - Lipitor 10mg nightly   - TTE pending  - Further Cardiology plan based on TTE    Hx dementia with behavioral disturbance  - CT head w/out acute abnormality  - Episodes of attempting to get out of bed, removing monitors  - Received IM Haldol 2.5mg and Seroquel 25mg by ED  - Psychiatry consult appreciated  - Trazodone 50mg nightly     Hx HTN  - Interchange benazepril 10mg with lisinopril 10mg daily     Hx HLD  - Atorvastatin 10mg daily    Hx COPD  - Pulmicort and DuoNeb PRN    DVT ppx  -Lovenox 40mg daily

## 2023-06-29 NOTE — BH CONSULTATION LIAISON PROGRESS NOTE - NSBHCHARTREVIEWVS_PSY_A_CORE FT
Vital Signs Last 24 Hrs  T(C): 37.3 (29 Jun 2023 17:27), Max: 37.3 (29 Jun 2023 17:27)  T(F): 99.2 (29 Jun 2023 17:27), Max: 99.2 (29 Jun 2023 17:27)  HR: 88 (29 Jun 2023 17:27) (87 - 106)  BP: 124/85 (29 Jun 2023 17:27) (102/71 - 154/87)  BP(mean): --  RR: 18 (29 Jun 2023 17:27) (16 - 18)  SpO2: 93% (29 Jun 2023 17:27) (93% - 100%)    Parameters below as of 29 Jun 2023 17:27  Patient On (Oxygen Delivery Method): room air

## 2023-06-29 NOTE — BH CONSULTATION LIAISON PROGRESS NOTE - NSBHASSESSMENTFT_PSY_ALL_CORE
Patient is a 77 year old female who is retired, living with her son and daughter in law, with no past psychiatric history but with a history of dementia who as well asa HTN and COPD who was brought in by EMS after being found wandering on the street and admitted due to an abnormal EKG. Psychiatry consulted for agitation.     Patient seen and evaluated and found to be sleeping s/p PRN overnight of ZYprexa 2.5 and Haldol

## 2023-06-29 NOTE — PROGRESS NOTE ADULT - SUBJECTIVE AND OBJECTIVE BOX
Chief complaint: Dementia    Patient seen and examined at bedside. No acute overnight events reported. No fever, chills, cough, chest pain, nausea or vomiting.     Vital Signs Last 24 Hrs  T(F): 98 (29 Jun 2023 08:07), Max: 98.4 (28 Jun 2023 19:11)  HR: 96 (29 Jun 2023 08:07) (87 - 106)  BP: 130/80 (29 Jun 2023 08:07) (130/80 - 154/87)  RR: 16 (29 Jun 2023 08:07) (16 - 18)  SpO2: 94% (29 Jun 2023 08:07) (94% - 100%)    Physical Exam:  Constitutional: alert and oriented, in no acute distress   Neck: Soft and supple  Respiratory: Good air entry b/l  Cardiovascular: Regular rate and rhythm  Gastrointestinal: Soft, non-tender to palpation, +bs  Vascular: 2+ peripheral pulses  Neurological: A/O x 3  Musculoskeletal: 5/5 strength b/l upper and lower extremities, no lower extremity edema bilaterally    Labs:                        11.5   9.33  )-----------( 290      ( 29 Jun 2023 03:15 )             34.7   06-29    141  |  104  |  16.6  ----------------------------<  89  4.6   |  23.0  |  0.81    Ca    9.8      29 Jun 2023 03:15  Mg     2.0     06-28    TPro  6.1<L>  /  Alb  4.0  /  TBili  0.3<L>  /  DBili  x   /  AST  18  /  ALT  14  /  AlkPhos  75  06-27

## 2023-06-29 NOTE — BH CONSULTATION LIAISON PROGRESS NOTE - NSBHCHARTREVIEWINVESTIGATE_PSY_A_CORE FT
< from: 12 Lead ECG (06.28.23 @ 08:14) >      Ventricular Rate 72 BPM    Atrial Rate 72 BPM    P-R Interval 102 ms    QRS Duration 84 ms    Q-T Interval 432 ms    QTC Calculation(Bazett) 473 ms    P Axis 85 degrees    R Axis 72 degrees    T Axis 126 degrees    Diagnosis Line Sinus rhythm with short PA  Voltage criteria for left ventricular hypertrophy  ST & Marked T wave abnormality, consider anterolateral ischemia  Prolonged QT  Abnormal ECG    Confirmed by Feliz Flowers ([id number]) on 6/28/2023 10:16:32 AM    < end of copied text >

## 2023-06-29 NOTE — BH CONSULTATION LIAISON PROGRESS NOTE - NSBHFUPINTERVALHXFT_PSY_A_CORE
Patient is a 77 year old female who is retired, living with her son and daughter in law, with no past psychiatric history but with a history of dementia who as well asa HTN and COPD who was brought in by EMS after being found wandering on the street and admitted due to an abnormal EKG. Psychiatry consulted for agitation.     Patient seen and evaluated, with baseline dementia with worsening confusion and irritability     Recs:   -Maintain delirium precautions   -Avoid anticholinergic agents, benzos, opioid as they can further perpetuate confusion   -Frequent re orientation, Hydration, try to avoid restraints and if possible, mobilize patient and PT involvement  -r/o any potential medical pathology that may be worsening her mental status.   -Recommend to check B12, folate, Vit D   -EKG done and Qt within range  -Continue Trazodone 50mg pO at bedtime for sleep   -For any acute agitation, recommend Zyprexa 2.5mg IM Q6 hours PRN  -If agitation persists, will consider starting standing antipsychotic

## 2023-06-29 NOTE — CHART NOTE - NSCHARTNOTEFT_GEN_A_CORE
Pt with episode acute agitation/delirium.   Attempting to get OOB, pulling at lines, restless.  All vital signs stable.   Received PRN zyprexa with poor effect.   1mg IM haldol given.   RN to notify of acute change in pt status.

## 2023-06-29 NOTE — BH CONSULTATION LIAISON PROGRESS NOTE - NSBHMSEBEHAV_PSY_A_CORE
Telephone Encounter by Sinai Ritter RN at 02/19/18 01:39 PM     Author:  Sinai Ritter RN Service:  (none) Author Type:  Registered Nurse     Filed:  02/19/18 01:39 PM Encounter Date:  2/13/2018 Status:  Signed     :  Sinai Ritter RN (Registered Nurse)            Appt scheduled for 4/20/2018 Dr Vazquez[ST1.1M]      Revision History        User Key Date/Time User Provider Type Action    > ST1.1 02/19/18 01:39 PM Sinai Ritter RN Registered Nurse Sign    M - Manual             Cooperative

## 2023-06-29 NOTE — BH CONSULTATION LIAISON PROGRESS NOTE - NSBHCHARTREVIEWLAB_PSY_A_CORE FT
Basic Metabolic Panel (06.28.23 @ 07:20)    Sodium: 144 mmol/L    Potassium: 4.4 mmol/L    Chloride: 106: Chloride reference range changed from ..10/26/2022 mmol/L    Carbon Dioxide: 27.0 mmol/L    Anion Gap: 11 mmol/L    Blood Urea Nitrogen: 14.2 mg/dL    Creatinine: 0.74 mg/dL    Glucose: 101 mg/dL    Calcium: 10.1 mg/dL    eGFR: 82: The estimated glomerular filtration rate (eGFR) is calculated using the  2021 CKD-EPI creatinine equation, which does not have a coefficient for  race and is validated in individuals 18 years of age and older (N Engl J  Med 2021; 385:7855-6457). Creatinine-based eGFR may be inaccurate in  various situations including but not limited to extremes of muscle mass,  altered dietary protein intake, or medications that affect renal tubular  creatinine secretion. mL/min/1.73m2

## 2023-06-30 LAB
ANION GAP SERPL CALC-SCNC: 12 MMOL/L — SIGNIFICANT CHANGE UP (ref 5–17)
ANION GAP SERPL CALC-SCNC: 12 MMOL/L — SIGNIFICANT CHANGE UP (ref 5–17)
BASOPHILS # BLD AUTO: 0.05 K/UL — SIGNIFICANT CHANGE UP (ref 0–0.2)
BASOPHILS NFR BLD AUTO: 0.6 % — SIGNIFICANT CHANGE UP (ref 0–2)
BLD GP AB SCN SERPL QL: SIGNIFICANT CHANGE UP
BUN SERPL-MCNC: 24.9 MG/DL — HIGH (ref 8–20)
BUN SERPL-MCNC: 32.3 MG/DL — HIGH (ref 8–20)
CALCIUM SERPL-MCNC: 9.6 MG/DL — SIGNIFICANT CHANGE UP (ref 8.4–10.5)
CALCIUM SERPL-MCNC: 9.9 MG/DL — SIGNIFICANT CHANGE UP (ref 8.4–10.5)
CHLORIDE SERPL-SCNC: 103 MMOL/L — SIGNIFICANT CHANGE UP (ref 96–108)
CHLORIDE SERPL-SCNC: 105 MMOL/L — SIGNIFICANT CHANGE UP (ref 96–108)
CO2 SERPL-SCNC: 24 MMOL/L — SIGNIFICANT CHANGE UP (ref 22–29)
CO2 SERPL-SCNC: 25 MMOL/L — SIGNIFICANT CHANGE UP (ref 22–29)
CREAT SERPL-MCNC: 0.98 MG/DL — SIGNIFICANT CHANGE UP (ref 0.5–1.3)
CREAT SERPL-MCNC: 1.4 MG/DL — HIGH (ref 0.5–1.3)
EGFR: 38 ML/MIN/1.73M2 — LOW
EGFR: 59 ML/MIN/1.73M2 — LOW
EOSINOPHIL # BLD AUTO: 0.17 K/UL — SIGNIFICANT CHANGE UP (ref 0–0.5)
EOSINOPHIL NFR BLD AUTO: 1.9 % — SIGNIFICANT CHANGE UP (ref 0–6)
GLUCOSE SERPL-MCNC: 79 MG/DL — SIGNIFICANT CHANGE UP (ref 70–99)
GLUCOSE SERPL-MCNC: 89 MG/DL — SIGNIFICANT CHANGE UP (ref 70–99)
HCT VFR BLD CALC: 34.9 % — SIGNIFICANT CHANGE UP (ref 34.5–45)
HCT VFR BLD CALC: 35.6 % — SIGNIFICANT CHANGE UP (ref 34.5–45)
HGB BLD-MCNC: 11.4 G/DL — LOW (ref 11.5–15.5)
HGB BLD-MCNC: 11.8 G/DL — SIGNIFICANT CHANGE UP (ref 11.5–15.5)
IMM GRANULOCYTES NFR BLD AUTO: 0.3 % — SIGNIFICANT CHANGE UP (ref 0–0.9)
LYMPHOCYTES # BLD AUTO: 0.95 K/UL — LOW (ref 1–3.3)
LYMPHOCYTES # BLD AUTO: 10.7 % — LOW (ref 13–44)
MAGNESIUM SERPL-MCNC: 2 MG/DL — SIGNIFICANT CHANGE UP (ref 1.6–2.6)
MAGNESIUM SERPL-MCNC: 2.2 MG/DL — SIGNIFICANT CHANGE UP (ref 1.6–2.6)
MCHC RBC-ENTMCNC: 30.3 PG — SIGNIFICANT CHANGE UP (ref 27–34)
MCHC RBC-ENTMCNC: 32.7 GM/DL — SIGNIFICANT CHANGE UP (ref 32–36)
MCV RBC AUTO: 92.8 FL — SIGNIFICANT CHANGE UP (ref 80–100)
MONOCYTES # BLD AUTO: 0.66 K/UL — SIGNIFICANT CHANGE UP (ref 0–0.9)
MONOCYTES NFR BLD AUTO: 7.5 % — SIGNIFICANT CHANGE UP (ref 2–14)
NEUTROPHILS # BLD AUTO: 6.99 K/UL — SIGNIFICANT CHANGE UP (ref 1.8–7.4)
NEUTROPHILS NFR BLD AUTO: 79 % — HIGH (ref 43–77)
PHOSPHATE SERPL-MCNC: 3.6 MG/DL — SIGNIFICANT CHANGE UP (ref 2.4–4.7)
PLATELET # BLD AUTO: 271 K/UL — SIGNIFICANT CHANGE UP (ref 150–400)
POTASSIUM SERPL-MCNC: 4.1 MMOL/L — SIGNIFICANT CHANGE UP (ref 3.5–5.3)
POTASSIUM SERPL-MCNC: 4.6 MMOL/L — SIGNIFICANT CHANGE UP (ref 3.5–5.3)
POTASSIUM SERPL-SCNC: 4.1 MMOL/L — SIGNIFICANT CHANGE UP (ref 3.5–5.3)
POTASSIUM SERPL-SCNC: 4.6 MMOL/L — SIGNIFICANT CHANGE UP (ref 3.5–5.3)
RBC # BLD: 3.76 M/UL — LOW (ref 3.8–5.2)
RBC # FLD: 13.7 % — SIGNIFICANT CHANGE UP (ref 10.3–14.5)
SODIUM SERPL-SCNC: 138 MMOL/L — SIGNIFICANT CHANGE UP (ref 135–145)
SODIUM SERPL-SCNC: 142 MMOL/L — SIGNIFICANT CHANGE UP (ref 135–145)
WBC # BLD: 8.85 K/UL — SIGNIFICANT CHANGE UP (ref 3.8–10.5)
WBC # FLD AUTO: 8.85 K/UL — SIGNIFICANT CHANGE UP (ref 3.8–10.5)

## 2023-06-30 PROCEDURE — 76882 US LMTD JT/FCL EVL NVASC XTR: CPT | Mod: 26,RT

## 2023-06-30 PROCEDURE — 93010 ELECTROCARDIOGRAM REPORT: CPT | Mod: 76

## 2023-06-30 PROCEDURE — 99232 SBSQ HOSP IP/OBS MODERATE 35: CPT

## 2023-06-30 RX ORDER — SODIUM CHLORIDE 9 MG/ML
500 INJECTION INTRAMUSCULAR; INTRAVENOUS; SUBCUTANEOUS ONCE
Refills: 0 | Status: COMPLETED | OUTPATIENT
Start: 2023-06-30 | End: 2023-06-30

## 2023-06-30 RX ADMIN — Medication 0.5 MILLIGRAM(S): at 08:26

## 2023-06-30 RX ADMIN — Medication 81 MILLIGRAM(S): at 12:06

## 2023-06-30 RX ADMIN — SODIUM CHLORIDE 500 MILLILITER(S): 9 INJECTION INTRAMUSCULAR; INTRAVENOUS; SUBCUTANEOUS at 18:58

## 2023-06-30 RX ADMIN — ENOXAPARIN SODIUM 40 MILLIGRAM(S): 100 INJECTION SUBCUTANEOUS at 05:48

## 2023-06-30 RX ADMIN — OLANZAPINE 2.5 MILLIGRAM(S): 15 TABLET, FILM COATED ORAL at 01:02

## 2023-06-30 RX ADMIN — ATORVASTATIN CALCIUM 10 MILLIGRAM(S): 80 TABLET, FILM COATED ORAL at 21:32

## 2023-06-30 RX ADMIN — OLANZAPINE 2.5 MILLIGRAM(S): 15 TABLET, FILM COATED ORAL at 21:48

## 2023-06-30 RX ADMIN — Medication 50 MILLIGRAM(S): at 21:32

## 2023-06-30 RX ADMIN — LISINOPRIL 10 MILLIGRAM(S): 2.5 TABLET ORAL at 05:47

## 2023-06-30 NOTE — PROGRESS NOTE ADULT - SUBJECTIVE AND OBJECTIVE BOX
MIYA WILKINS    [id number]    77y      Female    CC: dementia     INTERVAL HPI/OVERNIGHT EVENTS:     REVIEW OF SYSTEMS:    CONSTITUTIONAL: No fever, weight loss, or fatigue  RESPIRATORY: No cough, wheezing, hemoptysis; No shortness of breath  CARDIOVASCULAR: No chest pain, palpitations  GASTROINTESTINAL: No abdominal or epigastric pain. No nausea, vomiting  NEUROLOGICAL: No headaches, memory loss, loss of strength.    Vital Signs Last 24 Hrs  T(C): 36.7 (30 Jun 2023 10:46), Max: 37.3 (29 Jun 2023 17:27)  T(F): 98.1 (30 Jun 2023 10:46), Max: 99.2 (29 Jun 2023 17:27)  HR: 86 (30 Jun 2023 10:46) (86 - 92)  BP: 119/73 (30 Jun 2023 10:46) (102/71 - 124/85)  BP(mean): --  RR: 18 (30 Jun 2023 10:46) (18 - 18)  SpO2: 94% (30 Jun 2023 10:46) (93% - 95%)    Parameters below as of 30 Jun 2023 10:46  Patient On (Oxygen Delivery Method): room air        PHYSICAL EXAM:    GENERAL: NAD  HEENT: PERRL, +EOMI  NECK: soft, supple  CHEST/LUNG: Clear to auscultation bilaterally; No wheezing  HEART: S1S2+, Regular rate and rhythm; No murmurs, rubs, or gallops  ABDOMEN: Soft, Nontender, Nondistended; Bowel sounds present  SKIN: No rashes or lesions  NEURO: AAOX3, no focal deficits, no motor or sensory loss  PSYCH: normal mood    LABS:                        11.4   8.85  )-----------( 271      ( 30 Jun 2023 06:37 )             34.9     06-30    138  |  103  |  24.9<H>  ----------------------------<  79  4.1   |  24.0  |  0.98    Ca    9.9      30 Jun 2023 06:37  Mg     2.0     06-30        Urinalysis Basic - ( 30 Jun 2023 06:37 )    Color: x / Appearance: x / SG: x / pH: x  Gluc: 79 mg/dL / Ketone: x  / Bili: x / Urobili: x   Blood: x / Protein: x / Nitrite: x   Leuk Esterase: x / RBC: x / WBC x   Sq Epi: x / Non Sq Epi: x / Bacteria: x          MEDICATIONS  (STANDING):  aspirin enteric coated 81 milliGRAM(s) Oral daily  atorvastatin 10 milliGRAM(s) Oral at bedtime  buDESOnide    Inhalation Suspension 0.5 milliGRAM(s) Inhalation two times a day  enoxaparin Injectable 40 milliGRAM(s) SubCutaneous every 24 hours  lisinopril 10 milliGRAM(s) Oral daily  traZODone 50 milliGRAM(s) Oral at bedtime    MEDICATIONS  (PRN):  acetaminophen     Tablet .. 650 milliGRAM(s) Oral every 6 hours PRN Temp greater or equal to 38C (100.4F), Mild Pain (1 - 3), Moderate Pain (4 - 6)  albuterol/ipratropium for Nebulization 3 milliLiter(s) Nebulizer every 6 hours PRN Shortness of Breath and/or Wheezing  OLANZapine Injectable 2.5 milliGRAM(s) IntraMuscular every 6 hours PRN Agitation  ondansetron Injectable 4 milliGRAM(s) IV Push every 6 hours PRN Nausea and/or Vomiting      RADIOLOGY & ADDITIONAL TESTS:   MIYA WILKINS    [id number]    77y      Female    CC: dementia     INTERVAL HPI/OVERNIGHT EVENTS: pt seen and examined. aide at bedside     REVIEW OF SYSTEMS:  unable to obtain due to mental status     Vital Signs Last 24 Hrs  T(C): 36.7 (30 Jun 2023 10:46), Max: 37.3 (29 Jun 2023 17:27)  T(F): 98.1 (30 Jun 2023 10:46), Max: 99.2 (29 Jun 2023 17:27)  HR: 86 (30 Jun 2023 10:46) (86 - 92)  BP: 119/73 (30 Jun 2023 10:46) (102/71 - 124/85)  BP(mean): --  RR: 18 (30 Jun 2023 10:46) (18 - 18)  SpO2: 94% (30 Jun 2023 10:46) (93% - 95%)    Parameters below as of 30 Jun 2023 10:46  Patient On (Oxygen Delivery Method): room air        PHYSICAL EXAM:    GENERAL: NAD  HEENT: PERRL  NECK: soft, supple  CHEST/LUNG: Clear to auscultation bilaterally  HEART: S1S2+, Regular rate and rhythm  ABDOMEN: Soft, Nontender, Nondistended; Bowel sounds present  SKIN: warm, dry  NEURO: Awake, alert; confused   PSYCH: calm at time of exam     LABS:                        11.4   8.85  )-----------( 271      ( 30 Jun 2023 06:37 )             34.9     06-30    138  |  103  |  24.9<H>  ----------------------------<  79  4.1   |  24.0  |  0.98    Ca    9.9      30 Jun 2023 06:37  Mg     2.0     06-30        Urinalysis Basic - ( 30 Jun 2023 06:37 )    Color: x / Appearance: x / SG: x / pH: x  Gluc: 79 mg/dL / Ketone: x  / Bili: x / Urobili: x   Blood: x / Protein: x / Nitrite: x   Leuk Esterase: x / RBC: x / WBC x   Sq Epi: x / Non Sq Epi: x / Bacteria: x          MEDICATIONS  (STANDING):  aspirin enteric coated 81 milliGRAM(s) Oral daily  atorvastatin 10 milliGRAM(s) Oral at bedtime  buDESOnide    Inhalation Suspension 0.5 milliGRAM(s) Inhalation two times a day  enoxaparin Injectable 40 milliGRAM(s) SubCutaneous every 24 hours  lisinopril 10 milliGRAM(s) Oral daily  traZODone 50 milliGRAM(s) Oral at bedtime    MEDICATIONS  (PRN):  acetaminophen     Tablet .. 650 milliGRAM(s) Oral every 6 hours PRN Temp greater or equal to 38C (100.4F), Mild Pain (1 - 3), Moderate Pain (4 - 6)  albuterol/ipratropium for Nebulization 3 milliLiter(s) Nebulizer every 6 hours PRN Shortness of Breath and/or Wheezing  OLANZapine Injectable 2.5 milliGRAM(s) IntraMuscular every 6 hours PRN Agitation  ondansetron Injectable 4 milliGRAM(s) IV Push every 6 hours PRN Nausea and/or Vomiting      RADIOLOGY & ADDITIONAL TESTS:

## 2023-06-30 NOTE — DIETITIAN INITIAL EVALUATION ADULT - PERTINENT LABORATORY DATA
06-30    138  |  103  |  24.9<H>  ----------------------------<  79  4.1   |  24.0  |  0.98    Ca    9.9      30 Jun 2023 06:37  Mg     2.0     06-30    A1C with Estimated Average Glucose Result: 5.4 % (06-28-23 @ 07:20)

## 2023-06-30 NOTE — DIETITIAN INITIAL EVALUATION ADULT - ADD RECOMMEND
Consider soft diet 2/2 edentulous status; Add Ensure HP/Enlive TID; Rx MVI and vit C 500mg daily; Encourage HBV protein sources; Provide encouragement/assistance as needed during mealtimes to inc PO

## 2023-06-30 NOTE — DIETITIAN INITIAL EVALUATION ADULT - OTHER INFO
77yoF hx dementia, HTN, COPD who was brought in by EMS after she was found wandering on the [address] she [place].  Pt awake, alert and conversant but is unreliable historian who told EMS that she climbed out of the window and told me during interview that she lives with her 21 year old mother and just came from work.  While in ED, multiple ECGs were performed that showed dynamic ECG changes with new TWI in inferior leads that was not seen on initial ECG prompting admission to medicine.  Pt does not report any active symptoms during interview.

## 2023-06-30 NOTE — DIETITIAN INITIAL EVALUATION ADULT - ETIOLOGY
related to inability to meet sufficient protein-energy needs in setting of COPD, dementia with behavioral disturbance

## 2023-06-30 NOTE — PROGRESS NOTE ADULT - ASSESSMENT
77y/oF PMH dementia, HTN, COPD, BIBEMS after found wandering the street she lives on and was found to have dynamic EKG changes with new TWI in inferior leads not seen on EKG, admitted for further w/u     abnormal ekg   -trops neg x3   -cardio recs appreciated   -cont asa, lipitor   -TTE: LVEF 60-65%, grade I ddx, trace mvr, mod aortic root calcification     Hx dementia with behavioral disturbance   -cth neg for acute finding   -psych recs appreciated   -cont trazodone 50mg qhs   -prn IM zyprexa q6h  77y/oF PMH dementia, HTN, COPD, BIBEMS after found wandering the street she lives on and was found to have dynamic EKG changes with new TWI in inferior leads not seen on EKG, admitted for further w/u     abnormal ekg   -trops neg x3   -cardio recs appreciated   -cont asa, lipitor   -TTE: LVEF 60-65%, grade I ddx, trace mvr, mod aortic root calcification     Hx dementia with behavioral disturbance   -cth neg for acute finding   -psych recs appreciated   -cont trazodone 50mg qhs   -prn IM zyprexa q6h 2.5mg for acute agitation     HTN, hLD   -lisinopril in house (for home benazepril), cont atorvastatin     COPD   -not in acute exacerbation  -cont pulmicort, duoneb     vte ppx: lovenox sq

## 2023-06-30 NOTE — CHART NOTE - NSCHARTNOTEFT_GEN_A_CORE
Patient with 4.5 secs of SVT, patient now back to baseline rhythm  Patient seen at bedside, patient confused at baseline unable to obtained 2/2 to mentation   Patient appears comfortable, sitting in chair     Vital Signs   T(F): 98.6  HR: 102  BP: 80/60 MAP 67   RR: 16  SpO2: 96% RA    GENERAL: NAD, lying comfortably  NERVOUS SYSTEM: Alert and awake, confused  CHEST/LUNG: Clear to auscultation b/l; Unlabored respirations  HEART: S1S2+, Regular rate and rhythm  ABDOMEN: Soft, Nontender, Nondistended; BS+   EXTREMITIES:  2+ Peripheral Pulses, No LE edema, no tenderness to palpation of b/l LE. +hematoma on UE right arm.   SKIN: Warm and dry    STAT EKG  US pending for right UE arm hematoma   T&S collected, H&H at 17:55 was stable at 11.8/35.6   STAT Mag, Phos, and BMP pending   500 cc bolus x1 for soft BP   Will repeat CBC at 00:00   RN to notify with any acute changes Patient with 4.5 secs of SVT, patient now back to baseline rhythm  Patient seen at bedside, patient confused at baseline unable to obtained 2/2 to mentation   Patient appears comfortable, sitting in chair     Vital Signs   T(F): 98.6  HR: 102  BP: 80/60 MAP 67   RR: 16  SpO2: 96% RA    GENERAL: NAD, lying comfortably  NERVOUS SYSTEM: Alert and awake, confused  CHEST/LUNG: Clear to auscultation b/l; Unlabored respirations  HEART: S1S2+, Regular rate and rhythm  ABDOMEN: Soft, Nontender, Nondistended; BS+   EXTREMITIES:  2+ Peripheral Pulses, No LE edema, no tenderness to palpation of b/l LE. +hematoma on UE right arm.   SKIN: Warm and dry    STAT EKG without any new changes compared to previous EKG, HR 94   US pending for right UE arm hematoma   T&S collected, H&H at 17:55 was stable at 11.8/35.6   STAT Mag, Phos, and BMP pending   500 cc bolus x1 for soft BP   Will repeat CBC at 00:00   RN to notify with any acute changes    Addendum 20:00   Labs reviewed, no electrolyte abnormalities  Repeat /76 HR 92 Patient with 4.5 secs of SVT, patient now back to baseline rhythm  Patient seen at bedside, patient confused at baseline unable to obtained 2/2 to mentation   Patient appears comfortable, sitting in chair     Vital Signs   T(F): 98.6  HR: 102  BP: 80/60 MAP 67   RR: 16  SpO2: 96% RA    GENERAL: NAD, lying comfortably  NERVOUS SYSTEM: Alert and awake, confused  CHEST/LUNG: Clear to auscultation b/l; Unlabored respirations  HEART: S1S2+, Regular rate and rhythm  ABDOMEN: Soft, Nontender, Nondistended; BS+   EXTREMITIES:  2+ Peripheral Pulses, No LE edema, no tenderness to palpation of b/l LE. +hematoma on UE right arm.   SKIN: Warm and dry    STAT EKG with no new changes compared to previous EKG, HR 94   US pending for right UE arm hematoma   T&S collected, H&H at 17:55 was stable at 11.8/35.6   STAT Mag, Phos, and BMP pending   500 cc bolus x1 for soft BP   Will repeat CBC at 00:00   RN to notify with any acute changes    Addendum 20:00   Labs reviewed, no electrolyte abnormalities  Repeat /76 HR 92

## 2023-06-30 NOTE — PATIENT PROFILE ADULT - FALL HARM RISK - HARM RISK INTERVENTIONS
Assistance with ambulation/Assistance OOB with selected safe patient handling equipment/Communicate Risk of Fall with Harm to all staff/Monitor for mental status changes/Move patient closer to nurses' station/Reinforce activity limits and safety measures with patient and family/Reorient to person, place and time as needed/Tailored Fall Risk Interventions/Toileting schedule using arm’s reach rule for commode and bathroom/Use of alarms - bed, chair and/or voice tab/Visual Cue: Yellow wristband and red socks/Bed in lowest position, wheels locked, appropriate side rails in place/Call bell, personal items and telephone in reach/Instruct patient to call for assistance before getting out of bed or chair/Non-slip footwear when patient is out of bed/[place] to call system/Physically safe environment - no spills, clutter or unnecessary equipment/Purposeful Proactive Rounding/Room/bathroom lighting operational, light cord in reach

## 2023-06-30 NOTE — DIETITIAN INITIAL EVALUATION ADULT - ORAL INTAKE PTA/DIET HISTORY
Pt unable to complete interview 2/2 dementia, per documentation completing <50% of meals with assistance. Edentulous status noted.  Pt unable to complete interview 2/2 dementia, hallucinating at this time. Per documentation completing <50% of meals with assistance. Edentulous status noted.

## 2023-06-30 NOTE — CHART NOTE - NSCHARTNOTEFT_GEN_A_CORE
Contacted by RN for hematoma RUE.  Patient seen and examined sitting in bedside chair. Patient AxOxO, unreliable historian.     Vital Signs  T(C): 36.7  HR: 97   BP: 94/69   RR: 16   SpO2: 96%     Physical Exam:  Gen: awake, alert, confused  Ext: RUE with substantial hematoma + edema over inner arm, no evidence of laceration, puncture or scab in this region, tender to light touch, no erythema     A/P: RUE hematoma  STAT H+H  US RUE urgent  advised RN to demarcate borders of hematoma to observe for expansion  continue to monitor BP, patient asymptomatic but unreliable historian.  signed out to night team to continue to monitor  RN to monitor, assess and escalate to PA PRN.  Will continue to monitor. Contacted by RN for hematoma RUE @ 17:15 (time stamp incorrectly inputted)   Patient seen and examined sitting in bedside chair. Patient AxOxO, unreliable historian.     Vital Signs  T(C): 36.7  HR: 97   BP: 94/69   RR: 16   SpO2: 96%     Physical Exam:  Gen: awake, alert, confused  Ext: RUE with substantial hematoma + edema over inner arm, no evidence of laceration, puncture or scab in this region, tender to light touch, no erythema     A/P: RUE hematoma  STAT H+H  US RUE urgent  advised RN to demarcate borders of hematoma to observe for expansion  continue to monitor BP, patient asymptomatic but unreliable historian.  signed out to night team to continue to monitor  RN to monitor, assess and escalate to PA PRN.  Will continue to monitor.

## 2023-06-30 NOTE — DIETITIAN NUTRITION RISK NOTIFICATION - ADDITIONAL COMMENTS/DIETITIAN RECOMMENDATIONS
1) Consider soft diet 2/2 edentulous status  2) Add Ensure HP/Enlive TID  3) Rx MVI and vit C 500mg daily  4) Encourage HBV protein sources  5) Provide encouragement/assistance as needed during mealtimes to inc PO  6) Monitor weights daily for trend/accuracy

## 2023-07-01 ENCOUNTER — TRANSCRIPTION ENCOUNTER (OUTPATIENT)
Age: 78
End: 2023-07-01

## 2023-07-01 LAB
ANION GAP SERPL CALC-SCNC: 12 MMOL/L — SIGNIFICANT CHANGE UP (ref 5–17)
BUN SERPL-MCNC: 31.6 MG/DL — HIGH (ref 8–20)
CALCIUM SERPL-MCNC: 9.4 MG/DL — SIGNIFICANT CHANGE UP (ref 8.4–10.5)
CHLORIDE SERPL-SCNC: 103 MMOL/L — SIGNIFICANT CHANGE UP (ref 96–108)
CO2 SERPL-SCNC: 22 MMOL/L — SIGNIFICANT CHANGE UP (ref 22–29)
CREAT SERPL-MCNC: 1.06 MG/DL — SIGNIFICANT CHANGE UP (ref 0.5–1.3)
EGFR: 53 ML/MIN/1.73M2 — LOW
GLUCOSE SERPL-MCNC: 74 MG/DL — SIGNIFICANT CHANGE UP (ref 70–99)
HCT VFR BLD CALC: 32.2 % — LOW (ref 34.5–45)
HCT VFR BLD CALC: 33.2 % — LOW (ref 34.5–45)
HGB BLD-MCNC: 10.7 G/DL — LOW (ref 11.5–15.5)
HGB BLD-MCNC: 10.8 G/DL — LOW (ref 11.5–15.5)
MAGNESIUM SERPL-MCNC: 2 MG/DL — SIGNIFICANT CHANGE UP (ref 1.6–2.6)
MCHC RBC-ENTMCNC: 30.7 PG — SIGNIFICANT CHANGE UP (ref 27–34)
MCHC RBC-ENTMCNC: 31 PG — SIGNIFICANT CHANGE UP (ref 27–34)
MCHC RBC-ENTMCNC: 32.5 GM/DL — SIGNIFICANT CHANGE UP (ref 32–36)
MCHC RBC-ENTMCNC: 33.2 GM/DL — SIGNIFICANT CHANGE UP (ref 32–36)
MCV RBC AUTO: 93.3 FL — SIGNIFICANT CHANGE UP (ref 80–100)
MCV RBC AUTO: 94.3 FL — SIGNIFICANT CHANGE UP (ref 80–100)
PLATELET # BLD AUTO: 281 K/UL — SIGNIFICANT CHANGE UP (ref 150–400)
PLATELET # BLD AUTO: 295 K/UL — SIGNIFICANT CHANGE UP (ref 150–400)
POTASSIUM SERPL-MCNC: 4.1 MMOL/L — SIGNIFICANT CHANGE UP (ref 3.5–5.3)
POTASSIUM SERPL-SCNC: 4.1 MMOL/L — SIGNIFICANT CHANGE UP (ref 3.5–5.3)
RBC # BLD: 3.45 M/UL — LOW (ref 3.8–5.2)
RBC # BLD: 3.52 M/UL — LOW (ref 3.8–5.2)
RBC # FLD: 13.5 % — SIGNIFICANT CHANGE UP (ref 10.3–14.5)
RBC # FLD: 13.7 % — SIGNIFICANT CHANGE UP (ref 10.3–14.5)
SODIUM SERPL-SCNC: 137 MMOL/L — SIGNIFICANT CHANGE UP (ref 135–145)
WBC # BLD: 10.16 K/UL — SIGNIFICANT CHANGE UP (ref 3.8–10.5)
WBC # BLD: 9.2 K/UL — SIGNIFICANT CHANGE UP (ref 3.8–10.5)
WBC # FLD AUTO: 10.16 K/UL — SIGNIFICANT CHANGE UP (ref 3.8–10.5)
WBC # FLD AUTO: 9.2 K/UL — SIGNIFICANT CHANGE UP (ref 3.8–10.5)

## 2023-07-01 PROCEDURE — 99232 SBSQ HOSP IP/OBS MODERATE 35: CPT

## 2023-07-01 RX ORDER — ASPIRIN/CALCIUM CARB/MAGNESIUM 324 MG
1 TABLET ORAL
Qty: 30 | Refills: 0
Start: 2023-07-01 | End: 2023-07-30

## 2023-07-01 RX ORDER — TRAZODONE HCL 50 MG
1 TABLET ORAL
Qty: 30 | Refills: 0
Start: 2023-07-01 | End: 2023-07-30

## 2023-07-01 RX ADMIN — Medication 81 MILLIGRAM(S): at 10:45

## 2023-07-01 RX ADMIN — Medication 50 MILLIGRAM(S): at 21:46

## 2023-07-01 RX ADMIN — ENOXAPARIN SODIUM 40 MILLIGRAM(S): 100 INJECTION SUBCUTANEOUS at 05:10

## 2023-07-01 RX ADMIN — LISINOPRIL 10 MILLIGRAM(S): 2.5 TABLET ORAL at 05:11

## 2023-07-01 RX ADMIN — ATORVASTATIN CALCIUM 10 MILLIGRAM(S): 80 TABLET, FILM COATED ORAL at 21:47

## 2023-07-01 NOTE — DISCHARGE NOTE PROVIDER - CARE PROVIDER_API CALL
Batool Villafuerte NP in Adult Health  [address] [place] [id number]  Phone: [phone]  Fax: [phone]  Follow Up Time:

## 2023-07-01 NOTE — PROGRESS NOTE ADULT - SUBJECTIVE AND OBJECTIVE BOX
MIYA WILKINS    [id number]    77y      Female    CC: abnormal ekg    INTERVAL HPI/OVERNIGHT EVENTS: pt seen and examined. 4.5s svt reported o/n on tele    REVIEW OF SYSTEMS:  unable to obtain    Vital Signs Last 24 Hrs  T(C): 36.6 (01 Jul 2023 10:52), Max: 37.1 (01 Jul 2023 04:38)  T(F): 97.8 (01 Jul 2023 10:52), Max: 98.7 (01 Jul 2023 04:38)  HR: 96 (01 Jul 2023 10:52) (86 - 102)  BP: 114/69 (01 Jul 2023 10:52) (80/60 - 126/80)  BP(mean): --  RR: 18 (01 Jul 2023 10:52) (16 - 18)  SpO2: 95% (01 Jul 2023 10:52) (95% - 96%)    Parameters below as of 01 Jul 2023 10:52  Patient On (Oxygen Delivery Method): room air        PHYSICAL EXAM:    GENERAL: NAD  HEENT: PERRL  NECK: soft, supple  CHEST/LUNG: Clear to auscultation bilaterally  HEART: S1S2+, Regular rate and rhythm  ABDOMEN: Soft, Nontender, Nondistended; Bowel sounds present  SKIN: warm, dry  NEURO: Awake, alert; confused   PSYCH: calm at time of exam     LABS:                        10.8   9.20  )-----------( 295      ( 01 Jul 2023 05:03 )             33.2     07-01    137  |  103  |  31.6<H>  ----------------------------<  74  4.1   |  22.0  |  1.06    Ca    9.4      01 Jul 2023 05:03  Phos  3.6     06-30  Mg     2.0     07-01        Urinalysis Basic - ( 01 Jul 2023 05:03 )    Color: x / Appearance: x / SG: x / pH: x  Gluc: 74 mg/dL / Ketone: x  / Bili: x / Urobili: x   Blood: x / Protein: x / Nitrite: x   Leuk Esterase: x / RBC: x / WBC x   Sq Epi: x / Non Sq Epi: x / Bacteria: x          MEDICATIONS  (STANDING):  aspirin enteric coated 81 milliGRAM(s) Oral daily  atorvastatin 10 milliGRAM(s) Oral at bedtime  buDESOnide    Inhalation Suspension 0.5 milliGRAM(s) Inhalation two times a day  enoxaparin Injectable 40 milliGRAM(s) SubCutaneous every 24 hours  lisinopril 10 milliGRAM(s) Oral daily  traZODone 50 milliGRAM(s) Oral at bedtime    MEDICATIONS  (PRN):  acetaminophen     Tablet .. 650 milliGRAM(s) Oral every 6 hours PRN Temp greater or equal to 38C (100.4F), Mild Pain (1 - 3), Moderate Pain (4 - 6)  albuterol/ipratropium for Nebulization 3 milliLiter(s) Nebulizer every 6 hours PRN Shortness of Breath and/or Wheezing  OLANZapine Injectable 2.5 milliGRAM(s) IntraMuscular every 6 hours PRN Agitation  ondansetron Injectable 4 milliGRAM(s) IV Push every 6 hours PRN Nausea and/or Vomiting      RADIOLOGY & ADDITIONAL TESTS:

## 2023-07-01 NOTE — DISCHARGE NOTE PROVIDER - DETAILS OF MALNUTRITION DIAGNOSIS/DIAGNOSES
This patient has been assessed with a concern for Malnutrition and was treated during this hospitalization for the following Nutrition diagnosis/diagnoses:     -  06/30/2023: Severe protein-calorie malnutrition   -  06/30/2023: Underweight (BMI < 19)

## 2023-07-01 NOTE — DISCHARGE NOTE PROVIDER - HOSPITAL COURSE
77y/oF hx dementia, HTN, COPD who was brought in by EMS after she was found wandering on the [place], after climbing out of the window. In the ER, found to have dynamic EKG changes with new TWI in inferior leads not seen on EKG, admitted for further w/u. Trops neg x3, seen by cardio. TTE with LVEF 60-65%, grade I ddx, trace MVR, mod aortic root calcification. Seen by psych for behavioral disturbance. started on qhs trazodone and PRN IM zyprexa. The patient is a 77 year old female with a history of dementia, hypertension, COPD BIBEMS after being found wandering the street. In the ER, EKG with TWI in the inferior leads which were new. Cardiology consulted; cardiac enzymes negative. TTE with no wall motion abnormality. Cardiology signed off. Course complicated by dementia with behavioural disturbance. CT head and C Spine were negative. Psychiatry consulted for medication recommendations. Started on Trazodone at bedtime with improvement. To be discharged home with 24 hour family supervision.

## 2023-07-01 NOTE — PROGRESS NOTE ADULT - ASSESSMENT
77y/oF PMH dementia, HTN, COPD, BIBEMS after found wandering the street she lives on and was found to have dynamic EKG changes with new TWI in inferior leads not seen on EKG, admitted for further w/u     abnormal ekg   -trops neg x3   -cardio recs appreciated   -cont asa, lipitor   -TTE: LVEF 60-65%, grade I ddx, trace mvr, mod aortic root calcification     Hx dementia with behavioral disturbance   -cth neg for acute finding   -psych recs appreciated   -cont trazodone 50mg qhs   -prn IM zyprexa q6h 2.5mg for acute agitation --required 2 doses last 48 hrs (7/1)     HTN, hLD   -lisinopril in house (for home benazepril), cont atorvastatin     COPD   -not in acute exacerbation  -cont pulmicort, duoneb     vte ppx: lovenox sq     dispo: home when medically stable  Protocol For Photochemotherapy For Severe Photoresponsive Dermatoses: Tar And Broad Band Uvb (Goeckerman Treatment): The patient received Photochemotherapy for severe photoresponsive dermatoses: Tar and Broad Band UVB (Goeckerman treatment) requiring at least 4 to 8 hours of care under direct physician supervision.

## 2023-07-01 NOTE — DISCHARGE NOTE PROVIDER - NSDCDCMDCOMP_GEN_ALL_CORE
Quality 111:Pneumonia Vaccination Status For Older Adults: Pneumococcal Vaccination Previously Received Detail Level: Detailed Quality 130: Documentation Of Current Medications In The Medical Record: Current Medications Documented This document is complete and the patient is ready for discharge.

## 2023-07-01 NOTE — DISCHARGE NOTE PROVIDER - NSDCCPCAREPLAN_GEN_ALL_CORE_FT
PRINCIPAL DISCHARGE DIAGNOSIS  Diagnosis: EKG abnormalities  Assessment and Plan of Treatment:       SECONDARY DISCHARGE DIAGNOSES  Diagnosis: Dementia  Assessment and Plan of Treatment:      PRINCIPAL DISCHARGE DIAGNOSIS  Diagnosis: Dementia  Assessment and Plan of Treatment: With behavioural disturbance with improvement   Continue Trazodone at bedtime      SECONDARY DISCHARGE DIAGNOSES  Diagnosis: Abnormal EKG  Assessment and Plan of Treatment: Started on Aspirin 81mg PO Once a day  Continue lipitor and benzapril    Diagnosis: Severe protein-calorie malnutrition  Assessment and Plan of Treatment: Multivitamin daily  Vitamin C 500mg daily  Ensure three times a day

## 2023-07-01 NOTE — DISCHARGE NOTE PROVIDER - NSDCFUSCHEDAPPT_GEN_ALL_CORE_FT
Batool Villafuerte Physician [facility]  NEUROLOGY [address] [place] [address]  Scheduled Appointment: 07/26/2023

## 2023-07-01 NOTE — DISCHARGE NOTE PROVIDER - NSDCMRMEDTOKEN_GEN_ALL_CORE_FT
Albuterol (Eqv-ProAir HFA) 90 mcg/inh inhalation aerosol: 2 puff(s) inhaled every 6 hours as needed for  shortness of breath and/or wheezing  arformoterol 15 mcg/2 mL inhalation solution: 2 puff(s) by nebulizer 2 times a day  aspirin 81 mg oral delayed release tablet: 1 tab(s) orally once a day  atorvastatin 10 mg oral tablet: 1 tab(s) orally once a day  benazepril 10 mg oral tablet: 1 tab(s) orally once a day  budesonide 0.5 mg/2 mL inhalation suspension: 2 milliliter(s) by nebulizer 2 times a day  LORazepam 1 mg oral tablet: 1 tab(s) orally 3 times a day as needed for  anxiety  traZODone 50 mg oral tablet: 1 tab(s) orally once a day (at bedtime)   Albuterol (Eqv-ProAir HFA) 90 mcg/inh inhalation aerosol: 2 puff(s) inhaled every 6 hours as needed for  shortness of breath and/or wheezing  arformoterol 15 mcg/2 mL inhalation solution: 2 puff(s) by nebulizer 2 times a day  ascorbic acid 500 mg oral tablet: 1 tab(s) orally once a day  aspirin 81 mg oral delayed release tablet: 1 tab(s) orally once a day  atorvastatin 10 mg oral tablet: 1 tab(s) orally once a day  benazepril 10 mg oral tablet: 1 tab(s) orally once a day  budesonide 0.5 mg/2 mL inhalation suspension: 2 milliliter(s) by nebulizer 2 times a day  Multiple Vitamins oral tablet: 1 tab(s) orally once a day  traZODone 50 mg oral tablet: 1 tab(s) orally once a day (at bedtime)

## 2023-07-02 PROCEDURE — 99232 SBSQ HOSP IP/OBS MODERATE 35: CPT

## 2023-07-02 RX ORDER — ASCORBIC ACID 60 MG
500 TABLET,CHEWABLE ORAL DAILY
Refills: 0 | Status: DISCONTINUED | OUTPATIENT
Start: 2023-07-02 | End: 2023-07-05

## 2023-07-02 RX ADMIN — Medication 81 MILLIGRAM(S): at 12:53

## 2023-07-02 RX ADMIN — ENOXAPARIN SODIUM 40 MILLIGRAM(S): 100 INJECTION SUBCUTANEOUS at 05:16

## 2023-07-02 RX ADMIN — Medication 1 TABLET(S): at 17:48

## 2023-07-02 RX ADMIN — Medication 500 MILLIGRAM(S): at 17:49

## 2023-07-02 RX ADMIN — LISINOPRIL 10 MILLIGRAM(S): 2.5 TABLET ORAL at 05:16

## 2023-07-02 RX ADMIN — Medication 50 MILLIGRAM(S): at 21:57

## 2023-07-02 RX ADMIN — ATORVASTATIN CALCIUM 10 MILLIGRAM(S): 80 TABLET, FILM COATED ORAL at 21:57

## 2023-07-02 NOTE — PROGRESS NOTE ADULT - ASSESSMENT
The patient is a 77 year old female with a history of dementia, hypertension, COPD BIBEMS after being found wandering the street. In the ER, EKG with TWI in the inferior leads which were new. Cardiology consulted; cardiac enzymes negative. TTE with no wall motion abnormality. Cardiology signed off. Course complicated by dementia with behavioural disturbance. CT head and C Spine were negative. Psychiatry consulted for medication recommendations.     Assessment/Plan:    1. Abnormal EKG  - Troponin negative x 2  - TTE with no wall motion abnormality, normal LVSF  - Aspirin, statin, BB  - No further work up per cardiology    2. Dementia with behavioural disturbance  - 1:1 for safety  - Trazodone 50mg QHS  - Zyprexa PRN  - Psychiatry following    3. Hypertension  - On Acei    4. Right biceps hematoma  - Monitor closely    5. COPD   -not in acute exacerbation  -cont Pulmicort, Duoneb     6. Severe protein calorie malnutrition  - Add MV, Vitamin C  _ Ensure TID    VTE Lovenox subcut    PT evaluation  ST Evaluation    Discharge disposition; Home once medically stable     dispo: home when medically stable

## 2023-07-02 NOTE — PROGRESS NOTE ADULT - SUBJECTIVE AND OBJECTIVE BOX
CC: Follow up     INTERVAL HPI/OVERNIGHT EVENTS: Patient seen and examined, restless overnight. 1:1 for safety       Vital Signs Last 24 Hrs  T(C): 36.3 (02 Jul 2023 15:50), Max: 36.9 (02 Jul 2023 04:54)  T(F): 97.4 (02 Jul 2023 15:50), Max: 98.4 (02 Jul 2023 04:54)  HR: 82 (02 Jul 2023 15:50) (79 - 92)  BP: 149/81 (02 Jul 2023 15:50) (101/66 - 149/81)  BP(mean): --  RR: 18 (02 Jul 2023 15:50) (18 - 18)  SpO2: 96% (02 Jul 2023 15:50) (94% - 96%)    Parameters below as of 02 Jul 2023 15:50  Patient On (Oxygen Delivery Method): room air        PHYSICAL EXAM:    GENERAL: NAD, AOX2  HEAD:  Atraumatic, Normocephalic  EYES conjunctiva and sclera clear  ENMT: Moist mucous membranes  CHEST/LUNG: Clear to auscultation bilaterally; No rales, rhonchi, wheezing, or rubs  HEART: Regular rate and rhythm; No murmurs, rubs, or gallops  ABDOMEN: Soft, Nontender, Nondistended; Bowel sounds present  EXTREMITIES:  2+ Peripheral Pulses, No clubbing, cyanosis, or edema        MEDICATIONS  (STANDING):  aspirin enteric coated 81 milliGRAM(s) Oral daily  atorvastatin 10 milliGRAM(s) Oral at bedtime  buDESOnide    Inhalation Suspension 0.5 milliGRAM(s) Inhalation two times a day  enoxaparin Injectable 40 milliGRAM(s) SubCutaneous every 24 hours  lisinopril 10 milliGRAM(s) Oral daily  traZODone 50 milliGRAM(s) Oral at bedtime    MEDICATIONS  (PRN):  acetaminophen     Tablet .. 650 milliGRAM(s) Oral every 6 hours PRN Temp greater or equal to 38C (100.4F), Mild Pain (1 - 3), Moderate Pain (4 - 6)  albuterol/ipratropium for Nebulization 3 milliLiter(s) Nebulizer every 6 hours PRN Shortness of Breath and/or Wheezing  OLANZapine Injectable 2.5 milliGRAM(s) IntraMuscular every 6 hours PRN Agitation  ondansetron Injectable 4 milliGRAM(s) IV Push every 6 hours PRN Nausea and/or Vomiting      Allergies    No Known Allergies    Intolerances          LABS:                          10.8   9.20  )-----------( 295      ( 01 Jul 2023 05:03 )             33.2     07-01    137  |  103  |  31.6<H>  ----------------------------<  74  4.1   |  22.0  |  1.06    Ca    9.4      01 Jul 2023 05:03  Phos  3.6     06-30  Mg     2.0     07-01        Urinalysis Basic - ( 01 Jul 2023 05:03 )    Color: x / Appearance: x / SG: x / pH: x  Gluc: 74 mg/dL / Ketone: x  / Bili: x / Urobili: x   Blood: x / Protein: x / Nitrite: x   Leuk Esterase: x / RBC: x / WBC x   Sq Epi: x / Non Sq Epi: x / Bacteria: x        RADIOLOGY & ADDITIONAL TESTS:

## 2023-07-03 LAB
ALBUMIN SERPL ELPH-MCNC: 3.9 G/DL — SIGNIFICANT CHANGE UP (ref 3.3–5.2)
ALP SERPL-CCNC: 56 U/L — SIGNIFICANT CHANGE UP (ref 40–120)
ALT FLD-CCNC: 13 U/L — SIGNIFICANT CHANGE UP
ANION GAP SERPL CALC-SCNC: 13 MMOL/L — SIGNIFICANT CHANGE UP (ref 5–17)
AST SERPL-CCNC: 23 U/L — SIGNIFICANT CHANGE UP
BILIRUB SERPL-MCNC: 0.9 MG/DL — SIGNIFICANT CHANGE UP (ref 0.4–2)
BUN SERPL-MCNC: 25.5 MG/DL — HIGH (ref 8–20)
CALCIUM SERPL-MCNC: 9.7 MG/DL — SIGNIFICANT CHANGE UP (ref 8.4–10.5)
CHLORIDE SERPL-SCNC: 102 MMOL/L — SIGNIFICANT CHANGE UP (ref 96–108)
CO2 SERPL-SCNC: 22 MMOL/L — SIGNIFICANT CHANGE UP (ref 22–29)
CREAT SERPL-MCNC: 1 MG/DL — SIGNIFICANT CHANGE UP (ref 0.5–1.3)
EGFR: 57 ML/MIN/1.73M2 — LOW
GLUCOSE SERPL-MCNC: 79 MG/DL — SIGNIFICANT CHANGE UP (ref 70–99)
HCT VFR BLD CALC: 31.9 % — LOW (ref 34.5–45)
HGB BLD-MCNC: 10.5 G/DL — LOW (ref 11.5–15.5)
MCHC RBC-ENTMCNC: 30.8 PG — SIGNIFICANT CHANGE UP (ref 27–34)
MCHC RBC-ENTMCNC: 32.9 GM/DL — SIGNIFICANT CHANGE UP (ref 32–36)
MCV RBC AUTO: 93.5 FL — SIGNIFICANT CHANGE UP (ref 80–100)
PLATELET # BLD AUTO: 269 K/UL — SIGNIFICANT CHANGE UP (ref 150–400)
POTASSIUM SERPL-MCNC: 4.7 MMOL/L — SIGNIFICANT CHANGE UP (ref 3.5–5.3)
POTASSIUM SERPL-SCNC: 4.7 MMOL/L — SIGNIFICANT CHANGE UP (ref 3.5–5.3)
PROT SERPL-MCNC: 5.7 G/DL — LOW (ref 6.6–8.7)
RBC # BLD: 3.41 M/UL — LOW (ref 3.8–5.2)
RBC # FLD: 13.4 % — SIGNIFICANT CHANGE UP (ref 10.3–14.5)
SODIUM SERPL-SCNC: 137 MMOL/L — SIGNIFICANT CHANGE UP (ref 135–145)
WBC # BLD: 7.91 K/UL — SIGNIFICANT CHANGE UP (ref 3.8–10.5)
WBC # FLD AUTO: 7.91 K/UL — SIGNIFICANT CHANGE UP (ref 3.8–10.5)

## 2023-07-03 PROCEDURE — 99232 SBSQ HOSP IP/OBS MODERATE 35: CPT

## 2023-07-03 RX ORDER — SODIUM CHLORIDE 9 MG/ML
500 INJECTION INTRAMUSCULAR; INTRAVENOUS; SUBCUTANEOUS ONCE
Refills: 0 | Status: COMPLETED | OUTPATIENT
Start: 2023-07-03 | End: 2023-07-03

## 2023-07-03 RX ORDER — OLANZAPINE 15 MG/1
2.5 TABLET, FILM COATED ORAL ONCE
Refills: 0 | Status: COMPLETED | OUTPATIENT
Start: 2023-07-03 | End: 2023-07-03

## 2023-07-03 RX ADMIN — ATORVASTATIN CALCIUM 10 MILLIGRAM(S): 80 TABLET, FILM COATED ORAL at 23:16

## 2023-07-03 RX ADMIN — LISINOPRIL 10 MILLIGRAM(S): 2.5 TABLET ORAL at 05:33

## 2023-07-03 RX ADMIN — Medication 50 MILLIGRAM(S): at 23:15

## 2023-07-03 RX ADMIN — Medication 1 TABLET(S): at 11:55

## 2023-07-03 RX ADMIN — OLANZAPINE 2.5 MILLIGRAM(S): 15 TABLET, FILM COATED ORAL at 21:41

## 2023-07-03 RX ADMIN — SODIUM CHLORIDE 1000 MILLILITER(S): 9 INJECTION INTRAMUSCULAR; INTRAVENOUS; SUBCUTANEOUS at 14:21

## 2023-07-03 RX ADMIN — Medication 500 MILLIGRAM(S): at 11:55

## 2023-07-03 RX ADMIN — ENOXAPARIN SODIUM 40 MILLIGRAM(S): 100 INJECTION SUBCUTANEOUS at 05:33

## 2023-07-03 RX ADMIN — OLANZAPINE 2.5 MILLIGRAM(S): 15 TABLET, FILM COATED ORAL at 21:02

## 2023-07-03 RX ADMIN — Medication 0.5 MILLIGRAM(S): at 07:59

## 2023-07-03 RX ADMIN — Medication 81 MILLIGRAM(S): at 11:55

## 2023-07-03 NOTE — PROGRESS NOTE ADULT - SUBJECTIVE AND OBJECTIVE BOX
CC: Follow up     INTERVAL HPI/OVERNIGHT EVENTS: Patient seen and examined, calm this morning. Has not required IM zyprexa in 48 hours       Vital Signs Last 24 Hrs  T(C): 36.4 (03 Jul 2023 04:27), Max: 36.4 (03 Jul 2023 04:27)  T(F): 97.5 (03 Jul 2023 04:27), Max: 97.5 (03 Jul 2023 04:27)  HR: 80 (03 Jul 2023 10:07) (78 - 82)  BP: 96/50 (03 Jul 2023 12:00) (90/50 - 149/81)  BP(mean): --  RR: 18 (03 Jul 2023 10:07) (18 - 18)  SpO2: 92% (03 Jul 2023 10:07) (92% - 96%)    Parameters below as of 03 Jul 2023 10:07  Patient On (Oxygen Delivery Method): room air        PHYSICAL EXAM:    GENERAL: NAD  HEAD:  Atraumatic, Normocephalic  EYES: conjunctiva and sclera clear  ENMT: Moist mucous membranes  CHEST/LUNG: Clear to auscultation bilaterally; No rales, rhonchi, wheezing, or rubs  HEART: Regular rate and rhythm; No murmurs, rubs, or gallops  ABDOMEN: Soft, Nontender, Nondistended; Bowel sounds present  EXTREMITIES:  2+ Peripheral Pulses, No clubbing, cyanosis, or edema        MEDICATIONS  (STANDING):  ascorbic acid 500 milliGRAM(s) Oral daily  aspirin enteric coated 81 milliGRAM(s) Oral daily  atorvastatin 10 milliGRAM(s) Oral at bedtime  buDESOnide    Inhalation Suspension 0.5 milliGRAM(s) Inhalation two times a day  enoxaparin Injectable 40 milliGRAM(s) SubCutaneous every 24 hours  lisinopril 10 milliGRAM(s) Oral daily  multivitamin 1 Tablet(s) Oral daily  traZODone 50 milliGRAM(s) Oral at bedtime    MEDICATIONS  (PRN):  acetaminophen     Tablet .. 650 milliGRAM(s) Oral every 6 hours PRN Temp greater or equal to 38C (100.4F), Mild Pain (1 - 3), Moderate Pain (4 - 6)  OLANZapine Injectable 2.5 milliGRAM(s) IntraMuscular every 6 hours PRN Agitation  ondansetron Injectable 4 milliGRAM(s) IV Push every 6 hours PRN Nausea and/or Vomiting      Allergies    No Known Allergies    Intolerances          LABS:                          10.5   7.91  )-----------( 269      ( 03 Jul 2023 04:50 )             31.9     07-03    137  |  102  |  25.5<H>  ----------------------------<  79  4.7   |  22.0  |  1.00    Ca    9.7      03 Jul 2023 04:50    TPro  5.7<L>  /  Alb  3.9  /  TBili  0.9  /  DBili  x   /  AST  23  /  ALT  13  /  AlkPhos  56  07-03      Urinalysis Basic - ( 03 Jul 2023 04:50 )    Color: x / Appearance: x / SG: x / pH: x  Gluc: 79 mg/dL / Ketone: x  / Bili: x / Urobili: x   Blood: x / Protein: x / Nitrite: x   Leuk Esterase: x / RBC: x / WBC x   Sq Epi: x / Non Sq Epi: x / Bacteria: x        RADIOLOGY & ADDITIONAL TESTS:

## 2023-07-03 NOTE — PROGRESS NOTE ADULT - ASSESSMENT
The patient is a 77 year old female with a history of dementia, hypertension, COPD BIBEMS after being found wandering the street. In the ER, EKG with TWI in the inferior leads which were new. Cardiology consulted; cardiac enzymes negative. TTE with no wall motion abnormality. Cardiology signed off. Course complicated by dementia with behavioural disturbance. CT head and C Spine were negative. Psychiatry consulted for medication recommendations.     Assessment/Plan:    1. Abnormal EKG  - Troponin negative x 2  - TTE with no wall motion abnormality, normal LVSF  - Aspirin, statin  - No further work up per cardiology    2. Dementia with behavioural disturbance  - 1:1 for safety  - Trazodone 50mg QHS  - Zyprexa PRN  - Psychiatry following    3. Hypertension  - Hold Lisinopril for low bp  - 500cc NS Bolus     4. Right biceps hematoma  - Monitor closely    5. COPD   -not in acute exacerbation  -cont Pulmicort, Duoneb     6. Severe protein calorie malnutrition  - Add MV, Vitamin C  _ Ensure TID    VTE Lovenox subcut    PT evaluation  ST Evaluation    Discharge disposition; Home once medically stable in 24 hours     dispo: home when medically stable    The patient is a 77 year old female with a history of dementia, hypertension, COPD BIBEMS after being found wandering the street. In the ER, EKG with TWI in the inferior leads which were new. Cardiology consulted; cardiac enzymes negative. TTE with no wall motion abnormality. Cardiology signed off. Course complicated by dementia with behavioural disturbance. CT head and C Spine were negative. Psychiatry consulted for medication recommendations.     Assessment/Plan:    1. Abnormal EKG  - Troponin negative x 2  - TTE with no wall motion abnormality, normal LVSF  - Aspirin, statin  - No further work up per cardiology    2. Dementia with behavioural disturbance  - 1:1 for safety  - Trazodone 50mg QHS  - Zyprexa PRN  - Psychiatry following    3. Hypertension  - Hold Lisinopril for low bp  - 500cc NS Bolus     4. Right biceps hematoma  - Monitor closely    5. COPD   -not in acute exacerbation  -cont Pulmicort, Duoneb     6. Severe protein calorie malnutrition  - Add MV, Vitamin C  _ Ensure TID    VTE Lovenox subcut    PT evaluation  ST Evaluation    Discharge disposition; Home once medically stable in 24 hours   Attempted to reach patient's son Gonzalez  to discuss discharge plan with no answer  The patient is a 77 year old female with a history of dementia, hypertension, COPD BIBEMS after being found wandering the street. In the ER, EKG with TWI in the inferior leads which were new. Cardiology consulted; cardiac enzymes negative. TTE with no wall motion abnormality. Cardiology signed off. Course complicated by dementia with behavioural disturbance. CT head and C Spine were negative. Psychiatry consulted for medication recommendations.     Assessment/Plan:    1. Abnormal EKG  - Troponin negative x 2  - TTE with no wall motion abnormality, normal LVSF  - Aspirin, statin  - No further work up per cardiology    2. Dementia with behavioural disturbance  - 1:1 for safety  - Trazodone 50mg QHS  - Zyprexa PRN  - Psychiatry following    3. Hypertension  - Hold Lisinopril for low bp  - 500cc NS Bolus     4. Right biceps hematoma  - Monitor closely    5. COPD   -not in acute exacerbation  -cont Pulmicort, Duoneb     6. Severe protein calorie malnutrition  - Add MV, Vitamin C  _ Ensure TID    VTE Lovenox subcut    PT evaluation  ST Evaluation    Discharge disposition; Home once medically stable in 24 hours     Plan discussed with patient's son  Gonzalez , stable for discharge home with supervision in AM

## 2023-07-04 ENCOUNTER — TRANSCRIPTION ENCOUNTER (OUTPATIENT)
Age: 78
End: 2023-07-04

## 2023-07-04 LAB
ALBUMIN SERPL ELPH-MCNC: 4 G/DL — SIGNIFICANT CHANGE UP (ref 3.3–5.2)
ALP SERPL-CCNC: 65 U/L — SIGNIFICANT CHANGE UP (ref 40–120)
ALT FLD-CCNC: 15 U/L — SIGNIFICANT CHANGE UP
ANION GAP SERPL CALC-SCNC: 12 MMOL/L — SIGNIFICANT CHANGE UP (ref 5–17)
AST SERPL-CCNC: 27 U/L — SIGNIFICANT CHANGE UP
BILIRUB SERPL-MCNC: 0.7 MG/DL — SIGNIFICANT CHANGE UP (ref 0.4–2)
BUN SERPL-MCNC: 28.2 MG/DL — HIGH (ref 8–20)
CALCIUM SERPL-MCNC: 9.8 MG/DL — SIGNIFICANT CHANGE UP (ref 8.4–10.5)
CHLORIDE SERPL-SCNC: 104 MMOL/L — SIGNIFICANT CHANGE UP (ref 96–108)
CO2 SERPL-SCNC: 24 MMOL/L — SIGNIFICANT CHANGE UP (ref 22–29)
CREAT SERPL-MCNC: 1.07 MG/DL — SIGNIFICANT CHANGE UP (ref 0.5–1.3)
EGFR: 53 ML/MIN/1.73M2 — LOW
GLUCOSE SERPL-MCNC: 82 MG/DL — SIGNIFICANT CHANGE UP (ref 70–99)
POTASSIUM SERPL-MCNC: 4.1 MMOL/L — SIGNIFICANT CHANGE UP (ref 3.5–5.3)
POTASSIUM SERPL-SCNC: 4.1 MMOL/L — SIGNIFICANT CHANGE UP (ref 3.5–5.3)
PROT SERPL-MCNC: 5.8 G/DL — LOW (ref 6.6–8.7)
SODIUM SERPL-SCNC: 140 MMOL/L — SIGNIFICANT CHANGE UP (ref 135–145)

## 2023-07-04 PROCEDURE — 99239 HOSP IP/OBS DSCHRG MGMT >30: CPT

## 2023-07-04 RX ORDER — ASCORBIC ACID 60 MG
1 TABLET,CHEWABLE ORAL
Qty: 0 | Refills: 0 | DISCHARGE
Start: 2023-07-04

## 2023-07-04 RX ADMIN — Medication 81 MILLIGRAM(S): at 11:26

## 2023-07-04 RX ADMIN — Medication 500 MILLIGRAM(S): at 11:26

## 2023-07-04 RX ADMIN — ATORVASTATIN CALCIUM 10 MILLIGRAM(S): 80 TABLET, FILM COATED ORAL at 21:41

## 2023-07-04 RX ADMIN — ENOXAPARIN SODIUM 40 MILLIGRAM(S): 100 INJECTION SUBCUTANEOUS at 05:26

## 2023-07-04 RX ADMIN — Medication 1 TABLET(S): at 11:26

## 2023-07-04 RX ADMIN — Medication 0.5 MILLIGRAM(S): at 09:23

## 2023-07-04 RX ADMIN — Medication 50 MILLIGRAM(S): at 21:40

## 2023-07-04 NOTE — DISCHARGE NOTE NURSING/CASE MANAGEMENT/SOCIAL WORK - NSDCPEFALRISK_GEN_ALL_CORE
For information on Fall & Injury Prevention, visit: [email or web address] OR  [email or web address] OR  https://www.cdc.gov/steadi/patient.html

## 2023-07-04 NOTE — PROGRESS NOTE ADULT - ASSESSMENT
The patient is a 77 year old female with a history of dementia, hypertension, COPD BIBEMS after being found wandering the street. In the ER, EKG with TWI in the inferior leads which were new. Cardiology consulted; cardiac enzymes negative. TTE with no wall motion abnormality. Cardiology signed off. Course complicated by dementia with behavioural disturbance. CT head and C Spine were negative. Psychiatry consulted for medication recommendations.     Assessment/Plan:    1. Abnormal EKG  - Troponin negative x 2  - TTE with no wall motion abnormality, normal LVSF  - Aspirin, statin  - No further work up per cardiology    2. Dementia with behavioural disturbance  - 1:1 for safety  - Trazodone 50mg QHS  - Zyprexa PRN    3. Hypertension  - Stable    4. Right biceps hematoma  - Monitor closely    5. COPD   -not in acute exacerbation  -cont Pulmicort, Duoneb     6. Severe protein calorie malnutrition  - Add MV, Vitamin C  _ Ensure TID    VTE Lovenox subcut    PT evaluation- Ambulating independently with supervision  ST Evaluation- Puree with thin liquis    Medically stable for discharge home with 24 hour supervision

## 2023-07-04 NOTE — PROGRESS NOTE ADULT - SUBJECTIVE AND OBJECTIVE BOX
CC: Follow up    INTERVAL HPI/OVERNIGHT EVENTS: Patient seen and examined, no acute events overnight.       Vital Signs Last 24 Hrs  T(C): 36.6 (04 Jul 2023 04:50), Max: 36.9 (03 Jul 2023 16:56)  T(F): 97.8 (04 Jul 2023 04:50), Max: 98.4 (03 Jul 2023 16:56)  HR: 76 (04 Jul 2023 10:29) (75 - 87)  BP: 110/69 (04 Jul 2023 10:29) (96/50 - 135/75)  BP(mean): --  RR: 18 (04 Jul 2023 04:50) (18 - 18)  SpO2: 93% (04 Jul 2023 09:31) (93% - 95%)    Parameters below as of 04 Jul 2023 09:31  Patient On (Oxygen Delivery Method): room air        PHYSICAL EXAM:    GENERAL: NAD, wAOX1  HEAD:  Atraumatic, Normocephalic  EYES:  conjunctiva and sclera clear  CHEST/LUNG: Clear to auscultation bilaterally; No rales, rhonchi, wheezing, or rubs  HEART: Regular rate and rhythm; No murmurs, rubs, or gallops  ABDOMEN: Soft, Nontender, Nondistended; Bowel sounds present  EXTREMITIES:  2+ Peripheral Pulses, No clubbing, cyanosis, or edema        MEDICATIONS  (STANDING):  ascorbic acid 500 milliGRAM(s) Oral daily  aspirin enteric coated 81 milliGRAM(s) Oral daily  atorvastatin 10 milliGRAM(s) Oral at bedtime  buDESOnide    Inhalation Suspension 0.5 milliGRAM(s) Inhalation two times a day  enoxaparin Injectable 40 milliGRAM(s) SubCutaneous every 24 hours  multivitamin 1 Tablet(s) Oral daily  traZODone 50 milliGRAM(s) Oral at bedtime    MEDICATIONS  (PRN):  acetaminophen     Tablet .. 650 milliGRAM(s) Oral every 6 hours PRN Temp greater or equal to 38C (100.4F), Mild Pain (1 - 3), Moderate Pain (4 - 6)  OLANZapine Injectable 2.5 milliGRAM(s) IntraMuscular every 6 hours PRN Agitation  ondansetron Injectable 4 milliGRAM(s) IV Push every 6 hours PRN Nausea and/or Vomiting      Allergies    No Known Allergies    Intolerances          LABS:                          10.5   7.91  )-----------( 269      ( 03 Jul 2023 04:50 )             31.9     07-04    140  |  104  |  28.2<H>  ----------------------------<  82  4.1   |  24.0  |  1.07    Ca    9.8      04 Jul 2023 04:50    TPro  5.8<L>  /  Alb  4.0  /  TBili  0.7  /  DBili  x   /  AST  27  /  ALT  15  /  AlkPhos  65  07-04      Urinalysis Basic - ( 04 Jul 2023 04:50 )    Color: x / Appearance: x / SG: x / pH: x  Gluc: 82 mg/dL / Ketone: x  / Bili: x / Urobili: x   Blood: x / Protein: x / Nitrite: x   Leuk Esterase: x / RBC: x / WBC x   Sq Epi: x / Non Sq Epi: x / Bacteria: x        RADIOLOGY & ADDITIONAL TESTS:

## 2023-07-04 NOTE — PHYSICAL THERAPY INITIAL EVALUATION ADULT - LEVEL OF INDEPENDENCE: GAIT, REHAB EVAL
requires supervision secondary to cognition/decreased safety awareness. pt with c/o mild dizziness t/o mobility especially on stairs, BP obtained post session at edge of bed 90/55. BETZY Nolasco made aware/supervision

## 2023-07-04 NOTE — PHYSICAL THERAPY INITIAL EVALUATION ADULT - ADDITIONAL COMMENTS
pt poor historian, per CCC - lives at home with son and DIL 24/7 supervision available, 2 KITTY no stairs inside independent with RW

## 2023-07-04 NOTE — SWALLOW BEDSIDE ASSESSMENT ADULT - SLP GENERAL OBSERVATIONS
Recd awake/upright in bed, A&A Ox1 via yes/no prompt, reduced cognition, pleasantly confused, 0/10 pain pre/post, tolerating RA no overt distress, 1:1 present for encounter

## 2023-07-04 NOTE — DISCHARGE NOTE NURSING/CASE MANAGEMENT/SOCIAL WORK - HISTORY OF COVID-19 VACCINATION
We need to see her for evaluation. In the meantime she should eat 3 meals a day and not skip any meals because this may be low blood sugar causing her to feel faint. She should also eat snacks in between. She may not be eating enough calories to sustain the activities throughout the day such as sports. Make sure she consumes electrolyte rich drinks such as Gatorade when playing sports. I will send her for blood work to be done preferably before she sees me. This is to check blood sugar, liver and kidney functions, complete blood count for anemia. Orders are in the computer.   Vaccine status unknown

## 2023-07-04 NOTE — PHYSICAL THERAPY INITIAL EVALUATION ADULT - PERTINENT HX OF CURRENT PROBLEM, REHAB EVAL
77 year old female with a history of dementia, hypertension, COPD BIBEMS after being found wandering the street. In the ER, EKG with TWI in the inferior leads which were new. Cardiology consulted; cardiac enzymes negative. TTE with no wall motion abnormality. Cardiology signed off. Course complicated by dementia with behavioural disturbance. CT head and C Spine were negative. Psychiatry consulted for medication recommendations.

## 2023-07-04 NOTE — SWALLOW BEDSIDE ASSESSMENT ADULT - SWALLOW EVAL: DIAGNOSIS
Mild oral dysphagia impacted upon by cognitive deficits, marked by incessant verbalization w/PO in oral cavity despite ongoing max efforts to cease, w/reduced attention to bolus w/subsequent delayed oral transfer of all foods (though improved & overall functional for intake of purees). Pharyngeal phase of swallow appears clinically unremarkable, w/no overt s/s penetration or aspiration demonstrated across consistencies.

## 2023-07-04 NOTE — DISCHARGE NOTE NURSING/CASE MANAGEMENT/SOCIAL WORK - PATIENT PORTAL LINK FT
You can access the FollowMyHealth Patient Portal offered by [facility] by registering at the following website: [email or web address]. By joining [facility]’s FollowMyHealth portal, you will also be able to view your health information using other applications (apps) compatible with our system.

## 2023-07-04 NOTE — SWALLOW BEDSIDE ASSESSMENT ADULT - SLP PERTINENT HISTORY OF CURRENT PROBLEM
As per MD note, "The patient is a 77 year old female with a history of dementia, hypertension, COPD BIBEMS after being found wandering the street. In the ER, EKG with TWI in the inferior leads which were new. Cardiology consulted; cardiac enzymes negative. TTE with no wall motion abnormality. Cardiology signed off. Course complicated by dementia with behavioural disturbance. CT head and C Spine were negative. Psychiatry consulted for medication recommendations".

## 2023-07-04 NOTE — SWALLOW BEDSIDE ASSESSMENT ADULT - ORAL PREPARATORY PHASE
reduced attention to bolus w/incessant verbalization Within functional limits reduced attention to bolus w/incessant verbalization/Within functional limits

## 2023-07-05 VITALS
OXYGEN SATURATION: 95 % | HEART RATE: 92 BPM | TEMPERATURE: 99 F | SYSTOLIC BLOOD PRESSURE: 103 MMHG | DIASTOLIC BLOOD PRESSURE: 65 MMHG | RESPIRATION RATE: 20 BRPM

## 2023-07-05 PROCEDURE — 84100 ASSAY OF PHOSPHORUS: CPT

## 2023-07-05 PROCEDURE — 70450 CT HEAD/BRAIN W/O DYE: CPT | Mod: MG

## 2023-07-05 PROCEDURE — 84443 ASSAY THYROID STIM HORMONE: CPT

## 2023-07-05 PROCEDURE — 84132 ASSAY OF SERUM POTASSIUM: CPT

## 2023-07-05 PROCEDURE — 85018 HEMOGLOBIN: CPT

## 2023-07-05 PROCEDURE — 36415 COLL VENOUS BLD VENIPUNCTURE: CPT

## 2023-07-05 PROCEDURE — 85610 PROTHROMBIN TIME: CPT

## 2023-07-05 PROCEDURE — C8929: CPT

## 2023-07-05 PROCEDURE — 82803 BLOOD GASES ANY COMBINATION: CPT

## 2023-07-05 PROCEDURE — 71045 X-RAY EXAM CHEST 1 VIEW: CPT

## 2023-07-05 PROCEDURE — 94640 AIRWAY INHALATION TREATMENT: CPT

## 2023-07-05 PROCEDURE — 85014 HEMATOCRIT: CPT

## 2023-07-05 PROCEDURE — 84295 ASSAY OF SERUM SODIUM: CPT

## 2023-07-05 PROCEDURE — 80053 COMPREHEN METABOLIC PANEL: CPT

## 2023-07-05 PROCEDURE — 76882 US LMTD JT/FCL EVL NVASC XTR: CPT

## 2023-07-05 PROCEDURE — 80048 BASIC METABOLIC PNL TOTAL CA: CPT

## 2023-07-05 PROCEDURE — 80061 LIPID PANEL: CPT

## 2023-07-05 PROCEDURE — 82962 GLUCOSE BLOOD TEST: CPT

## 2023-07-05 PROCEDURE — 83036 HEMOGLOBIN GLYCOSYLATED A1C: CPT

## 2023-07-05 PROCEDURE — 99231 SBSQ HOSP IP/OBS SF/LOW 25: CPT

## 2023-07-05 PROCEDURE — 86850 RBC ANTIBODY SCREEN: CPT

## 2023-07-05 PROCEDURE — 82947 ASSAY GLUCOSE BLOOD QUANT: CPT

## 2023-07-05 PROCEDURE — 96372 THER/PROPH/DIAG INJ SC/IM: CPT

## 2023-07-05 PROCEDURE — 85027 COMPLETE CBC AUTOMATED: CPT

## 2023-07-05 PROCEDURE — G1004: CPT

## 2023-07-05 PROCEDURE — 72125 CT NECK SPINE W/O DYE: CPT | Mod: MG

## 2023-07-05 PROCEDURE — 85730 THROMBOPLASTIN TIME PARTIAL: CPT

## 2023-07-05 PROCEDURE — 82550 ASSAY OF CK (CPK): CPT

## 2023-07-05 PROCEDURE — 99285 EMERGENCY DEPT VISIT HI MDM: CPT

## 2023-07-05 PROCEDURE — 82435 ASSAY OF BLOOD CHLORIDE: CPT

## 2023-07-05 PROCEDURE — 93005 ELECTROCARDIOGRAM TRACING: CPT

## 2023-07-05 PROCEDURE — 82330 ASSAY OF CALCIUM: CPT

## 2023-07-05 PROCEDURE — 85025 COMPLETE CBC W/AUTO DIFF WBC: CPT

## 2023-07-05 PROCEDURE — 83605 ASSAY OF LACTIC ACID: CPT

## 2023-07-05 PROCEDURE — 84484 ASSAY OF TROPONIN QUANT: CPT

## 2023-07-05 PROCEDURE — 86900 BLOOD TYPING SEROLOGIC ABO: CPT

## 2023-07-05 PROCEDURE — 83735 ASSAY OF MAGNESIUM: CPT

## 2023-07-05 PROCEDURE — 94760 N-INVAS EAR/PLS OXIMETRY 1: CPT

## 2023-07-05 PROCEDURE — 83880 ASSAY OF NATRIURETIC PEPTIDE: CPT

## 2023-07-05 PROCEDURE — 86901 BLOOD TYPING SEROLOGIC RH(D): CPT

## 2023-07-05 PROCEDURE — 82553 CREATINE MB FRACTION: CPT

## 2023-07-05 RX ORDER — TRAZODONE HCL 50 MG
1 TABLET ORAL
Qty: 30 | Refills: 0
Start: 2023-07-05 | End: 2023-08-03

## 2023-07-05 RX ADMIN — Medication 81 MILLIGRAM(S): at 10:26

## 2023-07-05 RX ADMIN — ENOXAPARIN SODIUM 40 MILLIGRAM(S): 100 INJECTION SUBCUTANEOUS at 05:10

## 2023-07-05 RX ADMIN — Medication 0.5 MILLIGRAM(S): at 08:26

## 2023-07-05 RX ADMIN — Medication 1 TABLET(S): at 10:26

## 2023-07-05 RX ADMIN — Medication 500 MILLIGRAM(S): at 10:26

## 2023-07-05 NOTE — PROGRESS NOTE ADULT - REASON FOR ADMISSION
Abnormal ECG, dementia with behavioral disturbance

## 2023-07-05 NOTE — PROGRESS NOTE ADULT - SUBJECTIVE AND OBJECTIVE BOX
CC: FOllow up     INTERVAL HPI/OVERNIGHT EVENTS: Patietn seen and examined, no acute complaints overnight.       Vital Signs Last 24 Hrs  T(C): 37.1 (05 Jul 2023 12:10), Max: 37.1 (05 Jul 2023 12:10)  T(F): 98.8 (05 Jul 2023 12:10), Max: 98.8 (05 Jul 2023 12:10)  HR: 92 (05 Jul 2023 12:10) (66 - 92)  BP: 103/65 (05 Jul 2023 12:10) (92/56 - 106/60)  BP(mean): --  RR: 20 (05 Jul 2023 12:10) (18 - 20)  SpO2: 95% (05 Jul 2023 12:10) (94% - 99%)    Parameters below as of 05 Jul 2023 11:05  Patient On (Oxygen Delivery Method): room air        PHYSICAL EXAM:    GENERAL: NAD  HEAD:  Atraumatic, Normocephalic  EYES: EOMI, PERRLA, conjunctiva and sclera clear  CHEST/LUNG: Clear to auscultation bilaterally; No rales, rhonchi, wheezing, or rubs  HEART: Regular rate and rhythm; No murmurs, rubs, or gallops  ABDOMEN: Soft, Nontender, Nondistended; Bowel sounds present  EXTREMITIES:  2+ Peripheral Pulses, No clubbing, cyanosis, or edema  RUE hematoma improving in size         MEDICATIONS  (STANDING):  ascorbic acid 500 milliGRAM(s) Oral daily  aspirin enteric coated 81 milliGRAM(s) Oral daily  atorvastatin 10 milliGRAM(s) Oral at bedtime  buDESOnide    Inhalation Suspension 0.5 milliGRAM(s) Inhalation two times a day  enoxaparin Injectable 40 milliGRAM(s) SubCutaneous every 24 hours  multivitamin 1 Tablet(s) Oral daily  traZODone 50 milliGRAM(s) Oral at bedtime    MEDICATIONS  (PRN):  acetaminophen     Tablet .. 650 milliGRAM(s) Oral every 6 hours PRN Temp greater or equal to 38C (100.4F), Mild Pain (1 - 3), Moderate Pain (4 - 6)  OLANZapine Injectable 2.5 milliGRAM(s) IntraMuscular every 6 hours PRN Agitation  ondansetron Injectable 4 milliGRAM(s) IV Push every 6 hours PRN Nausea and/or Vomiting      Allergies    No Known Allergies    Intolerances          LABS:      07-04    140  |  104  |  28.2<H>  ----------------------------<  82  4.1   |  24.0  |  1.07    Ca    9.8      04 Jul 2023 04:50    TPro  5.8<L>  /  Alb  4.0  /  TBili  0.7  /  DBili  x   /  AST  27  /  ALT  15  /  AlkPhos  65  07-04      Urinalysis Basic - ( 04 Jul 2023 04:50 )    Color: x / Appearance: x / SG: x / pH: x  Gluc: 82 mg/dL / Ketone: x  / Bili: x / Urobili: x   Blood: x / Protein: x / Nitrite: x   Leuk Esterase: x / RBC: x / WBC x   Sq Epi: x / Non Sq Epi: x / Bacteria: x        RADIOLOGY & ADDITIONAL TESTS:

## 2023-07-05 NOTE — PROGRESS NOTE ADULT - NUTRITIONAL ASSESSMENT
This patient has been assessed with a concern for Malnutrition and has been determined to have a diagnosis/diagnoses of Severe protein-calorie malnutrition and Underweight (BMI < 19).    This patient is being managed with:   Diet Pureed-  Supplement Feeding Modality:  Oral  Ensure Enlive Cans or Servings Per Day:  1       Frequency:  Three Times a day  Entered: Jul 2 2023  5:23PM  
This patient has been assessed with a concern for Malnutrition and has been determined to have a diagnosis/diagnoses of Severe protein-calorie malnutrition and Underweight (BMI < 19).    This patient is being managed with:   Diet Pureed-  Entered: Jun 30 2023  6:45PM  
This patient has been assessed with a concern for Malnutrition and has been determined to have a diagnosis/diagnoses of Severe protein-calorie malnutrition and Underweight (BMI < 19).    This patient is being managed with:   Diet Pureed-  Supplement Feeding Modality:  Oral  Ensure Enlive Cans or Servings Per Day:  1       Frequency:  Three Times a day  Entered: Jul 2 2023  5:23PM  
This patient has been assessed with a concern for Malnutrition and has been determined to have a diagnosis/diagnoses of Severe protein-calorie malnutrition and Underweight (BMI < 19).    This patient is being managed with:   Diet DASH/TLC-  Sodium & Cholesterol Restricted  Entered: Jun 28 2023  2:22AM  
This patient has been assessed with a concern for Malnutrition and has been determined to have a diagnosis/diagnoses of Severe protein-calorie malnutrition and Underweight (BMI < 19).    This patient is being managed with:   Diet Pureed-  Entered: Jun 30 2023  6:45PM  
This patient has been assessed with a concern for Malnutrition and has been determined to have a diagnosis/diagnoses of Severe protein-calorie malnutrition and Underweight (BMI < 19).    This patient is being managed with:   Diet Pureed-  Supplement Feeding Modality:  Oral  Ensure Enlive Cans or Servings Per Day:  1       Frequency:  Three Times a day  Entered: Jul 2 2023  5:23PM

## 2023-07-05 NOTE — PROGRESS NOTE ADULT - ASSESSMENT
The patient is a 77 year old female with a history of dementia, hypertension, COPD BIBEMS after being found wandering the street. In the ER, EKG with TWI in the inferior leads which were new. Cardiology consulted; cardiac enzymes negative. TTE with no wall motion abnormality. Cardiology signed off. Course complicated by dementia with behavioural disturbance. CT head and C Spine were negative. Psychiatry consulted for medication recommendations.     Assessment/Plan:    1. Abnormal EKG  - Troponin negative x 2  - TTE with no wall motion abnormality, normal LVSF  - Aspirin, statin  - No further work up per cardiology    2. Dementia with behavioural disturbance  - 1:1 for safety  - Trazodone 50mg QHS  - Zyprexa PRN    3. Hypertension  - Stable    4. Right biceps hematoma  - Stable in size     5. COPD   -not in acute exacerbation  -cont Pulmicort, Duoneb     6. Severe protein calorie malnutrition  - Add MV, Vitamin C  _ Ensure TID    VTE Lovenox subcut    PT evaluation- Ambulating independently with supervision  ST Evaluation- Puree with thin liquis    Medically stable for discharge home with 24 hour supervision

## 2023-07-13 NOTE — ED PROVIDER NOTE - CROS ED CONS ALL NEG
Patient is contacted, set up appointment to est care with Radha Langston. Refilled until appointment.    negative...

## 2023-07-26 ENCOUNTER — NON-APPOINTMENT (OUTPATIENT)
Age: 78
End: 2023-07-26

## 2023-07-26 ENCOUNTER — APPOINTMENT (OUTPATIENT)
Dept: NEUROLOGY | Facility: CLINIC | Age: 78
End: 2023-07-26
Payer: MEDICARE

## 2023-07-26 VITALS
BODY MASS INDEX: 15.64 KG/M2 | HEIGHT: 62 IN | HEART RATE: 83 BPM | WEIGHT: 85 LBS | TEMPERATURE: 98.1 F | DIASTOLIC BLOOD PRESSURE: 68 MMHG | OXYGEN SATURATION: 94 % | SYSTOLIC BLOOD PRESSURE: 110 MMHG

## 2023-07-26 DIAGNOSIS — Z82.49 FAMILY HISTORY OF ISCHEMIC HEART DISEASE AND OTHER DISEASES OF THE CIRCULATORY SYSTEM: ICD-10-CM

## 2023-07-26 DIAGNOSIS — Z86.79 PERSONAL HISTORY OF OTHER DISEASES OF THE CIRCULATORY SYSTEM: ICD-10-CM

## 2023-07-26 DIAGNOSIS — Z87.09 PERSONAL HISTORY OF OTHER DISEASES OF THE RESPIRATORY SYSTEM: ICD-10-CM

## 2023-07-26 DIAGNOSIS — Z80.0 FAMILY HISTORY OF MALIGNANT NEOPLASM OF DIGESTIVE ORGANS: ICD-10-CM

## 2023-07-26 DIAGNOSIS — Z86.39 PERSONAL HISTORY OF OTHER ENDOCRINE, NUTRITIONAL AND METABOLIC DISEASE: ICD-10-CM

## 2023-07-26 DIAGNOSIS — Z80.1 FAMILY HISTORY OF MALIGNANT NEOPLASM OF TRACHEA, BRONCHUS AND LUNG: ICD-10-CM

## 2023-07-26 DIAGNOSIS — Z78.9 OTHER SPECIFIED HEALTH STATUS: ICD-10-CM

## 2023-07-26 PROCEDURE — 99205 OFFICE O/P NEW HI 60 MIN: CPT

## 2023-07-26 RX ORDER — BENAZEPRIL HYDROCHLORIDE 10 MG/1
10 TABLET, FILM COATED ORAL
Refills: 0 | Status: ACTIVE | COMMUNITY

## 2023-07-26 RX ORDER — LEVOTHYROXINE SODIUM 0.07 MG/1
75 TABLET ORAL
Refills: 0 | Status: ACTIVE | COMMUNITY

## 2023-07-26 RX ORDER — ASPIRIN 81 MG
81 TABLET, DELAYED RELEASE (ENTERIC COATED) ORAL
Refills: 0 | Status: ACTIVE | COMMUNITY

## 2023-07-26 RX ORDER — ATORVASTATIN CALCIUM 10 MG/1
10 TABLET, FILM COATED ORAL
Refills: 0 | Status: ACTIVE | COMMUNITY

## 2023-07-28 NOTE — DISCUSSION/SUMMARY
[FreeTextEntry1] : Maricruz Mehta is an 78 year-old woman with PMH HTN, hypothyroidism, COPD on O2, dementia with behavioral disturbances and anxiety who presented to the office today for evaluation of dementia with behavioral disturbances and anxiety. CTH performed during recent hospitalization was negative. QTc 470. We discussed psychiatric medication options and the cardiac risks with her existing prolonged QTc. Due to limited options, will begin VPA 125mg QD in AM for anxiety. Will perform lab work to assess LFTs at follow-up. Follow-up with psychiatry and see me again in 2-3 months or sooner should the need arise. All of their questions and concerns were addressed.

## 2023-07-28 NOTE — REVIEW OF SYSTEMS
[Confused or Disoriented] : confusion [Memory Lapses or Loss] : memory loss [Decr. Concentrating Ability] : decreased concentrating ability [Difficulty Walking] : difficulty walking [Anxiety] : anxiety [Change In Personality] : personality change [Emotional Problems] : emotional problems [Shortness Of Breath] : shortness of breath [Fever] : no fever [Chills] : no chills [Facial Weakness] : no facial weakness [Arm Weakness] : no arm weakness [Hand Weakness] : no hand weakness [Leg Weakness] : no leg weakness [Poor Coordination] : good coordination [Numbness] : no numbness [Tingling] : no tingling [Seizures] : no convulsions [Dizziness] : no dizziness [Frequent Falls] : not falling [Depression] : no depression [Eyesight Problems] : no eyesight problems [Loss Of Hearing] : no hearing loss [FreeTextEntry6] : on O2

## 2023-07-28 NOTE — HISTORY OF PRESENT ILLNESS
[FreeTextEntry1] : Maricruz Mehta is an 78 year-old woman with PMH HTN, hypothyroidism, COPD on O2, dementia with behavioral disturbances and anxiety who presents to the office today for hospital follow-up. She is accompanied by her son who provides collateral history with her permission. She was hospitalized at [facility] on 6/27/23 after being found wandering in the street. ECG with TWI in inferior leads, which was new. Cardiology consulted and cardiac enzymes negative. QTc 470. CTH and c-spine were negative. She was started on Xyprexa in the hospital for agitation and discharged home on trazodone for insomnia. Trazodone at bedtime has helped with sleep however according to her son, she continues to have intense anxiety during the day, paces back and forth throughout the house with occasional episodes of paranoia. She has been prescribed memantine, donepezil, risperidone and Haldol in the past but was taken off of these medications by her son as they caused her extreme lethargy. They are on a list to be evaluated by geriatric psychiatry with [facility] but the wait is long. She is supervised 24-7 at home and lives with her son. He is hoping to start her on medication to help her with daytime anxiety. \par

## 2023-07-28 NOTE — PHYSICAL EXAM
[FreeTextEntry1] : GENERAL PHYSICAL EXAM:\par GEN: thin, no distress\par EYES: sclera white, conjunctiva clear, no nystagmus\par PULM: on portable O2\par EXT: no edema, no cyanosis\par MSK: muscle tone and strength normal\par SKIN: warm, dry, large area of ecchymosis to left upper arm\par \par NEUROLOGICAL EXAM:\par Mental Status\par Orientation: alert, disoriented to time and place\par Language: clear and fluent, intact comprehension and repetition\par \par Cranial Nerves\par II: visual fields full to confrontation \par III, IV, VI: PERRL, EOMI\par V, VII: facial sensation and movement intact and symmetric \par VIII: hearing intact \par IX, X: uvula midline, soft palate elevates normally \par XI: BL shoulder shrug intact \par XII: tongue midline\par \par Motor\par Shoulder abd: 5 (R), 5 (L)\par EF/EE: 5 (R), 5 (L)\par WF/WE: 5 (R), 5 (L)\par hand : 5 (R), 5 (L)\par HF/HE: 5 (R), 5 (L)\par KF/KE: 5 (R), 5 (L)\par DF/PF: 5 (R), 5 (L) \par Tone and bulk are normal in upper and lower limbs\par No pronator drift\par \par Sensation\par Intact to light touch in all 4 EXTs\par \par Reflex\par 2+ in BL biceps, brachioradialis, patella\par \par Coordination\par Normal FTN bilaterally\par Able to perform rapid, alternating movements\par \par Gait\par Unsteady gait

## 2023-08-30 RX ORDER — DIVALPROEX SODIUM 250 MG/1
250 TABLET, DELAYED RELEASE ORAL TWICE DAILY
Qty: 180 | Refills: 0 | Status: ACTIVE | COMMUNITY
Start: 2023-07-28 | End: 1900-01-01

## 2023-09-27 ENCOUNTER — APPOINTMENT (OUTPATIENT)
Dept: NEUROLOGY | Facility: CLINIC | Age: 78
End: 2023-09-27
Payer: MEDICARE

## 2023-09-27 VITALS
HEIGHT: 62 IN | OXYGEN SATURATION: 96 % | HEART RATE: 74 BPM | WEIGHT: 89.5 LBS | BODY MASS INDEX: 16.47 KG/M2 | DIASTOLIC BLOOD PRESSURE: 75 MMHG | TEMPERATURE: 98.2 F | SYSTOLIC BLOOD PRESSURE: 113 MMHG

## 2023-09-27 DIAGNOSIS — F03.94 UNSPECIFIED DEMENTIA, UNSPECIFIED SEVERITY, WITH ANXIETY: ICD-10-CM

## 2023-09-27 PROCEDURE — 99215 OFFICE O/P EST HI 40 MIN: CPT

## 2023-10-12 PROBLEM — F03.94 DEMENTIA WITH ANXIETY: Status: ACTIVE | Noted: 2023-07-26

## 2023-10-27 RX ORDER — TRAZODONE HYDROCHLORIDE 50 MG/1
50 TABLET ORAL
Qty: 30 | Refills: 2 | Status: ACTIVE | COMMUNITY
Start: 1900-01-01 | End: 1900-01-01

## 2023-10-30 NOTE — BH CONSULTATION LIAISON ASSESSMENT NOTE - CURRENT PLAN:
None known Cantharidin Counseling:  I discussed with the patient the risks of Cantharidin including but not limited to pain, redness, burning, itching, and blistering.

## 2023-11-09 RX ORDER — MIRTAZAPINE 7.5 MG/1
7.5 TABLET, FILM COATED ORAL
Qty: 30 | Refills: 2 | Status: ACTIVE | COMMUNITY
Start: 2023-10-12 | End: 1900-01-01

## 2023-12-29 NOTE — BH CONSULTATION LIAISON ASSESSMENT NOTE - NSBHCHARTREVIEWINVESTIGATE_PSY_A_CORE FT
4 = No assist / stand by assistance < from: 12 Lead ECG (06.28.23 @ 08:14) >      Ventricular Rate 72 BPM    Atrial Rate 72 BPM    P-R Interval 102 ms    QRS Duration 84 ms    Q-T Interval 432 ms    QTC Calculation(Bazett) 473 ms    P Axis 85 degrees    R Axis 72 degrees    T Axis 126 degrees    Diagnosis Line Sinus rhythm with short [place]  Voltage criteria for left ventricular hypertrophy  ST & Marked T wave abnormality, consider anterolateral ischemia  Prolonged QT  Abnormal ECG    Confirmed by Feliz Flowers ([id number]) on 6/28/2023 10:16:32 AM    < end of copied text >

## 2024-11-01 NOTE — ED PROVIDER NOTE - NSCAREINITIATED _GEN_ER
Discharge Instructions    Diet: Diet as tolerated. Drink 8-10 glasses of water daily  Activity: Normal activity         General Instructions    Call your OB doctor if: Fluid leaking from your vagina;Uterine contractions increasing in intensity and frequency;Vaginal bleeding;Vaginal or rectal pressure;Uterine contractions 10 minutes or closer for 1 to 2 hours;Decrease in fetal movement;Temperature greater than 100F                  Geovanna Faustin(Attending)

## 2024-11-23 ENCOUNTER — INPATIENT (INPATIENT)
Facility: HOSPITAL | Age: 79
LOS: 7 days | Discharge: HOSPICE | DRG: 195 | End: 2024-12-01
Attending: HOSPITALIST | Admitting: STUDENT IN AN ORGANIZED HEALTH CARE EDUCATION/TRAINING PROGRAM
Payer: MEDICARE

## 2024-11-23 VITALS
TEMPERATURE: 98 F | HEART RATE: 100 BPM | DIASTOLIC BLOOD PRESSURE: 77 MMHG | SYSTOLIC BLOOD PRESSURE: 107 MMHG | RESPIRATION RATE: 28 BRPM

## 2024-11-23 DIAGNOSIS — J18.9 PNEUMONIA, UNSPECIFIED ORGANISM: ICD-10-CM

## 2024-11-23 DIAGNOSIS — Z98.890 OTHER SPECIFIED POSTPROCEDURAL STATES: Chronic | ICD-10-CM

## 2024-11-23 LAB
ACANTHOCYTES BLD QL SMEAR: SLIGHT — SIGNIFICANT CHANGE UP
ALBUMIN SERPL ELPH-MCNC: 3.8 G/DL — SIGNIFICANT CHANGE UP (ref 3.3–5.2)
ALP SERPL-CCNC: 150 U/L — SIGNIFICANT CHANGE UP (ref 60–320)
ALT FLD-CCNC: 10 U/L — SIGNIFICANT CHANGE UP
ANION GAP SERPL CALC-SCNC: 10 MMOL/L — SIGNIFICANT CHANGE UP (ref 5–17)
AST SERPL-CCNC: 18 U/L — SIGNIFICANT CHANGE UP
BASOPHILS # BLD AUTO: 0.13 K/UL — SIGNIFICANT CHANGE UP (ref 0–0.2)
BASOPHILS NFR BLD AUTO: 0.9 % — SIGNIFICANT CHANGE UP (ref 0–2)
BILIRUB SERPL-MCNC: 0.4 MG/DL — SIGNIFICANT CHANGE UP (ref 0.4–10.5)
BUN SERPL-MCNC: 32.7 MG/DL — HIGH (ref 8–20)
CALCIUM SERPL-MCNC: 12.5 MG/DL — HIGH (ref 8.4–10.5)
CHLORIDE SERPL-SCNC: 102 MMOL/L — SIGNIFICANT CHANGE UP (ref 96–108)
CO2 SERPL-SCNC: 32 MMOL/L — HIGH (ref 22–29)
CREAT SERPL-MCNC: 0.89 MG/DL — HIGH (ref 0.2–0.7)
EGFR: SIGNIFICANT CHANGE UP ML/MIN/1.73M2
ELLIPTOCYTES BLD QL SMEAR: SLIGHT — SIGNIFICANT CHANGE UP
EOSINOPHIL # BLD AUTO: 0 K/UL — LOW (ref 0.1–1.1)
EOSINOPHIL NFR BLD AUTO: 0 % — SIGNIFICANT CHANGE UP (ref 0–4)
FLUAV AG NPH QL: SIGNIFICANT CHANGE UP
FLUBV AG NPH QL: SIGNIFICANT CHANGE UP
GAS PNL BLDV: SIGNIFICANT CHANGE UP
GIANT PLATELETS BLD QL SMEAR: PRESENT — SIGNIFICANT CHANGE UP
GLUCOSE SERPL-MCNC: 168 MG/DL — HIGH (ref 70–99)
HCT VFR BLD CALC: 28.5 % — LOW (ref 34.5–45)
HGB BLD-MCNC: 9 G/DL — LOW (ref 11.5–15.5)
HYPOCHROMIA BLD QL: SLIGHT — SIGNIFICANT CHANGE UP
LYMPHOCYTES # BLD AUTO: 0 % — LOW (ref 16–47)
LYMPHOCYTES # BLD AUTO: 0 K/UL — LOW (ref 2–11)
MANUAL SMEAR VERIFICATION: SIGNIFICANT CHANGE UP
MCHC RBC-ENTMCNC: 30.1 PG — SIGNIFICANT CHANGE UP (ref 27–34)
MCHC RBC-ENTMCNC: 31.6 G/DL — LOW (ref 32–36)
MCV RBC AUTO: 95.3 FL — SIGNIFICANT CHANGE UP (ref 80–100)
MONOCYTES # BLD AUTO: 0.86 K/UL — SIGNIFICANT CHANGE UP (ref 0.3–2.7)
MONOCYTES NFR BLD AUTO: 6.1 % — SIGNIFICANT CHANGE UP (ref 2–8)
MYELOCYTES NFR BLD: 1.8 % — HIGH (ref 0–0)
NEUTROPHILS # BLD AUTO: 12.45 K/UL — SIGNIFICANT CHANGE UP (ref 6–20)
NEUTROPHILS NFR BLD AUTO: 88.6 % — HIGH (ref 43–77)
NT-PROBNP SERPL-SCNC: 2379 PG/ML — HIGH (ref 0–300)
OVALOCYTES BLD QL SMEAR: SLIGHT — SIGNIFICANT CHANGE UP
PLAT MORPH BLD: NORMAL — SIGNIFICANT CHANGE UP
PLATELET # BLD AUTO: 251 K/UL — SIGNIFICANT CHANGE UP (ref 150–400)
POIKILOCYTOSIS BLD QL AUTO: SIGNIFICANT CHANGE UP
POLYCHROMASIA BLD QL SMEAR: SLIGHT — SIGNIFICANT CHANGE UP
POTASSIUM SERPL-MCNC: 4 MMOL/L — SIGNIFICANT CHANGE UP (ref 3.5–5.3)
POTASSIUM SERPL-SCNC: 4 MMOL/L — SIGNIFICANT CHANGE UP (ref 3.5–5.3)
PROT SERPL-MCNC: 6.3 G/DL — LOW (ref 6.6–8.7)
RAPID RVP RESULT: SIGNIFICANT CHANGE UP
RBC # BLD: 2.99 M/UL — LOW (ref 3.8–5.2)
RBC # FLD: 13.6 % — SIGNIFICANT CHANGE UP (ref 10.3–14.5)
RBC BLD AUTO: ABNORMAL
RSV RNA NPH QL NAA+NON-PROBE: SIGNIFICANT CHANGE UP
SARS-COV-2 RNA SPEC QL NAA+PROBE: SIGNIFICANT CHANGE UP
SARS-COV-2 RNA SPEC QL NAA+PROBE: SIGNIFICANT CHANGE UP
SODIUM SERPL-SCNC: 144 MMOL/L — SIGNIFICANT CHANGE UP (ref 135–145)
VARIANT LYMPHS # BLD: 2.6 % — SIGNIFICANT CHANGE UP (ref 0–6)
WBC # BLD: 14.05 K/UL — HIGH (ref 3.8–10.5)
WBC # FLD AUTO: 14.05 K/UL — HIGH (ref 3.8–10.5)

## 2024-11-23 PROCEDURE — 99223 1ST HOSP IP/OBS HIGH 75: CPT

## 2024-11-23 PROCEDURE — 99291 CRITICAL CARE FIRST HOUR: CPT

## 2024-11-23 PROCEDURE — 71045 X-RAY EXAM CHEST 1 VIEW: CPT | Mod: 26

## 2024-11-23 RX ORDER — SODIUM CHLORIDE 9 MG/ML
1000 INJECTION, SOLUTION INTRAMUSCULAR; INTRAVENOUS; SUBCUTANEOUS
Refills: 0 | Status: DISCONTINUED | OUTPATIENT
Start: 2024-11-23 | End: 2024-11-24

## 2024-11-23 RX ORDER — METHYLPREDNISOLONE SOD SUCC 125 MG
125 VIAL (EA) INJECTION ONCE
Refills: 0 | Status: COMPLETED | OUTPATIENT
Start: 2024-11-23 | End: 2024-11-23

## 2024-11-23 RX ORDER — IPRATROPIUM BROMIDE AND ALBUTEROL SULFATE 2.5; .5 MG/3ML; MG/3ML
3 SOLUTION RESPIRATORY (INHALATION) EVERY 6 HOURS
Refills: 0 | Status: DISCONTINUED | OUTPATIENT
Start: 2024-11-23 | End: 2024-11-30

## 2024-11-23 RX ORDER — LEVOTHYROXINE SODIUM 150 MCG
75 TABLET ORAL DAILY
Refills: 0 | Status: DISCONTINUED | OUTPATIENT
Start: 2024-11-23 | End: 2024-12-01

## 2024-11-23 RX ORDER — ENOXAPARIN SODIUM 30 MG/.3ML
30 INJECTION SUBCUTANEOUS EVERY 24 HOURS
Refills: 0 | Status: DISCONTINUED | OUTPATIENT
Start: 2024-11-23 | End: 2024-11-29

## 2024-11-23 RX ORDER — IPRATROPIUM BROMIDE AND ALBUTEROL SULFATE 2.5; .5 MG/3ML; MG/3ML
3 SOLUTION RESPIRATORY (INHALATION)
Refills: 0 | Status: DISCONTINUED | OUTPATIENT
Start: 2024-11-23 | End: 2024-11-23

## 2024-11-23 RX ORDER — AZITHROMYCIN 250 MG/1
500 TABLET, FILM COATED ORAL EVERY 24 HOURS
Refills: 0 | Status: COMPLETED | OUTPATIENT
Start: 2024-11-23 | End: 2024-11-26

## 2024-11-23 RX ORDER — AZITHROMYCIN 250 MG/1
500 TABLET, FILM COATED ORAL ONCE
Refills: 0 | Status: COMPLETED | OUTPATIENT
Start: 2024-11-23 | End: 2024-11-23

## 2024-11-23 RX ORDER — CEFTRIAXONE SODIUM 1 G
1000 VIAL (EA) INJECTION ONCE
Refills: 0 | Status: COMPLETED | OUTPATIENT
Start: 2024-11-23 | End: 2024-11-23

## 2024-11-23 RX ORDER — CEFTRIAXONE SODIUM 1 G
1000 VIAL (EA) INJECTION EVERY 24 HOURS
Refills: 0 | Status: DISCONTINUED | OUTPATIENT
Start: 2024-11-24 | End: 2024-11-26

## 2024-11-23 RX ORDER — LORAZEPAM 2 MG/1
1 TABLET ORAL THREE TIMES A DAY
Refills: 0 | Status: DISCONTINUED | OUTPATIENT
Start: 2024-11-23 | End: 2024-11-30

## 2024-11-23 RX ORDER — METHYLPREDNISOLONE SOD SUCC 125 MG
40 VIAL (EA) INJECTION
Refills: 0 | Status: DISCONTINUED | OUTPATIENT
Start: 2024-11-23 | End: 2024-11-24

## 2024-11-23 RX ADMIN — Medication 125 MILLIGRAM(S): at 14:53

## 2024-11-23 RX ADMIN — Medication 40 MILLIGRAM(S): at 22:08

## 2024-11-23 RX ADMIN — IPRATROPIUM BROMIDE AND ALBUTEROL SULFATE 3 MILLILITER(S): 2.5; .5 SOLUTION RESPIRATORY (INHALATION) at 20:51

## 2024-11-23 RX ADMIN — AZITHROMYCIN 255 MILLIGRAM(S): 250 TABLET, FILM COATED ORAL at 15:15

## 2024-11-23 RX ADMIN — Medication 10 MILLIGRAM(S): at 22:08

## 2024-11-23 RX ADMIN — IPRATROPIUM BROMIDE AND ALBUTEROL SULFATE 3 MILLILITER(S): 2.5; .5 SOLUTION RESPIRATORY (INHALATION) at 15:02

## 2024-11-23 RX ADMIN — Medication 1000 MILLIGRAM(S): at 15:15

## 2024-11-23 NOTE — PATIENT PROFILE ADULT - SAFE PLACE TO LIVE
Maria Luisa is a 34 year old year old female here for an OB check.  She is      .  Gestational Age: 24w0d.  Concerns today include: HTN and left hip pain.    She reports fetal movement.   She denies bleeding.  She denies cramping.  She reports nausea.  She denies breast tenderness.        If your visit includes an exam today, would you like an assistant to be present in the room during that time?no    [employer] application verified/signed up YES.   Pt informed that [employer] is loaded with  provider- approved content, customized resources, and weekly tasks and tips    Patient is currently part of the covid-19 OB modified schedule: NO     no

## 2024-11-23 NOTE — ED ADULT TRIAGE NOTE - CHIEF COMPLAINT QUOTE
hx of copd and dementia, found to be saturating 50% on room air, reached 70% on 15L NRB. placed in critical care, dr kan called for eval.

## 2024-11-23 NOTE — H&P ADULT - NSHPPHYSICALEXAM_GEN_ALL_CORE
VITALS:   T(C): 36.6 (11-23-24 @ 13:29), Max: 36.6 (11-23-24 @ 13:29)  HR: 92 (11-23-24 @ 16:11) (92 - 100)  BP: 102/64 (11-23-24 @ 16:11) (102/64 - 107/77)  RR: 26 (11-23-24 @ 16:11) (26 - 28)  SpO2: 100% (11-23-24 @ 16:11) (100% - 100%)    GENERAL: Anxious, on NC  HEAD:  Atraumatic, Normocephalic  CHEST/LUNG: Tachypnea, mild exp wheezes and diffuse rales noted, poor air entry   HEART: tachycardiac, no LE edema  ABDOMEN: BSx4;  nondistended  NERVOUS SYSTEM:  A&Ox2 (hospital and name), confused otherwise

## 2024-11-23 NOTE — ED ADULT NURSE NOTE - OBJECTIVE STATEMENT
Patient presents to ER C/O shortness of breath, patient tachypneic, EMS administered O2 via non rebreather, afebrile, patient is O2 dependant at home, HX of COPD and CHF, patient awake and alert, placed in CC for close evaluation.

## 2024-11-23 NOTE — ED PROVIDER NOTE - ATTENDING CONTRIBUTION TO CARE
COPD hx with PRN oxygen at home now with acute hypoxic respiratory failure, SpO2 70s per EMS, diminished throughout on exam with increased WOB. VBG with compensated hypercapnia. Trialed bilevel for WOB but very small oxygen requirement. CXR with multifocal infiltrates suggestive of pneumonia, started empiric abx at [facility] organisms and will require admission for management.

## 2024-11-23 NOTE — H&P ADULT - ASSESSMENT
78yo F PMHx dementia, COPD on home PRN O2, hypothyroidism, and HTN p/w SOB, admitted for workup for acute on chronic resp failure with hypoxia.     #Acute on chronic resp failure 2/2 combiantion of COPD exac, possible super imposed PNA  initial hypoxia needed NRB, now improved on NC  BNP 2379  f/u CXR read  f/u formal CT angio  c/w Azithro and CTX in the meantime pending CT chest  f/u urine legionella, RVP panel  Pt with wheezing on exam, start solumedrol BID  Duoneb ATC  TTE pending     #Hypercalcemia  unclear etiology, also with elevated AP  check PTH, PTH related peptite, vitamin D level  start mIVF while NPO    #HTN  Hold home BP as BP soft, restart meds as needed    #Hypothyroidism  c/w home synthroid    #Dementia  c/w home PRN Ativan for agitation     DVT ppx: Lovenox QD  Diet: NPO pending dysphagia screen  Dispo: Tele/

## 2024-11-23 NOTE — H&P ADULT - NSHPLABSRESULTS_GEN_ALL_CORE
9.0    14.05 )-----------( 251      ( 23 Nov 2024 14:20 )             28.5       11-23    144  |  102  |  32.7[H]  ----------------------------<  168[H]  4.0   |  32.0[H]  |  0.89    Ca    12.5[H]      23 Nov 2024 14:20    TPro  6.3[L]  /  Alb  3.8  /  TBili  0.4  /  DBili  x   /  AST  18  /  ALT  10  /  AlkPhos  150[H]  11-23              Urinalysis Basic - ( 23 Nov 2024 14:20 )    Color: x / Appearance: x / SG: x / pH: x  Gluc: 168 mg/dL / Ketone: x  / Bili: x / Urobili: x   Blood: x / Protein: x / Nitrite: x   Leuk Esterase: x / RBC: x / WBC x   Sq Epi: x / Non Sq Epi: x / Bacteria: x            Lactate Trend            CAPILLARY BLOOD GLUCOSE

## 2024-11-23 NOTE — H&P ADULT - HISTORY OF PRESENT ILLNESS
78yo F PMHx dementia, COPD on home PRN O2, hypothyroidism, and HTN p/w SOB. Pt poor historian, history partly obtained from grandson on the phone. Unable to reach patient's son Quentin ([phone]). Per Grandson, pt was found by yamile with increased WOB at home earlier today, noted by EMS to be desatting to the 70s on RA and placed on O2 and came in the further eval. Per chart review, ROS negative for fever, chill, CP, nv/cd nor urinary concerns.   ED vitals notable with hypoxia, initially on NRB, now weaned to NC, labs with WBC 14, BNP 2379, Ca 12.5 s/p Solumedrol, CTX and Azithro, admitted for further care.

## 2024-11-23 NOTE — ED PROVIDER NOTE - CLINICAL SUMMARY MEDICAL DECISION MAKING FREE TEXT BOX
79 year old female with a history of dementia, hypertension, COPD not on O2 at home BIBEMS after being found hypoxic at home. Per EMS, pt was SpO2 50s at home, 75% on NRB and tachypneic. Denies HA, fevers, chills, falls, trauma, LOC, CP, abd pain, n/v/d, denies urinary sxs.    General: Awake, alert, tachypneic   HEENT: Normocephalic, atraumatic. No scleral icterus or conjunctival injection. EOMI. Moist mucous membranes. Oropharynx clear.   Neck:. Soft and supple.  Cardiac: RRR, Peripheral pulses 2+ and symmetric. No LE edema.  Resp: tachypneic, Rhonchi b/l, +accessory muscle use  Abd: Soft, non-tender, non-distended. No guarding, rebound, or rigidity.  Back: Spine midline and non-tender.   Skin: No rashes, abrasions, or lacerations.  Neuro: AO x 2. Moves all extremities symmetrically. Motor strength and sensation grossly intact.    COPD exacerbation. Placed on BIPAP, will administer steroids, nebs, Mg, will obtain labs and imaging.

## 2024-11-23 NOTE — ED PROVIDER NOTE - PROGRESS NOTE DETAILS
MD Terry: Meds List: benazepril 10 QD, atorvastatin 10, levothyroxine 75, lorazepam 1mg TID PRN, aspirin 81,

## 2024-11-24 LAB
24R-OH-CALCIDIOL SERPL-MCNC: 39.1 NG/ML — SIGNIFICANT CHANGE UP (ref 30–80)
ALBUMIN SERPL ELPH-MCNC: 3.5 G/DL — SIGNIFICANT CHANGE UP (ref 3.3–5.2)
ALP SERPL-CCNC: 132 U/L — HIGH (ref 40–120)
ALT FLD-CCNC: 9 U/L — SIGNIFICANT CHANGE UP
ANION GAP SERPL CALC-SCNC: 8 MMOL/L — SIGNIFICANT CHANGE UP (ref 5–17)
AST SERPL-CCNC: 13 U/L — SIGNIFICANT CHANGE UP
BILIRUB SERPL-MCNC: 0.2 MG/DL — LOW (ref 0.4–2)
BUN SERPL-MCNC: 30.1 MG/DL — HIGH (ref 8–20)
CALCIUM SERPL-MCNC: 11.9 MG/DL — HIGH (ref 8.4–10.5)
CALCIUM SERPL-MCNC: 11.9 MG/DL — HIGH (ref 8.4–10.5)
CHLORIDE SERPL-SCNC: 103 MMOL/L — SIGNIFICANT CHANGE UP (ref 96–108)
CO2 SERPL-SCNC: 30 MMOL/L — HIGH (ref 22–29)
CREAT SERPL-MCNC: 0.83 MG/DL — SIGNIFICANT CHANGE UP (ref 0.5–1.3)
EGFR: 71 ML/MIN/1.73M2 — SIGNIFICANT CHANGE UP
GLUCOSE SERPL-MCNC: 168 MG/DL — HIGH (ref 70–99)
HCT VFR BLD CALC: 27 % — LOW (ref 34.5–45)
HGB BLD-MCNC: 8.5 G/DL — LOW (ref 11.5–15.5)
LACTATE SERPL-SCNC: 1.5 MMOL/L — SIGNIFICANT CHANGE UP (ref 0.5–2)
MCHC RBC-ENTMCNC: 29.6 PG — SIGNIFICANT CHANGE UP (ref 27–34)
MCHC RBC-ENTMCNC: 31.5 G/DL — LOW (ref 32–36)
MCV RBC AUTO: 94.1 FL — SIGNIFICANT CHANGE UP (ref 80–100)
PLATELET # BLD AUTO: 204 K/UL — SIGNIFICANT CHANGE UP (ref 150–400)
POTASSIUM SERPL-MCNC: 4.8 MMOL/L — SIGNIFICANT CHANGE UP (ref 3.5–5.3)
POTASSIUM SERPL-SCNC: 4.8 MMOL/L — SIGNIFICANT CHANGE UP (ref 3.5–5.3)
PROT SERPL-MCNC: 6 G/DL — LOW (ref 6.6–8.7)
PTH-INTACT FLD-MCNC: 18 PG/ML — SIGNIFICANT CHANGE UP (ref 15–65)
RBC # BLD: 2.87 M/UL — LOW (ref 3.8–5.2)
RBC # FLD: 13.4 % — SIGNIFICANT CHANGE UP (ref 10.3–14.5)
SODIUM SERPL-SCNC: 141 MMOL/L — SIGNIFICANT CHANGE UP (ref 135–145)
VIT D25+D1,25 OH+D1,25 PNL SERPL-MCNC: 28.3 PG/ML — SIGNIFICANT CHANGE UP (ref 19.9–79.3)
WBC # BLD: 11.07 K/UL — HIGH (ref 3.8–10.5)
WBC # FLD AUTO: 11.07 K/UL — HIGH (ref 3.8–10.5)

## 2024-11-24 PROCEDURE — 71275 CT ANGIOGRAPHY CHEST: CPT | Mod: 26

## 2024-11-24 PROCEDURE — 99232 SBSQ HOSP IP/OBS MODERATE 35: CPT

## 2024-11-24 PROCEDURE — 93010 ELECTROCARDIOGRAM REPORT: CPT

## 2024-11-24 RX ORDER — OLANZAPINE 20 MG/1
2.5 TABLET ORAL ONCE
Refills: 0 | Status: COMPLETED | OUTPATIENT
Start: 2024-11-24 | End: 2024-11-24

## 2024-11-24 RX ORDER — SODIUM CHLORIDE 9 MG/ML
1000 INJECTION, SOLUTION INTRAMUSCULAR; INTRAVENOUS; SUBCUTANEOUS
Refills: 0 | Status: DISCONTINUED | OUTPATIENT
Start: 2024-11-24 | End: 2024-11-25

## 2024-11-24 RX ORDER — METHYLPREDNISOLONE SOD SUCC 125 MG
40 VIAL (EA) INJECTION DAILY
Refills: 0 | Status: DISCONTINUED | OUTPATIENT
Start: 2024-11-24 | End: 2024-11-26

## 2024-11-24 RX ORDER — TRAZODONE HYDROCHLORIDE 150 MG/1
50 TABLET ORAL AT BEDTIME
Refills: 0 | Status: DISCONTINUED | OUTPATIENT
Start: 2024-11-24 | End: 2024-12-01

## 2024-11-24 RX ADMIN — Medication 1000 MILLIGRAM(S): at 14:40

## 2024-11-24 RX ADMIN — IPRATROPIUM BROMIDE AND ALBUTEROL SULFATE 3 MILLILITER(S): 2.5; .5 SOLUTION RESPIRATORY (INHALATION) at 08:33

## 2024-11-24 RX ADMIN — Medication 40 MILLIGRAM(S): at 14:40

## 2024-11-24 RX ADMIN — Medication 75 MICROGRAM(S): at 06:38

## 2024-11-24 RX ADMIN — AZITHROMYCIN 255 MILLIGRAM(S): 250 TABLET, FILM COATED ORAL at 06:39

## 2024-11-24 RX ADMIN — SODIUM CHLORIDE 50 MILLILITER(S): 9 INJECTION, SOLUTION INTRAMUSCULAR; INTRAVENOUS; SUBCUTANEOUS at 14:40

## 2024-11-24 RX ADMIN — IPRATROPIUM BROMIDE AND ALBUTEROL SULFATE 3 MILLILITER(S): 2.5; .5 SOLUTION RESPIRATORY (INHALATION) at 15:13

## 2024-11-24 RX ADMIN — ENOXAPARIN SODIUM 30 MILLIGRAM(S): 30 INJECTION SUBCUTANEOUS at 18:10

## 2024-11-24 RX ADMIN — Medication 81 MILLIGRAM(S): at 18:11

## 2024-11-24 RX ADMIN — Medication 40 MILLIGRAM(S): at 06:38

## 2024-11-24 RX ADMIN — OLANZAPINE 2.5 MILLIGRAM(S): 20 TABLET ORAL at 22:02

## 2024-11-24 RX ADMIN — IPRATROPIUM BROMIDE AND ALBUTEROL SULFATE 3 MILLILITER(S): 2.5; .5 SOLUTION RESPIRATORY (INHALATION) at 20:31

## 2024-11-24 NOTE — PROGRESS NOTE ADULT - ASSESSMENT
78yo F PMHx dementia, COPD on home PRN O2, hypothyroidism, and HTN p/w SOB, admitted for workup for acute on chronic resp failure with hypoxia.     #Acute on chronic resp failure 2/2 combination of COPD exac, possible super imposed PNA  initial hypoxia needed NRB, now improved on NC  BNP 2379  CXR neg, CT noted with bronchial thickening and LORI opacity  f/u repeat CT chest in 2 months to ensure resolution of LORI opacity  c/w Azithro and CTX   NPO, pending swallow eval  f/u urine legionella  RVP panel neg  Pt with wheezing on exam, start solumedrol QD, and Duoneb ATC  TTE pending     #Hypercalcemia  unclear etiology, also with elevated AP, r/o malignancy   PTH and vitamin D level normal  PTH related peptide pending   mIVF while NPO pending swallow eval     #HTN  Hold home BP as BP soft, restart meds as needed    #Hypothyroidism  c/w home synthroid    #Dementia  c/w home PRN Ativan for agitation     DVT ppx: Lovenox QD  Diet: NPO pending dysphagia screen  Dispo: Tele/   80yo F PMHx dementia, COPD on home PRN O2, hypothyroidism, and HTN p/w SOB, admitted for workup for acute on chronic resp failure with hypoxia.     #Acute on chronic resp failure 2/2 combination of COPD exac, possible super imposed PNA  initial hypoxia needed NRB, now improved on NC  BNP 2379  CXR neg, CT noted with bronchial thickening and LORI opacity  f/u repeat CT chest in 2 months to ensure resolution of LORI opacity  c/w Azithro and CTX   NPO, pending swallow eval  f/u urine legionella  RVP panel neg  Slumedrol QD and Duoneb ATC  TTE pending     #Hypercalcemia  unclear etiology, also with elevated AP, r/o malignancy   PTH and vitamin D level normal  PTH related peptide pending   mIVF while NPO pending swallow eval     #HTN  Hold home BP as BP soft, restart meds as needed    #Hypothyroidism  c/w home synthroid    #Dementia  c/w home PRN Ativan for agitation     DVT ppx: Lovenox QD  Diet: NPO pending dysphagia screen  Dispo: Tele/   78yo F PMHx dementia, COPD on home PRN O2, hypothyroidism, and HTN p/w SOB, admitted for workup for acute on chronic resp failure with hypoxia.     #Acute on chronic resp failure 2/2 combination of COPD exac, possible super imposed PNA  initial hypoxia needed NRB, now improved on NC  BNP 2379  CXR neg, CT noted with bronchial thickening and LORI opacity  f/u repeat CT chest in 2 months to ensure resolution of LORI opacity  c/w Azithro and CTX   NPO, pending swallow eval  f/u urine legionella  RVP panel neg  Slumedrol QD and Duoneb ATC  TTE pending     #Hypercalcemia  unclear etiology, also with elevated AP, r/o malignancy   PTH and vitamin D level normal  PTH related peptide pending   mIVF while NPO pending swallow eval     #HTN  Hold home BP as BP soft, restart meds as needed    #Hypothyroidism  c/w home synthroid    #Dementia  c/w home PRN Ativan for agitation     DVT ppx: Lovenox QD  Diet: NPO pending swallow eval  Dispo: Tele/   78yo F PMHx dementia, COPD on home PRN O2, hypothyroidism, and HTN p/w SOB, admitted for workup for acute on chronic resp failure with hypoxia.     #Acute on chronic resp failure 2/2 combination of COPD exac, possible super imposed PNA  initial hypoxia needed NRB, now improved on NC  BNP 2379  CXR neg, CT noted with bronchial thickening and LORI opacity  f/u repeat CT chest in 2 months to ensure resolution of LORI opacity  c/w Azithro and CTX   NPO, pending swallow eval  f/u urine legionella  RVP panel neg  Slumedrol QD and Duoneb ATC  TTE pending     #Hypercalcemia  unclear etiology, also with elevated AP, r/o malignancy   PTH and vitamin D level normal  PTH related peptide pending   mIVF while NPO pending swallow eval     #HTN  Hold home BP as BP soft, restart meds as needed    #Hypothyroidism  c/w home synthroid    #Dementia  c/w home PRN Ativan for agitation     DVT ppx: Lovenox QD  Diet: NPO pending swallow eval  Dispo: Tele/    Code status: FULL

## 2024-11-24 NOTE — PROVIDER CONTACT NOTE (OTHER) - SITUATION
Pt noted with cough and wheezing 1 hour post breakfast
pt is NPO pending speech and swallow evaluation, pt has PO meds due at 2200.    pt is refusing tele and  monitoring despite repeated attempts by RN to apply tele and

## 2024-11-24 NOTE — PROGRESS NOTE ADULT - SUBJECTIVE AND OBJECTIVE BOX
Eric Arango M.D.    Patient is a 79y old  Female who presents with a chief complaint of Hypoxia (23 Nov 2024 16:45)      SUBJECTIVE / OVERNIGHT EVENTS:    Patient denies chest pain, SOB, abd pain, N/V, fever, chills, dysuria or any other complaints. All remainder ROS negative.     MEDICATIONS  (STANDING):  albuterol/ipratropium for Nebulization 3 milliLiter(s) Nebulizer every 6 hours  aspirin enteric coated 81 milliGRAM(s) Oral daily  atorvastatin 10 milliGRAM(s) Oral at bedtime  azithromycin  IVPB 500 milliGRAM(s) IV Intermittent every 24 hours  cefTRIAXone Injectable. 1000 milliGRAM(s) IV Push every 24 hours  enoxaparin Injectable 30 milliGRAM(s) SubCutaneous every 24 hours  levothyroxine 75 MICROGram(s) Oral daily  methylPREDNISolone sodium succinate Injectable 40 milliGRAM(s) IV Push daily    MEDICATIONS  (PRN):  LORazepam     Tablet 1 milliGRAM(s) Oral three times a day PRN for anxiety      I&O's Summary      PHYSICAL EXAM:  Vital Signs Last 24 Hrs  T(C): 36.9 (24 Nov 2024 11:37), Max: 36.9 (23 Nov 2024 18:05)  T(F): 98.5 (24 Nov 2024 11:37), Max: 98.5 (23 Nov 2024 18:05)  HR: 89 (24 Nov 2024 11:37) (76 - 100)  BP: 105/67 (24 Nov 2024 11:37) (98/65 - 110/66)  BP(mean): 80 (23 Nov 2024 18:05) (80 - 80)  RR: 18 (24 Nov 2024 11:37) (18 - 28)  SpO2: 100% (24 Nov 2024 11:37) (97% - 100%)    Parameters below as of 24 Nov 2024 11:37  Patient On (Oxygen Delivery Method): nasal cannula  O2 Flow (L/min): 3      CONSTITUTIONAL: NAD, well-groomed  ENMT: Moist oral mucosa, no pharyngeal injection or exudates; normal dentition  RESPIRATORY: Normal respiratory effort; lungs are clear to auscultation bilaterally  CARDIOVASCULAR: Regular rate and rhythm, normal S1 and S2, no murmur/rub/gallop; No lower extremity edema; Peripheral pulses are 2+ bilaterally  ABDOMEN: Nontender to palpation, normoactive bowel sounds, no rebound/guarding; No hepatosplenomegaly  MUSCLOSKELETAL:  Normal gait; no clubbing or cyanosis of digits; no joint swelling or tenderness to palpation  PSYCH: A+O x3; affect appropriate  NEUROLOGY: CN 2-12 are intact and symmetric; no gross sensory deficits;   SKIN: No rashes; no palpable lesions    LABS:                        8.5    11.07 )-----------( 204      ( 24 Nov 2024 08:59 )             27.0     11-24    141  |  103  |  30.1[H]  ----------------------------<  168[H]  4.8   |  30.0[H]  |  0.83    Ca    11.9[H]      24 Nov 2024 08:59    TPro  6.0[L]  /  Alb  3.5  /  TBili  0.2[L]  /  DBili  x   /  AST  13  /  ALT  9   /  AlkPhos  132[H]  11-24          Urinalysis Basic - ( 24 Nov 2024 08:59 )    Color: x / Appearance: x / SG: x / pH: x  Gluc: 168 mg/dL / Ketone: x  / Bili: x / Urobili: x   Blood: x / Protein: x / Nitrite: x   Leuk Esterase: x / RBC: x / WBC x   Sq Epi: x / Non Sq Epi: x / Bacteria: x        CAPILLARY BLOOD GLUCOSE          RADIOLOGY & ADDITIONAL TESTS:  Results Reviewed:   Imaging Personally Reviewed:  Electrocardiogram Personally Reviewed: Eric Arango M.D.    Patient is a 79y old  Female who presents with a chief complaint of Hypoxia (23 Nov 2024 16:45)      SUBJECTIVE / OVERNIGHT EVENTS: no event overnight. ROS unable to be obtained due to dementia.     MEDICATIONS  (STANDING):  albuterol/ipratropium for Nebulization 3 milliLiter(s) Nebulizer every 6 hours  aspirin enteric coated 81 milliGRAM(s) Oral daily  atorvastatin 10 milliGRAM(s) Oral at bedtime  azithromycin  IVPB 500 milliGRAM(s) IV Intermittent every 24 hours  cefTRIAXone Injectable. 1000 milliGRAM(s) IV Push every 24 hours  enoxaparin Injectable 30 milliGRAM(s) SubCutaneous every 24 hours  levothyroxine 75 MICROGram(s) Oral daily  methylPREDNISolone sodium succinate Injectable 40 milliGRAM(s) IV Push daily    MEDICATIONS  (PRN):  LORazepam     Tablet 1 milliGRAM(s) Oral three times a day PRN for anxiety      I&O's Summary      PHYSICAL EXAM:  Vital Signs Last 24 Hrs  T(C): 36.9 (24 Nov 2024 11:37), Max: 36.9 (23 Nov 2024 18:05)  T(F): 98.5 (24 Nov 2024 11:37), Max: 98.5 (23 Nov 2024 18:05)  HR: 89 (24 Nov 2024 11:37) (76 - 100)  BP: 105/67 (24 Nov 2024 11:37) (98/65 - 110/66)  BP(mean): 80 (23 Nov 2024 18:05) (80 - 80)  RR: 18 (24 Nov 2024 11:37) (18 - 28)  SpO2: 100% (24 Nov 2024 11:37) (97% - 100%)    Parameters below as of 24 Nov 2024 11:37  Patient On (Oxygen Delivery Method): nasal cannula  O2 Flow (L/min): 3    CONSTITUTIONAL: NAD, well-groomed  RESPIRATORY: Poor   CARDIOVASCULAR: Regular rate and rhythm, no LE edema  ABDOMEN: Nontender to palpation, normoactive bowel sounds  PSYCH: A+O x1      LABS:                        8.5    11.07 )-----------( 204      ( 24 Nov 2024 08:59 )             27.0     11-24    141  |  103  |  30.1[H]  ----------------------------<  168[H]  4.8   |  30.0[H]  |  0.83    Ca    11.9[H]      24 Nov 2024 08:59    TPro  6.0[L]  /  Alb  3.5  /  TBili  0.2[L]  /  DBili  x   /  AST  13  /  ALT  9   /  AlkPhos  132[H]  11-24          Urinalysis Basic - ( 24 Nov 2024 08:59 )    Color: x / Appearance: x / SG: x / pH: x  Gluc: 168 mg/dL / Ketone: x  / Bili: x / Urobili: x   Blood: x / Protein: x / Nitrite: x   Leuk Esterase: x / RBC: x / WBC x   Sq Epi: x / Non Sq Epi: x / Bacteria: x        CAPILLARY BLOOD GLUCOSE          RADIOLOGY & ADDITIONAL TESTS:  Results Reviewed:   Imaging Personally Reviewed:  Electrocardiogram Personally Reviewed:

## 2024-11-24 NOTE — PROVIDER CONTACT NOTE (OTHER) - REASON
pt refusing  and Tele, pt due for PO meds but is NPO at this time
Pt noted with new onset cough and wheezing
Hydroxychloroquine Pregnancy And Lactation Text: This medication has been shown to cause fetal harm but it isn't assigned a Pregnancy Risk Category. There are small amounts excreted in breast milk.

## 2024-11-25 PROCEDURE — 99232 SBSQ HOSP IP/OBS MODERATE 35: CPT

## 2024-11-25 RX ORDER — SODIUM CHLORIDE 9 MG/ML
1000 INJECTION, SOLUTION INTRAMUSCULAR; INTRAVENOUS; SUBCUTANEOUS
Refills: 0 | Status: DISCONTINUED | OUTPATIENT
Start: 2024-11-25 | End: 2024-11-26

## 2024-11-25 RX ADMIN — LORAZEPAM 1 MILLIGRAM(S): 2 TABLET ORAL at 19:12

## 2024-11-25 RX ADMIN — Medication 40 MILLIGRAM(S): at 08:29

## 2024-11-25 RX ADMIN — ENOXAPARIN SODIUM 30 MILLIGRAM(S): 30 INJECTION SUBCUTANEOUS at 18:00

## 2024-11-25 RX ADMIN — IPRATROPIUM BROMIDE AND ALBUTEROL SULFATE 3 MILLILITER(S): 2.5; .5 SOLUTION RESPIRATORY (INHALATION) at 23:02

## 2024-11-25 RX ADMIN — Medication 75 MICROGRAM(S): at 05:29

## 2024-11-25 RX ADMIN — AZITHROMYCIN 255 MILLIGRAM(S): 250 TABLET, FILM COATED ORAL at 06:01

## 2024-11-25 RX ADMIN — SODIUM CHLORIDE 50 MILLILITER(S): 9 INJECTION, SOLUTION INTRAMUSCULAR; INTRAVENOUS; SUBCUTANEOUS at 13:21

## 2024-11-25 RX ADMIN — Medication 1000 MILLIGRAM(S): at 13:23

## 2024-11-25 RX ADMIN — SODIUM CHLORIDE 50 MILLILITER(S): 9 INJECTION, SOLUTION INTRAMUSCULAR; INTRAVENOUS; SUBCUTANEOUS at 23:37

## 2024-11-25 RX ADMIN — IPRATROPIUM BROMIDE AND ALBUTEROL SULFATE 3 MILLILITER(S): 2.5; .5 SOLUTION RESPIRATORY (INHALATION) at 08:50

## 2024-11-25 NOTE — SWALLOW BEDSIDE ASSESSMENT ADULT - COMMENTS
As per MD note: "80yo F PMHx dementia, COPD on home PRN O2, hypothyroidism, and HTN p/w SOB, admitted for workup for acute on chronic resp failure with hypoxia."

## 2024-11-25 NOTE — SWALLOW BEDSIDE ASSESSMENT ADULT - SWALLOW EVAL: DIAGNOSIS
Behavioral overlay observed which precluded valid assessment of oral stage of swallow. Pharyngeal stage of swallow therefore not assessed at this time

## 2024-11-25 NOTE — SWALLOW BEDSIDE ASSESSMENT ADULT - ORAL PREPARATORY PHASE
Placed to labial surface: pt expectorated via labial flutter & subsequently tossed spoon across the bed with increased agitation noted/Reduced oral grading

## 2024-11-25 NOTE — SWALLOW BEDSIDE ASSESSMENT ADULT - SLP GENERAL OBSERVATIONS
Pt received & seen seated upright in bed, awake/alert, behavioral overlay with agitation, yelling & cursing, 0/10 pain pre/post

## 2024-11-25 NOTE — PROGRESS NOTE ADULT - SUBJECTIVE AND OBJECTIVE BOX
Eric Arango M.D.    Patient is a 79y old  Female who presents with a chief complaint of Hypoxia (24 Nov 2024 11:47)      SUBJECTIVE / OVERNIGHT EVENTS: no event overnight. ROS unable to be obtained due to dementia.     MEDICATIONS  (STANDING):  albuterol/ipratropium for Nebulization 3 milliLiter(s) Nebulizer every 6 hours  aspirin enteric coated 81 milliGRAM(s) Oral daily  atorvastatin 10 milliGRAM(s) Oral at bedtime  azithromycin  IVPB 500 milliGRAM(s) IV Intermittent every 24 hours  cefTRIAXone Injectable. 1000 milliGRAM(s) IV Push every 24 hours  enoxaparin Injectable 30 milliGRAM(s) SubCutaneous every 24 hours  levothyroxine 75 MICROGram(s) Oral daily  methylPREDNISolone sodium succinate Injectable 40 milliGRAM(s) IV Push daily  sodium chloride 0.9%. 1000 milliLiter(s) (50 mL/Hr) IV Continuous <Continuous>  traZODone 50 milliGRAM(s) Oral at bedtime    MEDICATIONS  (PRN):  LORazepam     Tablet 1 milliGRAM(s) Oral three times a day PRN for anxiety      I&O's Summary      PHYSICAL EXAM:  Vital Signs Last 24 Hrs  T(C): 36 (25 Nov 2024 11:16), Max: 36.8 (24 Nov 2024 15:36)  T(F): 96.8 (25 Nov 2024 11:16), Max: 98.2 (24 Nov 2024 15:36)  HR: 75 (25 Nov 2024 11:16) (67 - 108)  BP: 97/64 (25 Nov 2024 11:16) (93/51 - 99/59)  BP(mean): --  RR: 18 (25 Nov 2024 11:16) (18 - 18)  SpO2: 100% (25 Nov 2024 11:16) (90% - 100%)    Parameters below as of 25 Nov 2024 11:16  Patient On (Oxygen Delivery Method): nasal cannula  O2 Flow (L/min): 3      CONSTITUTIONAL: NAD, well-groomed  ENMT: Moist oral mucosa, no pharyngeal injection or exudates; normal dentition  RESPIRATORY: Normal respiratory effort; lungs are clear to auscultation bilaterally  CARDIOVASCULAR: Regular rate and rhythm, normal S1 and S2, no murmur/rub/gallop; No lower extremity edema; Peripheral pulses are 2+ bilaterally  ABDOMEN: Nontender to palpation, normoactive bowel sounds, no rebound/guarding; No hepatosplenomegaly  MUSCLOSKELETAL:  Normal gait; no clubbing or cyanosis of digits; no joint swelling or tenderness to palpation  PSYCH: A+O x3; affect appropriate  NEUROLOGY: CN 2-12 are intact and symmetric; no gross sensory deficits;   SKIN: No rashes; no palpable lesions    LABS:                        8.5    11.07 )-----------( 204      ( 24 Nov 2024 08:59 )             27.0     11-24    141  |  103  |  30.1[H]  ----------------------------<  168[H]  4.8   |  30.0[H]  |  0.83    Ca    11.9[H]      24 Nov 2024 08:59    TPro  6.0[L]  /  Alb  3.5  /  TBili  0.2[L]  /  DBili  x   /  AST  13  /  ALT  9   /  AlkPhos  132[H]  11-24          Urinalysis Basic - ( 24 Nov 2024 08:59 )    Color: x / Appearance: x / SG: x / pH: x  Gluc: 168 mg/dL / Ketone: x  / Bili: x / Urobili: x   Blood: x / Protein: x / Nitrite: x   Leuk Esterase: x / RBC: x / WBC x   Sq Epi: x / Non Sq Epi: x / Bacteria: x        CAPILLARY BLOOD GLUCOSE          RADIOLOGY & ADDITIONAL TESTS:  Results Reviewed:   Imaging Personally Reviewed:  Electrocardiogram Personally Reviewed: Eric Arango M.D.    Patient is a 79y old  Female who presents with a chief complaint of Hypoxia (24 Nov 2024 11:47)      SUBJECTIVE / OVERNIGHT EVENTS: no event overnight. ROS unable to be obtained due to dementia.     MEDICATIONS  (STANDING):  albuterol/ipratropium for Nebulization 3 milliLiter(s) Nebulizer every 6 hours  aspirin enteric coated 81 milliGRAM(s) Oral daily  atorvastatin 10 milliGRAM(s) Oral at bedtime  azithromycin  IVPB 500 milliGRAM(s) IV Intermittent every 24 hours  cefTRIAXone Injectable. 1000 milliGRAM(s) IV Push every 24 hours  enoxaparin Injectable 30 milliGRAM(s) SubCutaneous every 24 hours  levothyroxine 75 MICROGram(s) Oral daily  methylPREDNISolone sodium succinate Injectable 40 milliGRAM(s) IV Push daily  sodium chloride 0.9%. 1000 milliLiter(s) (50 mL/Hr) IV Continuous <Continuous>  traZODone 50 milliGRAM(s) Oral at bedtime    MEDICATIONS  (PRN):  LORazepam     Tablet 1 milliGRAM(s) Oral three times a day PRN for anxiety      I&O's Summary      PHYSICAL EXAM:  Vital Signs Last 24 Hrs  T(C): 36 (25 Nov 2024 11:16), Max: 36.8 (24 Nov 2024 15:36)  T(F): 96.8 (25 Nov 2024 11:16), Max: 98.2 (24 Nov 2024 15:36)  HR: 75 (25 Nov 2024 11:16) (67 - 108)  BP: 97/64 (25 Nov 2024 11:16) (93/51 - 99/59)  BP(mean): --  RR: 18 (25 Nov 2024 11:16) (18 - 18)  SpO2: 100% (25 Nov 2024 11:16) (90% - 100%)    Parameters below as of 25 Nov 2024 11:16  Patient On (Oxygen Delivery Method): nasal cannula  O2 Flow (L/min): 3    CONSTITUTIONAL: NAD, frail appearing   RESPIRATORY: Poor effort  CARDIOVASCULAR: Regular rate and rhythm, no LE edema  ABDOMEN: Nontender to palpation, normoactive bowel sounds  PSYCH: A+O x1    LABS:                        8.5    11.07 )-----------( 204      ( 24 Nov 2024 08:59 )             27.0     11-24    141  |  103  |  30.1[H]  ----------------------------<  168[H]  4.8   |  30.0[H]  |  0.83    Ca    11.9[H]      24 Nov 2024 08:59    TPro  6.0[L]  /  Alb  3.5  /  TBili  0.2[L]  /  DBili  x   /  AST  13  /  ALT  9   /  AlkPhos  132[H]  11-24          Urinalysis Basic - ( 24 Nov 2024 08:59 )    Color: x / Appearance: x / SG: x / pH: x  Gluc: 168 mg/dL / Ketone: x  / Bili: x / Urobili: x   Blood: x / Protein: x / Nitrite: x   Leuk Esterase: x / RBC: x / WBC x   Sq Epi: x / Non Sq Epi: x / Bacteria: x        CAPILLARY BLOOD GLUCOSE          RADIOLOGY & ADDITIONAL TESTS:  Results Reviewed:   Imaging Personally Reviewed:  Electrocardiogram Personally Reviewed:

## 2024-11-26 ENCOUNTER — RESULT REVIEW (OUTPATIENT)
Age: 79
End: 2024-11-26

## 2024-11-26 LAB
ANION GAP SERPL CALC-SCNC: 7 MMOL/L — SIGNIFICANT CHANGE UP (ref 5–17)
BUN SERPL-MCNC: 29 MG/DL — HIGH (ref 8–20)
CALCIUM SERPL-MCNC: 11.7 MG/DL — HIGH (ref 8.4–10.5)
CHLORIDE SERPL-SCNC: 105 MMOL/L — SIGNIFICANT CHANGE UP (ref 96–108)
CO2 SERPL-SCNC: 33 MMOL/L — HIGH (ref 22–29)
CREAT SERPL-MCNC: 0.85 MG/DL — SIGNIFICANT CHANGE UP (ref 0.5–1.3)
EGFR: 69 ML/MIN/1.73M2 — SIGNIFICANT CHANGE UP
GLUCOSE SERPL-MCNC: 72 MG/DL — SIGNIFICANT CHANGE UP (ref 70–99)
HCT VFR BLD CALC: 24.7 % — LOW (ref 34.5–45)
HCT VFR BLD CALC: 26.6 % — LOW (ref 34.5–45)
HGB BLD-MCNC: 7.6 G/DL — LOW (ref 11.5–15.5)
HGB BLD-MCNC: 8.4 G/DL — LOW (ref 11.5–15.5)
MCHC RBC-ENTMCNC: 29.3 PG — SIGNIFICANT CHANGE UP (ref 27–34)
MCHC RBC-ENTMCNC: 30 PG — SIGNIFICANT CHANGE UP (ref 27–34)
MCHC RBC-ENTMCNC: 30.8 G/DL — LOW (ref 32–36)
MCHC RBC-ENTMCNC: 31.6 G/DL — LOW (ref 32–36)
MCV RBC AUTO: 95 FL — SIGNIFICANT CHANGE UP (ref 80–100)
MCV RBC AUTO: 95.4 FL — SIGNIFICANT CHANGE UP (ref 80–100)
PLATELET # BLD AUTO: 242 K/UL — SIGNIFICANT CHANGE UP (ref 150–400)
PLATELET # BLD AUTO: 301 K/UL — SIGNIFICANT CHANGE UP (ref 150–400)
POTASSIUM SERPL-MCNC: 4.2 MMOL/L — SIGNIFICANT CHANGE UP (ref 3.5–5.3)
POTASSIUM SERPL-SCNC: 4.2 MMOL/L — SIGNIFICANT CHANGE UP (ref 3.5–5.3)
RBC # BLD: 2.59 M/UL — LOW (ref 3.8–5.2)
RBC # BLD: 2.8 M/UL — LOW (ref 3.8–5.2)
RBC # FLD: 13.3 % — SIGNIFICANT CHANGE UP (ref 10.3–14.5)
RBC # FLD: 13.3 % — SIGNIFICANT CHANGE UP (ref 10.3–14.5)
SODIUM SERPL-SCNC: 145 MMOL/L — SIGNIFICANT CHANGE UP (ref 135–145)
WBC # BLD: 10.82 K/UL — HIGH (ref 3.8–10.5)
WBC # BLD: 15.17 K/UL — HIGH (ref 3.8–10.5)
WBC # FLD AUTO: 10.82 K/UL — HIGH (ref 3.8–10.5)
WBC # FLD AUTO: 15.17 K/UL — HIGH (ref 3.8–10.5)

## 2024-11-26 PROCEDURE — 99232 SBSQ HOSP IP/OBS MODERATE 35: CPT

## 2024-11-26 PROCEDURE — 93306 TTE W/DOPPLER COMPLETE: CPT | Mod: 26

## 2024-11-26 RX ORDER — OLANZAPINE 20 MG/1
2.5 TABLET ORAL ONCE
Refills: 0 | Status: COMPLETED | OUTPATIENT
Start: 2024-11-26 | End: 2024-11-26

## 2024-11-26 RX ORDER — PIPERACILLIN SODIUM AND TAZOBACTAM SODIUM 4; .5 G/20ML; G/20ML
3.38 INJECTION, POWDER, LYOPHILIZED, FOR SOLUTION INTRAVENOUS EVERY 8 HOURS
Refills: 0 | Status: DISCONTINUED | OUTPATIENT
Start: 2024-11-26 | End: 2024-11-29

## 2024-11-26 RX ADMIN — IPRATROPIUM BROMIDE AND ALBUTEROL SULFATE 3 MILLILITER(S): 2.5; .5 SOLUTION RESPIRATORY (INHALATION) at 09:27

## 2024-11-26 RX ADMIN — LORAZEPAM 1 MILLIGRAM(S): 2 TABLET ORAL at 11:57

## 2024-11-26 RX ADMIN — Medication 75 MICROGRAM(S): at 07:21

## 2024-11-26 RX ADMIN — OLANZAPINE 2.5 MILLIGRAM(S): 20 TABLET ORAL at 16:18

## 2024-11-26 RX ADMIN — PIPERACILLIN SODIUM AND TAZOBACTAM SODIUM 25 GRAM(S): 4; .5 INJECTION, POWDER, LYOPHILIZED, FOR SOLUTION INTRAVENOUS at 23:48

## 2024-11-26 RX ADMIN — LORAZEPAM 1 MILLIGRAM(S): 2 TABLET ORAL at 22:07

## 2024-11-26 RX ADMIN — ENOXAPARIN SODIUM 30 MILLIGRAM(S): 30 INJECTION SUBCUTANEOUS at 18:25

## 2024-11-26 RX ADMIN — Medication 40 MILLIGRAM(S): at 07:21

## 2024-11-26 RX ADMIN — AZITHROMYCIN 255 MILLIGRAM(S): 250 TABLET, FILM COATED ORAL at 07:20

## 2024-11-26 RX ADMIN — IPRATROPIUM BROMIDE AND ALBUTEROL SULFATE 3 MILLILITER(S): 2.5; .5 SOLUTION RESPIRATORY (INHALATION) at 04:48

## 2024-11-26 RX ADMIN — Medication 81 MILLIGRAM(S): at 11:55

## 2024-11-26 RX ADMIN — Medication 10 MILLIGRAM(S): at 00:51

## 2024-11-26 RX ADMIN — IPRATROPIUM BROMIDE AND ALBUTEROL SULFATE 3 MILLILITER(S): 2.5; .5 SOLUTION RESPIRATORY (INHALATION) at 20:46

## 2024-11-26 RX ADMIN — Medication 10 MILLIGRAM(S): at 22:07

## 2024-11-26 RX ADMIN — IPRATROPIUM BROMIDE AND ALBUTEROL SULFATE 3 MILLILITER(S): 2.5; .5 SOLUTION RESPIRATORY (INHALATION) at 15:07

## 2024-11-26 RX ADMIN — PIPERACILLIN SODIUM AND TAZOBACTAM SODIUM 25 GRAM(S): 4; .5 INJECTION, POWDER, LYOPHILIZED, FOR SOLUTION INTRAVENOUS at 17:15

## 2024-11-26 RX ADMIN — TRAZODONE HYDROCHLORIDE 50 MILLIGRAM(S): 150 TABLET ORAL at 00:51

## 2024-11-26 RX ADMIN — TRAZODONE HYDROCHLORIDE 50 MILLIGRAM(S): 150 TABLET ORAL at 22:07

## 2024-11-26 NOTE — PROGRESS NOTE ADULT - SUBJECTIVE AND OBJECTIVE BOX
Eric Arango M.D.    Patient is a 79y old  Female who presents with a chief complaint of Pneumonia due to infectious organism     (26 Nov 2024 11:03)      SUBJECTIVE / OVERNIGHT EVENTS: no event overnight. Noted with episode of coughing when drinking thin liquids. ROS unable to be obtained.     MEDICATIONS  (STANDING):  albuterol/ipratropium for Nebulization 3 milliLiter(s) Nebulizer every 6 hours  aspirin enteric coated 81 milliGRAM(s) Oral daily  atorvastatin 10 milliGRAM(s) Oral at bedtime  cefTRIAXone Injectable. 1000 milliGRAM(s) IV Push every 24 hours  enoxaparin Injectable 30 milliGRAM(s) SubCutaneous every 24 hours  levothyroxine 75 MICROGram(s) Oral daily  traZODone 50 milliGRAM(s) Oral at bedtime    MEDICATIONS  (PRN):  LORazepam     Tablet 1 milliGRAM(s) Oral three times a day PRN for anxiety      I&O's Summary      PHYSICAL EXAM:  Vital Signs Last 24 Hrs  T(C): 36.7 (26 Nov 2024 08:00), Max: 36.8 (25 Nov 2024 19:54)  T(F): 98.1 (26 Nov 2024 08:00), Max: 98.3 (25 Nov 2024 19:54)  HR: 96 (26 Nov 2024 09:27) (66 - 96)  BP: 112/73 (26 Nov 2024 08:00) (112/73 - 129/78)  BP(mean): --  RR: 18 (26 Nov 2024 08:00) (18 - 19)  SpO2: 96% (26 Nov 2024 09:27) (96% - 98%)    Parameters below as of 26 Nov 2024 09:27  Patient On (Oxygen Delivery Method): nasal cannula, 2L    CONSTITUTIONAL: NAD, frail appearing cachetic   RESPIRATORY: Poor effort, tachypnea  CARDIOVASCULAR: Regular rate and rhythm, no LE edema  ABDOMEN: Nontender to palpation, normoactive bowel sounds  PSYCH: A+O x1  LABS:                        7.6    10.82 )-----------( 242      ( 26 Nov 2024 06:29 )             24.7     11-26    145  |  105  |  29.0[H]  ----------------------------<  72  4.2   |  33.0[H]  |  0.85    Ca    11.7[H]      26 Nov 2024 06:29            Urinalysis Basic - ( 26 Nov 2024 06:29 )    Color: x / Appearance: x / SG: x / pH: x  Gluc: 72 mg/dL / Ketone: x  / Bili: x / Urobili: x   Blood: x / Protein: x / Nitrite: x   Leuk Esterase: x / RBC: x / WBC x   Sq Epi: x / Non Sq Epi: x / Bacteria: x        CAPILLARY BLOOD GLUCOSE          RADIOLOGY & ADDITIONAL TESTS:  Results Reviewed:   Imaging Personally Reviewed:  Electrocardiogram Personally Reviewed:

## 2024-11-26 NOTE — DIETITIAN INITIAL EVALUATION ADULT - PERTINENT MEDS FT
MEDICATIONS  (STANDING):  albuterol/ipratropium for Nebulization 3 milliLiter(s) Nebulizer every 6 hours  aspirin enteric coated 81 milliGRAM(s) Oral daily  atorvastatin 10 milliGRAM(s) Oral at bedtime  cefTRIAXone Injectable. 1000 milliGRAM(s) IV Push every 24 hours  enoxaparin Injectable 30 milliGRAM(s) SubCutaneous every 24 hours  levothyroxine 75 MICROGram(s) Oral daily  traZODone 50 milliGRAM(s) Oral at bedtime    MEDICATIONS  (PRN):  LORazepam     Tablet 1 milliGRAM(s) Oral three times a day PRN for anxiety

## 2024-11-26 NOTE — DIETITIAN NUTRITION RISK NOTIFICATION - TREATMENT: THE FOLLOWING DIET HAS BEEN RECOMMENDED
Diet, Pureed:   Moderately Thick Liquids (MODTHICKLIQS)  Supplement Feeding Modality:  Oral  Ensure Enlive Cans or Servings Per Day:  2       Frequency:  Two Times a day (11-26-24 @ 11:26) [Active]

## 2024-11-26 NOTE — DIETITIAN INITIAL EVALUATION ADULT - OTHER INFO
Pt is a 79 year old  F PMHx dementia, COPD on home PRN O2, hypothyroidism, and HTN p/w SOB. Pt poor historian, Per Grandson, pt was found by aide with increased WOB at home earlier today, noted by EMS to be desatting to the 70s on RA and placed on O2 and came in the further eval. Per chart review, ROS negative for fever, chill, CP, nv/cd nor urinary concerns.   ED vitals notable with hypoxia, initially on NRB, now weaned to NC, labs with WBC 14, BNP 2379, Ca 12.5 s/p Solumedrol, CTX and Azithro, admitted for further care.   Pt admitted with Acute on chronic resp failure 2/2 combination of COPD exac, + super imposed PNA.

## 2024-11-26 NOTE — PROGRESS NOTE ADULT - NUTRITIONAL ASSESSMENT
This patient has been assessed with a concern for Malnutrition and has been determined to have a diagnosis/diagnoses of Moderate protein-calorie malnutrition and Underweight (BMI < 19).    This patient is being managed with:   Diet Pureed-  Moderately Thick Liquids (MODTHICKLIQS)  Supplement Feeding Modality:  Oral  Ensure Enlive Cans or Servings Per Day:  2       Frequency:  Two Times a day  Entered: Nov 26 2024 11:26AM

## 2024-11-26 NOTE — DIETITIAN INITIAL EVALUATION ADULT - ORAL INTAKE PTA/DIET HISTORY
Pt with history of dementia; unable to provide nutrition history. Pt had swallow evaluation; diet advanced to puree with mod thick liquids; reassessed this morning, for thin liquids. Pt with fair to poor PO intake noted at this time. NFPE conducted.

## 2024-11-26 NOTE — PROGRESS NOTE ADULT - ASSESSMENT
80yo F PMHx dementia, COPD on home PRN O2, hypothyroidism, and HTN p/w SOB, admitted for workup for acute on chronic resp failure with hypoxia, suspect 2/2 aspiration event.     #Acute on chronic resp failure 2/2 combination of COPD exac, + super imposed PNA 2/2 aspiration   initial hypoxia needed NRB, now improved on NC, continues to be tachypnea at times   BNP 2379  CXR neg, CT noted with bronchial thickening and LORI opacity  f/u repeat CT chest in 2 months to ensure resolution of LORI opacity  c/w CTX, plan for total 5 days  Diet advanced per swallow -- plan for pureed and mod thick liquid for now, as pt noted with coughing while drinking thin liquid 11/26  RVP panel neg  s/p Solumedrol, c/w Duoneb ATC  TTE pending     #Hypercalcemia  unclear etiology, also with elevated AP, r/o malignancy   PTH and vitamin D level normal  PTH related peptide pending     #Anemia  suspect partly dilutional, ?22 chronic illness  check urine panel and b12 and folate  trend CBC    #HTN  Hold home BP as BP soft, restart meds as needed    #Hypothyroidism  c/w home synthroid    #Dementia  c/w home PRN Ativan for agitation   Trazadone QHS    DVT ppx: Lovenox QD  Dispo: pending TTE, improvement in resp status and Hg level, dc 1-2 days     Code status: FULL   78yo F PMHx dementia, COPD on home PRN O2, hypothyroidism, and HTN p/w SOB, admitted for workup for acute on chronic resp failure with hypoxia, suspect 2/2 aspiration event.     #Acute on chronic resp failure 2/2 combination of COPD exac, + super imposed PNA 2/2 aspiration   initial hypoxia needed NRB, now improved on NC, continues to be tachypnea at times   BNP 2379  CXR neg, CT noted with bronchial thickening and LORI opacity  f/u repeat CT chest in 2 months to ensure resolution of LORI opacity  c/w CTX, plan for total 5 days  Diet advanced per swallow -- plan for pureed and mod thick liquid for now, as pt noted with coughing while drinking thin liquid 11/26  RVP panel neg  s/p Solumedrol, c/w Duoneb ATC  TTE pending     #Hypercalcemia  unclear etiology, also with elevated AP, r/o malignancy   PTH and vitamin D level normal  PTH related peptide pending     #Anemia  suspect partly dilutional, ?22 chronic illness  check urine panel and b12 and folate  trend CBC    #HTN  Hold home BP as BP soft, restart meds as needed    #Hypothyroidism  c/w home synthroid    #Dementia  c/w home PRN Ativan for agitation   Trazadone QHS    DVT ppx: Lovenox QD  Dispo: pending TTE, improvement in resp status and Hg level    Code status: FULL    PT eval pending    80yo F PMHx dementia, COPD on home PRN O2, hypothyroidism, and HTN p/w SOB, admitted for workup for acute on chronic resp failure with hypoxia, suspect 2/2 aspiration event.     #Acute on chronic resp failure 2/2 combination of COPD exac, + super imposed PNA 2/2 aspiration   initial hypoxia needed NRB, now improved on NC, continues to be tachypnea at times   BNP 2379  CXR neg, CT noted with bronchial thickening and LORI opacity  f/u repeat CT chest in 2 months to ensure resolution of LORI opacity  worsening WBC, will change abx to Zosyn   Diet advanced per swallow -- plan for pureed and mod thick liquid for now, as pt noted with coughing while drinking thin liquid 11/26  RVP panel neg  s/p Solumedrol, c/w Duoneb ATC  TTE pending     #Hypercalcemia  unclear etiology, also with elevated AP, r/o malignancy   PTH and vitamin D level normal  PTH related peptide pending     #Anemia  suspect partly dilutional, ?22 chronic illness  check urine panel and b12 and folate  trend CBC    #HTN  Hold home BP as BP soft, restart meds as needed    #Hypothyroidism  c/w home synthroid    #Dementia  c/w home PRN Ativan for agitation   Trazadone QHS    DVT ppx: Lovenox QD  Dispo: pending TTE, improvement in resp status and Hg level    Code status: FULL    PT eval pending

## 2024-11-26 NOTE — DIETITIAN INITIAL EVALUATION ADULT - PERTINENT LABORATORY DATA
11-26    145  |  105  |  29.0[H]  ----------------------------<  72  4.2   |  33.0[H]  |  0.85    Ca    11.7[H]      26 Nov 2024 06:29

## 2024-11-26 NOTE — DIETITIAN INITIAL EVALUATION ADULT - ETIOLOGY
Related to inability to meet sufficient protein energy needs in setting of advanced age, dementia now w/ PNA

## 2024-11-27 LAB
ANION GAP SERPL CALC-SCNC: 5 MMOL/L — SIGNIFICANT CHANGE UP (ref 5–17)
BASOPHILS # BLD AUTO: 0.02 K/UL — SIGNIFICANT CHANGE UP (ref 0–0.2)
BASOPHILS NFR BLD AUTO: 0.2 % — SIGNIFICANT CHANGE UP (ref 0–2)
BUN SERPL-MCNC: 24.9 MG/DL — HIGH (ref 8–20)
CALCIUM SERPL-MCNC: 10.8 MG/DL — HIGH (ref 8.4–10.5)
CHLORIDE SERPL-SCNC: 104 MMOL/L — SIGNIFICANT CHANGE UP (ref 96–108)
CO2 SERPL-SCNC: 34 MMOL/L — HIGH (ref 22–29)
CREAT SERPL-MCNC: 0.93 MG/DL — SIGNIFICANT CHANGE UP (ref 0.5–1.3)
EGFR: 62 ML/MIN/1.73M2 — SIGNIFICANT CHANGE UP
EOSINOPHIL # BLD AUTO: 0.09 K/UL — SIGNIFICANT CHANGE UP (ref 0–0.5)
EOSINOPHIL NFR BLD AUTO: 0.8 % — SIGNIFICANT CHANGE UP (ref 0–6)
FERRITIN SERPL-MCNC: 303 NG/ML — SIGNIFICANT CHANGE UP (ref 13–330)
FOLATE SERPL-MCNC: 6.2 NG/ML — SIGNIFICANT CHANGE UP
GLUCOSE SERPL-MCNC: 81 MG/DL — SIGNIFICANT CHANGE UP (ref 70–99)
HCT VFR BLD CALC: 26.2 % — LOW (ref 34.5–45)
HGB BLD-MCNC: 8.2 G/DL — LOW (ref 11.5–15.5)
IMM GRANULOCYTES NFR BLD AUTO: 1.8 % — HIGH (ref 0–0.9)
IRON SATN MFR SERPL: 16 % — SIGNIFICANT CHANGE UP (ref 14–50)
IRON SATN MFR SERPL: 35 UG/DL — LOW (ref 37–145)
LYMPHOCYTES # BLD AUTO: 0.87 K/UL — LOW (ref 1–3.3)
LYMPHOCYTES # BLD AUTO: 7.3 % — LOW (ref 13–44)
MCHC RBC-ENTMCNC: 29.5 PG — SIGNIFICANT CHANGE UP (ref 27–34)
MCHC RBC-ENTMCNC: 31.3 G/DL — LOW (ref 32–36)
MCV RBC AUTO: 94.2 FL — SIGNIFICANT CHANGE UP (ref 80–100)
MONOCYTES # BLD AUTO: 1.14 K/UL — HIGH (ref 0–0.9)
MONOCYTES NFR BLD AUTO: 9.6 % — SIGNIFICANT CHANGE UP (ref 2–14)
NEUTROPHILS # BLD AUTO: 9.57 K/UL — HIGH (ref 1.8–7.4)
NEUTROPHILS NFR BLD AUTO: 80.3 % — HIGH (ref 43–77)
PLATELET # BLD AUTO: 252 K/UL — SIGNIFICANT CHANGE UP (ref 150–400)
POTASSIUM SERPL-MCNC: 4.3 MMOL/L — SIGNIFICANT CHANGE UP (ref 3.5–5.3)
POTASSIUM SERPL-SCNC: 4.3 MMOL/L — SIGNIFICANT CHANGE UP (ref 3.5–5.3)
RBC # BLD: 2.78 M/UL — LOW (ref 3.8–5.2)
RBC # FLD: 13.3 % — SIGNIFICANT CHANGE UP (ref 10.3–14.5)
SODIUM SERPL-SCNC: 143 MMOL/L — SIGNIFICANT CHANGE UP (ref 135–145)
TIBC SERPL-MCNC: 225 UG/DL — SIGNIFICANT CHANGE UP (ref 220–430)
TRANSFERRIN SERPL-MCNC: 157 MG/DL — LOW (ref 192–382)
VIT B12 SERPL-MCNC: 267 PG/ML — SIGNIFICANT CHANGE UP (ref 232–1245)
WBC # BLD: 11.9 K/UL — HIGH (ref 3.8–10.5)
WBC # FLD AUTO: 11.9 K/UL — HIGH (ref 3.8–10.5)

## 2024-11-27 PROCEDURE — 99232 SBSQ HOSP IP/OBS MODERATE 35: CPT

## 2024-11-27 RX ADMIN — TRAZODONE HYDROCHLORIDE 50 MILLIGRAM(S): 150 TABLET ORAL at 21:57

## 2024-11-27 RX ADMIN — Medication 75 MICROGRAM(S): at 05:11

## 2024-11-27 RX ADMIN — ENOXAPARIN SODIUM 30 MILLIGRAM(S): 30 INJECTION SUBCUTANEOUS at 17:16

## 2024-11-27 RX ADMIN — IPRATROPIUM BROMIDE AND ALBUTEROL SULFATE 3 MILLILITER(S): 2.5; .5 SOLUTION RESPIRATORY (INHALATION) at 14:41

## 2024-11-27 RX ADMIN — IPRATROPIUM BROMIDE AND ALBUTEROL SULFATE 3 MILLILITER(S): 2.5; .5 SOLUTION RESPIRATORY (INHALATION) at 20:32

## 2024-11-27 RX ADMIN — PIPERACILLIN SODIUM AND TAZOBACTAM SODIUM 25 GRAM(S): 4; .5 INJECTION, POWDER, LYOPHILIZED, FOR SOLUTION INTRAVENOUS at 15:22

## 2024-11-27 RX ADMIN — PIPERACILLIN SODIUM AND TAZOBACTAM SODIUM 25 GRAM(S): 4; .5 INJECTION, POWDER, LYOPHILIZED, FOR SOLUTION INTRAVENOUS at 21:56

## 2024-11-27 RX ADMIN — LORAZEPAM 1 MILLIGRAM(S): 2 TABLET ORAL at 14:41

## 2024-11-27 RX ADMIN — PIPERACILLIN SODIUM AND TAZOBACTAM SODIUM 25 GRAM(S): 4; .5 INJECTION, POWDER, LYOPHILIZED, FOR SOLUTION INTRAVENOUS at 08:02

## 2024-11-27 RX ADMIN — Medication 10 MILLIGRAM(S): at 21:56

## 2024-11-27 NOTE — PROGRESS NOTE ADULT - ASSESSMENT
80yo F PMHx dementia, COPD on home PRN O2, hypothyroidism, and HTN p/w SOB, admitted for workup for acute on chronic resp failure with hypoxia, suspect 2/2 aspiration event.     #Acute on chronic resp failure 2/2 combination of COPD exac, + super imposed PNA 2/2 aspiration   initial hypoxia needed NRB, now improved on NC, continues to be tachypnea at times   BNP 2379  CXR neg, CT noted with bronchial thickening and LORI opacity  f/u repeat CT chest in 2 months to ensure resolution of LORI opacity  worsening WBC, will change abx to Zosyn, plan for total 5 days, change to Augmentin on dc  pureed with mod thick liquid  RVP panel neg  s/p Solumedrol, c/w Duoneb ATC  TTE with hyperdynamic EF  D/w daughter at bedside, now open to palliative and hospice conversation, consult placed, pending recs    #Hypercalcemia  unclear etiology, also with elevated AP, r/o malignancy   PTH and vitamin D level normal  PTH related peptide pending     #Anemia  suspect partly dilutional, ?22 chronic illness  Repeat Hg stable, trend for now    #HTN  Hold home BP as BP soft, restart meds as needed    #Hypothyroidism  c/w home synthroid    #Dementia  c/w home PRN Ativan for agitation   Trazadone QHS    DVT ppx: Lovenox QD  Dispo: pending improvement in mental status, palliative and hospice eval, likely 1-2 days     Code status: FULL    PT eval pending

## 2024-11-27 NOTE — PROGRESS NOTE ADULT - SUBJECTIVE AND OBJECTIVE BOX
Eric Arango M.D.    Patient is a 79y old  Female who presents with a chief complaint of Hypoxia (26 Nov 2024 11:44)      SUBJECTIVE / OVERNIGHT EVENTS: no event overnight. ROS unable to be obtained.     MEDICATIONS  (STANDING):  albuterol/ipratropium for Nebulization 3 milliLiter(s) Nebulizer every 6 hours  aspirin enteric coated 81 milliGRAM(s) Oral daily  atorvastatin 10 milliGRAM(s) Oral at bedtime  enoxaparin Injectable 30 milliGRAM(s) SubCutaneous every 24 hours  levothyroxine 75 MICROGram(s) Oral daily  piperacillin/tazobactam IVPB.. 3.375 Gram(s) IV Intermittent every 8 hours  traZODone 50 milliGRAM(s) Oral at bedtime    MEDICATIONS  (PRN):  LORazepam     Tablet 1 milliGRAM(s) Oral three times a day PRN for anxiety      I&O's Summary      PHYSICAL EXAM:  Vital Signs Last 24 Hrs  T(C): 36.4 (27 Nov 2024 04:38), Max: 36.8 (26 Nov 2024 19:49)  T(F): 97.6 (27 Nov 2024 04:38), Max: 98.3 (26 Nov 2024 19:49)  HR: 75 (27 Nov 2024 04:38) (75 - 116)  BP: 125/72 (27 Nov 2024 04:38) (105/55 - 150/84)  BP(mean): 89 (27 Nov 2024 04:38) (89 - 89)  RR: 20 (27 Nov 2024 04:38) (18 - 20)  SpO2: 93% (27 Nov 2024 04:38) (93% - 100%)    Parameters below as of 27 Nov 2024 04:38  Patient On (Oxygen Delivery Method): nasal cannula  O2 Flow (L/min): 2    CONSTITUTIONAL: NAD, cachectic, mildly lethargic   RESPIRATORY: Poor effort, tachypnea  CARDIOVASCULAR: Regular rate and rhythm, no LE edema  ABDOMEN: Nontender to palpation, normoactive bowel sounds  PSYCH: A+O x1    LABS:                        8.2    11.90 )-----------( 252      ( 27 Nov 2024 07:00 )             26.2     11-27    143  |  104  |  24.9[H]  ----------------------------<  81  4.3   |  34.0[H]  |  0.93    Ca    10.8[H]      27 Nov 2024 07:00            Urinalysis Basic - ( 27 Nov 2024 07:00 )    Color: x / Appearance: x / SG: x / pH: x  Gluc: 81 mg/dL / Ketone: x  / Bili: x / Urobili: x   Blood: x / Protein: x / Nitrite: x   Leuk Esterase: x / RBC: x / WBC x   Sq Epi: x / Non Sq Epi: x / Bacteria: x        CAPILLARY BLOOD GLUCOSE          RADIOLOGY & ADDITIONAL TESTS:  Results Reviewed:   Imaging Personally Reviewed:  Electrocardiogram Personally Reviewed:

## 2024-11-28 LAB
ANION GAP SERPL CALC-SCNC: 8 MMOL/L — SIGNIFICANT CHANGE UP (ref 5–17)
BUN SERPL-MCNC: 20 MG/DL — SIGNIFICANT CHANGE UP (ref 8–20)
CALCIUM SERPL-MCNC: 10.8 MG/DL — HIGH (ref 8.4–10.5)
CHLORIDE SERPL-SCNC: 103 MMOL/L — SIGNIFICANT CHANGE UP (ref 96–108)
CO2 SERPL-SCNC: 33 MMOL/L — HIGH (ref 22–29)
CREAT SERPL-MCNC: 0.91 MG/DL — SIGNIFICANT CHANGE UP (ref 0.5–1.3)
EGFR: 63 ML/MIN/1.73M2 — SIGNIFICANT CHANGE UP
GLUCOSE SERPL-MCNC: 73 MG/DL — SIGNIFICANT CHANGE UP (ref 70–99)
HCT VFR BLD CALC: 23.1 % — LOW (ref 34.5–45)
HGB BLD-MCNC: 7.4 G/DL — LOW (ref 11.5–15.5)
MCHC RBC-ENTMCNC: 30.2 PG — SIGNIFICANT CHANGE UP (ref 27–34)
MCHC RBC-ENTMCNC: 32 G/DL — SIGNIFICANT CHANGE UP (ref 32–36)
MCV RBC AUTO: 94.3 FL — SIGNIFICANT CHANGE UP (ref 80–100)
PLATELET # BLD AUTO: 218 K/UL — SIGNIFICANT CHANGE UP (ref 150–400)
POTASSIUM SERPL-MCNC: 4.2 MMOL/L — SIGNIFICANT CHANGE UP (ref 3.5–5.3)
POTASSIUM SERPL-SCNC: 4.2 MMOL/L — SIGNIFICANT CHANGE UP (ref 3.5–5.3)
RBC # BLD: 2.45 M/UL — LOW (ref 3.8–5.2)
RBC # FLD: 13.2 % — SIGNIFICANT CHANGE UP (ref 10.3–14.5)
SODIUM SERPL-SCNC: 144 MMOL/L — SIGNIFICANT CHANGE UP (ref 135–145)
WBC # BLD: 9.6 K/UL — SIGNIFICANT CHANGE UP (ref 3.8–10.5)
WBC # FLD AUTO: 9.6 K/UL — SIGNIFICANT CHANGE UP (ref 3.8–10.5)

## 2024-11-28 PROCEDURE — 99232 SBSQ HOSP IP/OBS MODERATE 35: CPT

## 2024-11-28 RX ADMIN — PIPERACILLIN SODIUM AND TAZOBACTAM SODIUM 25 GRAM(S): 4; .5 INJECTION, POWDER, LYOPHILIZED, FOR SOLUTION INTRAVENOUS at 22:39

## 2024-11-28 RX ADMIN — ENOXAPARIN SODIUM 30 MILLIGRAM(S): 30 INJECTION SUBCUTANEOUS at 18:50

## 2024-11-28 RX ADMIN — TRAZODONE HYDROCHLORIDE 50 MILLIGRAM(S): 150 TABLET ORAL at 22:40

## 2024-11-28 RX ADMIN — PIPERACILLIN SODIUM AND TAZOBACTAM SODIUM 25 GRAM(S): 4; .5 INJECTION, POWDER, LYOPHILIZED, FOR SOLUTION INTRAVENOUS at 13:09

## 2024-11-28 RX ADMIN — IPRATROPIUM BROMIDE AND ALBUTEROL SULFATE 3 MILLILITER(S): 2.5; .5 SOLUTION RESPIRATORY (INHALATION) at 10:14

## 2024-11-28 RX ADMIN — PIPERACILLIN SODIUM AND TAZOBACTAM SODIUM 25 GRAM(S): 4; .5 INJECTION, POWDER, LYOPHILIZED, FOR SOLUTION INTRAVENOUS at 06:05

## 2024-11-28 RX ADMIN — IPRATROPIUM BROMIDE AND ALBUTEROL SULFATE 3 MILLILITER(S): 2.5; .5 SOLUTION RESPIRATORY (INHALATION) at 15:24

## 2024-11-28 RX ADMIN — Medication 81 MILLIGRAM(S): at 13:10

## 2024-11-28 RX ADMIN — IPRATROPIUM BROMIDE AND ALBUTEROL SULFATE 3 MILLILITER(S): 2.5; .5 SOLUTION RESPIRATORY (INHALATION) at 20:52

## 2024-11-28 RX ADMIN — Medication 10 MILLIGRAM(S): at 22:40

## 2024-11-28 RX ADMIN — Medication 75 MICROGRAM(S): at 06:05

## 2024-11-28 NOTE — PROGRESS NOTE ADULT - ASSESSMENT
78yo F PMHx dementia, COPD on home PRN O2, hypothyroidism, and HTN p/w SOB, admitted for workup for acute on chronic resp failure with hypoxia, suspect 2/2 aspiration event.     #Acute on chronic resp failure 2/2 combination of COPD exac, + super imposed PNA 2/2 aspiration   initial hypoxia needed NRB, now improved on NC, continues to be tachypnea at times   BNP 2379  CXR neg, CT noted with bronchial thickening and LORI opacity  f/u repeat CT chest in 2 months to ensure resolution of LORI opacity  worsening WBC, will change abx to Zosyn, plan for total 7 days, change to Augmentin on dc  pureed with mod thick liquid  RVP panel neg  s/p Solumedrol, c/w Duoneb ATC  TTE with hyperdynamic EF  D/w daughter at bedside, wants to speak with hospice, pending recs    #Hypercalcemia  unclear etiology, also with elevated AP, r/o malignancy   PTH and vitamin D level normal  PTH related peptide pending     #Anemia  suspect partly dilutional, ?22 chronic illness  Repeat Hg stable, trend for now    #HTN  Hold home BP as BP soft, restart meds as needed    #Hypothyroidism  c/w home synthroid    #Dementia  c/w home PRN Ativan for agitation   Trazadone QHS    DVT ppx: Lovenox QD  Dispo: pending hospice eval and PT eval     Code status: FULL    PT eval pending

## 2024-11-28 NOTE — PROGRESS NOTE ADULT - SUBJECTIVE AND OBJECTIVE BOX
Eric Arango M.D.    Patient is a 79y old  Female who presents with a chief complaint of Hypoxia (27 Nov 2024 11:12)      SUBJECTIVE / OVERNIGHT EVENTS: no event overnight. ROS unable to be obtained.     MEDICATIONS  (STANDING):  albuterol/ipratropium for Nebulization 3 milliLiter(s) Nebulizer every 6 hours  aspirin enteric coated 81 milliGRAM(s) Oral daily  atorvastatin 10 milliGRAM(s) Oral at bedtime  enoxaparin Injectable 30 milliGRAM(s) SubCutaneous every 24 hours  levothyroxine 75 MICROGram(s) Oral daily  piperacillin/tazobactam IVPB.. 3.375 Gram(s) IV Intermittent every 8 hours  traZODone 50 milliGRAM(s) Oral at bedtime    MEDICATIONS  (PRN):  LORazepam     Tablet 1 milliGRAM(s) Oral three times a day PRN for anxiety      I&O's Summary      PHYSICAL EXAM:  Vital Signs Last 24 Hrs  T(C): 36.8 (28 Nov 2024 07:49), Max: 36.8 (27 Nov 2024 15:58)  T(F): 98.3 (28 Nov 2024 07:49), Max: 98.3 (28 Nov 2024 07:49)  HR: 75 (28 Nov 2024 07:49) (74 - 97)  BP: 135/74 (28 Nov 2024 07:49) (109/66 - 145/77)  BP(mean): --  RR: 18 (28 Nov 2024 07:49) (18 - 20)  SpO2: 98% (28 Nov 2024 10:14) (93% - 99%)    Parameters below as of 28 Nov 2024 10:14  Patient On (Oxygen Delivery Method): nasal cannula    CONSTITUTIONAL: NAD, cachetic appearing   RESPIRATORY: Normal respiratory effort; mild rales bl bs  CARDIOVASCULAR: Regular rate and rhythm, no LE edema  ABDOMEN: Nontender to palpation, normoactive bowel sounds    LABS:                        7.4    9.60  )-----------( 218      ( 28 Nov 2024 05:25 )             23.1     11-28    144  |  103  |  20.0  ----------------------------<  73  4.2   |  33.0[H]  |  0.91    Ca    10.8[H]      28 Nov 2024 05:25            Urinalysis Basic - ( 28 Nov 2024 05:25 )    Color: x / Appearance: x / SG: x / pH: x  Gluc: 73 mg/dL / Ketone: x  / Bili: x / Urobili: x   Blood: x / Protein: x / Nitrite: x   Leuk Esterase: x / RBC: x / WBC x   Sq Epi: x / Non Sq Epi: x / Bacteria: x        CAPILLARY BLOOD GLUCOSE          RADIOLOGY & ADDITIONAL TESTS:  Results Reviewed:   Imaging Personally Reviewed:  Electrocardiogram Personally Reviewed:

## 2024-11-28 NOTE — PHYSICAL THERAPY INITIAL EVALUATION ADULT - LEVEL OF INDEPENDENCE: GAIT, REHAB EVAL
due to pt with reports of "I can't breathe" in standing, and pt with SpO2 desaturating to 90% while on 4LPM O2  in standing. Unsafe to attempt ambulation at this time./unable to perform

## 2024-11-28 NOTE — PHYSICAL THERAPY INITIAL EVALUATION ADULT - DIAGNOSIS, PT EVAL
Pt demonstrates functional limitations in bed mobility, transfers, and is unable to ambulate due to decreased strength, balance, endurance, and symptoms of ROBLEDO and SOB during functional mobility.

## 2024-11-28 NOTE — PHYSICAL THERAPY INITIAL EVALUATION ADULT - CRITERIA FOR SKILLED THERAPEUTIC INTERVENTIONS
Pt is not ready to DC to home at this moment due to SpO2 desaturation during functional mobility and reports of respiratory distress.  Ultimate DC plan, pt could DC to home with 24/7 supervision and hands on assistance for all mobility needs.  If pt does not have access to this level of care, pt will benefit from [facility]./impairments found/functional limitations in following categories/anticipated discharge recommendation

## 2024-11-28 NOTE — PHYSICAL THERAPY INITIAL EVALUATION ADULT - RANGE OF MOTION EXAMINATION, REHAB EVAL
BUEs WFL for use of RW in standing, BLEs WFL for supine to sit and sit to/from stand./bilateral upper extremity ROM was WFL (within functional limits)/bilateral lower extremity ROM was WFL (within functional limits)

## 2024-11-28 NOTE — PHYSICAL THERAPY INITIAL EVALUATION ADULT - ADDITIONAL COMMENTS
Pt is Ox 1 to name only, and is a poor historian.  According to RN, the pt lives with her daughters.    Pts prior level of function and full social history is unknown to this writer.

## 2024-11-28 NOTE — PHYSICAL THERAPY INITIAL EVALUATION ADULT - GENERAL OBSERVATIONS, REHAB EVAL
Pt received reclining in bed, bedrails x 4, bed alarm on, CBWR, tele,  and 2 LPM O2 via NC intact. Pt is confused however agreeable to PT evaluation.

## 2024-11-28 NOTE — PHYSICAL THERAPY INITIAL EVALUATION ADULT - NSPTDISCHREC_GEN_A_CORE
Pt is not ready to DC to home at this moment due to SpO2 desaturation during functional mobility and reports of respiratory distress.  Ultimate DC plan, pt could DC to home with 24/7 supervision and hands on assistance for all mobility needs.  If pt does not have access to this level of care, pt will benefit from [facility]./Sub-acute Rehab

## 2024-11-29 ENCOUNTER — TRANSCRIPTION ENCOUNTER (OUTPATIENT)
Age: 79
End: 2024-11-29

## 2024-11-29 LAB
ANION GAP SERPL CALC-SCNC: 9 MMOL/L — SIGNIFICANT CHANGE UP (ref 5–17)
BLD GP AB SCN SERPL QL: SIGNIFICANT CHANGE UP
BUN SERPL-MCNC: 21.5 MG/DL — HIGH (ref 8–20)
CALCIUM SERPL-MCNC: 9.8 MG/DL — SIGNIFICANT CHANGE UP (ref 8.4–10.5)
CHLORIDE SERPL-SCNC: 105 MMOL/L — SIGNIFICANT CHANGE UP (ref 96–108)
CO2 SERPL-SCNC: 32 MMOL/L — HIGH (ref 22–29)
CREAT SERPL-MCNC: 0.88 MG/DL — SIGNIFICANT CHANGE UP (ref 0.5–1.3)
EGFR: 66 ML/MIN/1.73M2 — SIGNIFICANT CHANGE UP
GLUCOSE SERPL-MCNC: 68 MG/DL — LOW (ref 70–99)
HCT VFR BLD CALC: 21.7 % — LOW (ref 34.5–45)
HCT VFR BLD CALC: 22.8 % — LOW (ref 34.5–45)
HGB BLD-MCNC: 6.8 G/DL — CRITICAL LOW (ref 11.5–15.5)
HGB BLD-MCNC: 7 G/DL — CRITICAL LOW (ref 11.5–15.5)
MCHC RBC-ENTMCNC: 29.5 PG — SIGNIFICANT CHANGE UP (ref 27–34)
MCHC RBC-ENTMCNC: 29.6 PG — SIGNIFICANT CHANGE UP (ref 27–34)
MCHC RBC-ENTMCNC: 30.7 G/DL — LOW (ref 32–36)
MCHC RBC-ENTMCNC: 31.3 G/DL — LOW (ref 32–36)
MCV RBC AUTO: 94.3 FL — SIGNIFICANT CHANGE UP (ref 80–100)
MCV RBC AUTO: 96.2 FL — SIGNIFICANT CHANGE UP (ref 80–100)
PLATELET # BLD AUTO: 197 K/UL — SIGNIFICANT CHANGE UP (ref 150–400)
PLATELET # BLD AUTO: 228 K/UL — SIGNIFICANT CHANGE UP (ref 150–400)
POTASSIUM SERPL-MCNC: 4 MMOL/L — SIGNIFICANT CHANGE UP (ref 3.5–5.3)
POTASSIUM SERPL-SCNC: 4 MMOL/L — SIGNIFICANT CHANGE UP (ref 3.5–5.3)
RBC # BLD: 2.3 M/UL — LOW (ref 3.8–5.2)
RBC # BLD: 2.37 M/UL — LOW (ref 3.8–5.2)
RBC # FLD: 13.2 % — SIGNIFICANT CHANGE UP (ref 10.3–14.5)
RBC # FLD: 13.4 % — SIGNIFICANT CHANGE UP (ref 10.3–14.5)
SODIUM SERPL-SCNC: 146 MMOL/L — HIGH (ref 135–145)
WBC # BLD: 10.9 K/UL — HIGH (ref 3.8–10.5)
WBC # BLD: 14.03 K/UL — HIGH (ref 3.8–10.5)
WBC # FLD AUTO: 10.9 K/UL — HIGH (ref 3.8–10.5)
WBC # FLD AUTO: 14.03 K/UL — HIGH (ref 3.8–10.5)

## 2024-11-29 PROCEDURE — 99232 SBSQ HOSP IP/OBS MODERATE 35: CPT

## 2024-11-29 PROCEDURE — 76942 ECHO GUIDE FOR BIOPSY: CPT | Mod: 26

## 2024-11-29 PROCEDURE — 99222 1ST HOSP IP/OBS MODERATE 55: CPT

## 2024-11-29 PROCEDURE — 99497 ADVNCD CARE PLAN 30 MIN: CPT | Mod: 25

## 2024-11-29 RX ORDER — ACETAMINOPHEN 500MG 500 MG/1
650 TABLET, COATED ORAL EVERY 6 HOURS
Refills: 0 | Status: DISCONTINUED | OUTPATIENT
Start: 2024-11-29 | End: 2024-12-01

## 2024-11-29 RX ORDER — PANTOPRAZOLE SODIUM 40 MG/1
40 TABLET, DELAYED RELEASE ORAL
Refills: 0 | Status: DISCONTINUED | OUTPATIENT
Start: 2024-11-29 | End: 2024-11-29

## 2024-11-29 RX ORDER — AMOXICILLIN/POTASSIUM CLAV 250-125 MG
1 TABLET ORAL EVERY 8 HOURS
Refills: 0 | Status: DISCONTINUED | OUTPATIENT
Start: 2024-11-29 | End: 2024-12-01

## 2024-11-29 RX ORDER — PANTOPRAZOLE SODIUM 40 MG/1
40 TABLET, DELAYED RELEASE ORAL
Refills: 0 | Status: DISCONTINUED | OUTPATIENT
Start: 2024-11-29 | End: 2024-12-01

## 2024-11-29 RX ADMIN — PIPERACILLIN SODIUM AND TAZOBACTAM SODIUM 25 GRAM(S): 4; .5 INJECTION, POWDER, LYOPHILIZED, FOR SOLUTION INTRAVENOUS at 06:10

## 2024-11-29 RX ADMIN — Medication 1 TABLET(S): at 22:22

## 2024-11-29 RX ADMIN — IPRATROPIUM BROMIDE AND ALBUTEROL SULFATE 3 MILLILITER(S): 2.5; .5 SOLUTION RESPIRATORY (INHALATION) at 09:54

## 2024-11-29 RX ADMIN — TRAZODONE HYDROCHLORIDE 50 MILLIGRAM(S): 150 TABLET ORAL at 22:22

## 2024-11-29 RX ADMIN — Medication 81 MILLIGRAM(S): at 12:58

## 2024-11-29 RX ADMIN — Medication 75 MICROGRAM(S): at 06:09

## 2024-11-29 RX ADMIN — PANTOPRAZOLE SODIUM 40 MILLIGRAM(S): 40 TABLET, DELAYED RELEASE ORAL at 17:49

## 2024-11-29 RX ADMIN — PIPERACILLIN SODIUM AND TAZOBACTAM SODIUM 25 GRAM(S): 4; .5 INJECTION, POWDER, LYOPHILIZED, FOR SOLUTION INTRAVENOUS at 14:29

## 2024-11-29 RX ADMIN — Medication 10 MILLIGRAM(S): at 22:22

## 2024-11-29 NOTE — CONSULT NOTE ADULT - SUBJECTIVE AND OBJECTIVE BOX
Palliative Care Consult  79 year old female with dementia, COPD, admitted for acute on chronic respiratory failure with hypoxia      HPI:  78yo F PMHx dementia, COPD on home PRN O2, hypothyroidism, and HTN p/w SOB. Pt poor historian, history partly obtained from grandson on the phone. Unable to reach patient's son Quentin ([phone]). Per Grandson, pt was found by adie with increased WOB at home earlier today, noted by EMS to be desatting to the 70s on RA and placed on O2 and came in the further eval. Per chart review, ROS negative for fever, chill, CP, nv/cd nor urinary concerns.   ED vitals notable with hypoxia, initially on NRB, now weaned to NC, labs with WBC 14, BNP 2379, Ca 12.5 s/p Solumedrol, CTX and Azithro, admitted for further care.    (23 Nov 2024 16:45)      PERTINENT PMH REVIEWED: Yes     PAST MEDICAL & SURGICAL HISTORY:  HTN (hypertension)      Hypothyroidism      COPD with hypoxia          SOCIAL HISTORY:                      Substance history: none                    Admitted from:  home                      Faith/spirituality: unknown                    Cultural concerns: none                      Surrogate/HCP/Guardian: Phone#: Quentin Mehta son [phone] HCP     FAMILY HISTORY:  No family history related to admission diagonsis    Allergies  No Known Allergies      ADVANCE DIRECTIVES/TREATMENT PREFERENCES:  Full Code    Baseline ADLs (prior to admission):  Dependent      Karnofsky/Palliative Performance Status Version 2:  %30  http://npcrc.org/files/news/palliative_performance_scale_[email or web address]    Present Symptoms:   Dyspnea: no  Nausea/Vomiting: No  Anxiety:  No  Depression: No  Fatigue: No  Loss of appetite: No  Constipation: no    Pain: yes            Character-ache            Duration-intermittent            Effect-            Factors-movement            Frequency-intermittent            Location-back pain            Severity-moderate    Pain AD Score:2  http://geriatrictoolkit.[employer].edu/cog/painad.pdf (press ctrl + left click to view)    Review of Systems: Reviewed                     Positive: Pain on movement  All others negative    MEDICATIONS  (STANDING):  albuterol/ipratropium for Nebulization 3 milliLiter(s) Nebulizer every 6 hours  aspirin enteric coated 81 milliGRAM(s) Oral daily  atorvastatin 10 milliGRAM(s) Oral at bedtime  levothyroxine 75 MICROGram(s) Oral daily  pantoprazole  Injectable 40 milliGRAM(s) IV Push two times a day  piperacillin/tazobactam IVPB.. 3.375 Gram(s) IV Intermittent every 8 hours  traZODone 50 milliGRAM(s) Oral at bedtime    MEDICATIONS  (PRN):  LORazepam     Tablet 1 milliGRAM(s) Oral three times a day PRN for anxiety      PHYSICAL EXAM:  Vital Signs Last 24 Hrs  T(C): 36.9 (29 Nov 2024 07:27), Max: 37.3 (28 Nov 2024 15:50)  T(F): 98.4 (29 Nov 2024 07:27), Max: 99.1 (28 Nov 2024 15:50)  HR: 69 (29 Nov 2024 07:27) (69 - 109)  BP: 116/67 (29 Nov 2024 07:27) (105/66 - 134/78)  BP(mean): 92 (28 Nov 2024 19:45) (92 - 92)  RR: 18 (29 Nov 2024 07:27) (17 - 18)  SpO2: 98% (29 Nov 2024 07:27) (92% - 100%)    Parameters below as of 29 Nov 2024 07:27  Patient On (Oxygen Delivery Method): nasal cannula  O2 Flow (L/min): 4      General: alert , confused    HEENT: normal    Lungs: comfortable     CV: normal      GI: incontinent    : incontinent    MSK: weakness     Neuro:confused    Skin: thin frail dry skin    LABS:                        6.8    10.90 )-----------( 197      ( 29 Nov 2024 04:41 )             21.7     11-29    146[H]  |  105  |  21.5[H]  ----------------------------<  68[L]  4.0   |  32.0[H]  |  0.88    Ca    9.8      29 Nov 2024 04:41      Urinalysis Basic - ( 29 Nov 2024 04:41 )    Color: x / Appearance: x / SG: x / pH: x  Gluc: 68 mg/dL / Ketone: x  / Bili: x / Urobili: x   Blood: x / Protein: x / Nitrite: x   Leuk Esterase: x / RBC: x / WBC x   Sq Epi: x / Non Sq Epi: x / Bacteria: x      I&O's Summary      RADIOLOGY & ADDITIONAL STUDIES:    CT chest 11.24.24    IMPRESSION:  No evidence of pulmonary embolism.    Small bibasilar subsegmental consolidations with interlobular septal  fluid/thickening and distal bronchial wall thickening, right greater than   left, concerning for early infectious/inflammatory process. Associated   small left pleural effusion present.    Additional left upper lobe opacity of indeterminate etiology. Neoplasm   not entirely excluded, and follow-up to resolution recommended.

## 2024-11-29 NOTE — DISCHARGE NOTE PROVIDER - NSDCMRMEDTOKEN_GEN_ALL_CORE_FT
Aspir 81 oral delayed release tablet: 1 tab(s) orally once a day  Ativan 1 mg oral tablet: 1 tab(s) orally 3 times a day as needed for  anxiety  benazepril 10 mg oral tablet: 1 tab(s) orally once a day  levothyroxine 75 mcg (0.075 mg) oral capsule: 1 cap(s) orally once a day  Lipitor 10 mg oral tablet: 1 tab(s) orally once a day (at bedtime)   amoxicillin-clavulanate 875 mg-125 mg oral tablet: 875 milligram(s) orally 2 times a day  Aspir 81 oral delayed release tablet: 1 tab(s) orally once a day  levothyroxine 75 mcg (0.075 mg) oral capsule: 1 cap(s) orally once a day  Lipitor 10 mg oral tablet: 1 tab(s) orally once a day (at bedtime)   amoxicillin-clavulanate 875 mg-125 mg oral tablet: 875 milligram(s) orally 2 times a day Start on 12/1/24  Aspir 81 oral delayed release tablet: 1 tab(s) orally once a day  levothyroxine 75 mcg (0.075 mg) oral capsule: 1 cap(s) orally once a day  Lipitor 10 mg oral tablet: 1 tab(s) orally once a day (at bedtime)  LORazepam 0.5 mg oral tablet: 1 tab(s) orally every 6 hours as needed for -for anxiety MDD: 4 tablets  morphine 20 mg/mL oral concentrate: 0.25 milliliter(s) sublingual every 6 hours as needed for pain or shortness of breath MDD: 1 mL  prochlorperazine 10 mg oral tablet: 1 tab(s) orally 2 times a day as needed for  nausea

## 2024-11-29 NOTE — CONSULT NOTE ADULT - CONVERSATION DETAILS
Patient with mod-severe dementia - Functional Assessment Staging Scale (FAST) 6e -Unable to independently cook/feed self, dress, bathe, go to bathroom without assistance  Spoke to HCP Goznalez (son) and patient's daughter. Family inquiring about hospice support at home in addition to current CDPAP aides in place.   Reached out to bryant mitchell from [employer] to coordinate referral for home hospice  Discussed advance directives - Gonzalez is HCP, Gonzalez wishes to complete DNR DNI when he comes to visit tomorrow - wants to hold off on putting it in place now - discussed with him that if she was to sustain cardiopulmonary arrest without it in place the staff may initiate CPR and intubation (including vent support) Gonzalez expressed understanding. At this time remains full code.

## 2024-11-29 NOTE — DISCHARGE NOTE PROVIDER - DETAILS OF MALNUTRITION DIAGNOSIS/DIAGNOSES
This patient has been assessed with a concern for Malnutrition and was treated during this hospitalization for the following Nutrition diagnosis/diagnoses:     -  11/26/2024: Moderate protein-calorie malnutrition   -  11/26/2024: Underweight (BMI < 19)

## 2024-11-29 NOTE — PROGRESS NOTE ADULT - ASSESSMENT
78yo F PMHx dementia, COPD on home PRN O2, hypothyroidism, and HTN p/w SOB, admitted for workup for acute on chronic resp failure with hypoxia, suspect 2/2 aspiration event.     #Acute on chronic resp failure 2/2 combination of COPD exac, + super imposed PNA 2/2 aspiration   initial hypoxia needed NRB, now improved on NC, continues to be tachypnea at times   BNP 2379  CXR neg, CT noted with bronchial thickening and LORI opacity  f/u repeat CT chest in 2 months to ensure resolution of LORI opacity  worsening WBC, will change abx to Zosyn, plan for total 7 days, change to Augmentin on dc  pureed with mod thick liquid  RVP panel neg  s/p Solumedrol, c/w Duoneb ATC  TTE with hyperdynamic EF  Hospice referral made    #Hypercalcemia  unclear etiology, also with elevated AP, r/o malignancy   PTH and vitamin D level normal  PTH related peptide pending     #Anemia  suspect partly dilutional, ?22 chronic illness  Hg 6.8 this AM, attempted to give blood, but pt IV infiltrated -- PA d/w daughter, defer further IV placement and no more blood    #HTN  Hold home BP as BP soft, restart meds as needed    #Hypothyroidism  c/w home synthroid    #Dementia  c/w home PRN Ativan for agitation   Trazadone QHS    DVT ppx: Lovenox QD  Dispo: d/w hospice, son able to take patient home tomorrow to complete treatment with PO abx    Code status: FULL   78yo F PMHx dementia, COPD on home PRN O2, hypothyroidism, and HTN p/w SOB, admitted for workup for acute on chronic resp failure with hypoxia, suspect 2/2 aspiration event.     #Acute on chronic resp failure 2/2 combination of COPD exac, + super imposed PNA 2/2 aspiration   initial hypoxia needed NRB, now improved on NC, continues to be tachypnea at times   BNP 2379  CXR neg, CT noted with bronchial thickening and LORI opacity  f/u repeat CT chest in 2 months to ensure resolution of LORI opacity  worsening WBC, will change abx to Zosyn, plan for total 7 days, change to Augmentin on dc  pureed with mod thick liquid  RVP panel neg  s/p Solumedrol, c/w Duoneb ATC  TTE with hyperdynamic EF  Hospice referral made    #Hypercalcemia  unclear etiology, also with elevated AP, r/o malignancy   PTH and vitamin D level normal  PTH related peptide pending     #Anemia  suspect partly dilutional, ?22 chronic illness  Hg 6.8 this AM, attempted to give blood, but pt IV infiltrated -- PA d/w daughter, defer further IV placement and no more blood    #HTN  Hold home BP as BP soft, restart meds as needed    #Hypothyroidism  c/w home synthroid    #Dementia  c/w home PRN Ativan for agitation   Trazadone QHS    DVT ppx: Lovenox QD  Dispo: d/w hospice, son able to take patient home tomorrow to home hospice, will need to complete treatment with PO abx    Code status: FULL   78yo F PMHx dementia, COPD on home PRN O2, hypothyroidism, and HTN p/w SOB, admitted for workup for acute on chronic resp failure with hypoxia, suspect 2/2 aspiration event.     #Acute on chronic resp failure 2/2 combination of COPD exac, + super imposed PNA 2/2 aspiration   initial hypoxia needed NRB, now improved on NC, continues to be tachypnea at times   BNP 2379  CXR neg, CT noted with bronchial thickening and LORI opacity  f/u repeat CT chest in 2 months to ensure resolution of LORI opacity  worsening WBC, will change abx to Zosyn, plan for total 7 days, change to Augmentin on dc  pureed with mod thick liquid  RVP panel neg  s/p Solumedrol, c/w Duoneb ATC  TTE with hyperdynamic EF  Hospice referral made    #Hypercalcemia  unclear etiology, also with elevated AP, r/o malignancy   PTH and vitamin D level normal  PTH related peptide pending     #Anemia  suspect partly dilutional, ?22 chronic illness  Hg 6.8 this AM, attempted to give blood, but pt IV infiltrated -- PA d/w daughter, defer further IV placement and no more blood    #HTN  Hold home BP as BP soft, restart meds as needed    #Hypothyroidism  c/w home synthroid    #Dementia  c/w home PRN Ativan for agitation   Trazadone QHS    DVT ppx: Lovenox QD  Dispo: d/w hospice, son able to take patient home tomorrow to home hospice, will need to complete treatment with PO abx    Code status: FULL -- HCP Gonzalez will be able to visit pt 11/30, wishes to complete the MOLST form after he has seen her   78yo F PMHx dementia, COPD on home PRN O2, hypothyroidism, and HTN p/w SOB, admitted for workup for acute on chronic resp failure with hypoxia, suspect 2/2 aspiration event.     #Acute on chronic resp failure 2/2 combination of COPD exac, + super imposed PNA 2/2 aspiration   initial hypoxia needed NRB, now improved on NC, continues to be tachypnea at times   BNP 2379  CXR neg, CT noted with bronchial thickening and LORI opacity  f/u repeat CT chest in 2 months to ensure resolution of LORI opacity  worsening WBC, will change abx to Zosyn, plan for total 7 days, change to Augmentin on dc  pureed with mod thick liquid  RVP panel neg  s/p Solumedrol, c/w Duoneb ATC  TTE with hyperdynamic EF  Hospice referral made    #Hypercalcemia  unclear etiology, also with elevated AP, r/o malignancy   PTH and vitamin D level normal  PTH related peptide pending     #Anemia  suspect partly dilutional, ?22 chronic illness  Hg 6.8 this AM, attempted to give blood, but pt IV infiltrated -- PA d/w daughter, defer further IV placement and no more blood  defer blood draws for AM as pt pending home hospice    #HTN  Hold home BP as BP soft, restart meds as needed    #Hypothyroidism  c/w home synthroid    #Dementia  c/w home PRN Ativan for agitation   Trazadone QHS    DVT ppx: Lovenox QD  Dispo: d/w hospice, son able to take patient home tomorrow to home hospice, will need to complete treatment with PO abx    Code status: FULL -- HCP Gonzalez will be able to visit pt 11/30, wishes to complete the MOLST form after he has seen her

## 2024-11-29 NOTE — PROGRESS NOTE ADULT - SUBJECTIVE AND OBJECTIVE BOX
Eric Arango M.D.    Patient is a 79y old  Female who presents with a chief complaint of Hypoxia (29 Nov 2024 12:24)      SUBJECTIVE / OVERNIGHT EVENTS: no event overnight. Hg noted to be low this AM.     Patient denies chest pain, SOB, abd pain, N/V, fever, chills, dysuria or any other complaints. All remainder ROS negative.     MEDICATIONS  (STANDING):  albuterol/ipratropium for Nebulization 3 milliLiter(s) Nebulizer every 6 hours  aspirin enteric coated 81 milliGRAM(s) Oral daily  atorvastatin 10 milliGRAM(s) Oral at bedtime  levothyroxine 75 MICROGram(s) Oral daily  pantoprazole  Injectable 40 milliGRAM(s) IV Push two times a day  piperacillin/tazobactam IVPB.. 3.375 Gram(s) IV Intermittent every 8 hours  traZODone 50 milliGRAM(s) Oral at bedtime    MEDICATIONS  (PRN):  acetaminophen     Tablet .. 650 milliGRAM(s) Oral every 6 hours PRN Mild Pain (1 - 3)  LORazepam     Tablet 1 milliGRAM(s) Oral three times a day PRN for anxiety      I&O's Summary      PHYSICAL EXAM:  Vital Signs Last 24 Hrs  T(C): 36.9 (29 Nov 2024 12:45), Max: 37.3 (28 Nov 2024 15:50)  T(F): 98.4 (29 Nov 2024 12:45), Max: 99.1 (28 Nov 2024 15:50)  HR: 93 (29 Nov 2024 12:45) (69 - 105)  BP: 125/74 (29 Nov 2024 12:45) (102/65 - 134/78)  BP(mean): 92 (28 Nov 2024 19:45) (92 - 92)  RR: 18 (29 Nov 2024 12:45) (17 - 18)  SpO2: 93% (29 Nov 2024 12:45) (92% - 100%)    Parameters below as of 29 Nov 2024 12:45  Patient On (Oxygen Delivery Method): nasal cannula  O2 Flow (L/min): 4    CONSTITUTIONAL: NAD, cachetic appearing   RESPIRATORY: Normal respiratory effort; mild rales bl bs  CARDIOVASCULAR: Regular rate and rhythm, no LE edema  ABDOMEN: Nontender to palpation, normoactive bowel sounds    LABS:                        6.8    10.90 )-----------( 197      ( 29 Nov 2024 04:41 )             21.7     11-29    146[H]  |  105  |  21.5[H]  ----------------------------<  68[L]  4.0   |  32.0[H]  |  0.88    Ca    9.8      29 Nov 2024 04:41        Urinalysis Basic - ( 29 Nov 2024 04:41 )    Color: x / Appearance: x / SG: x / pH: x  Gluc: 68 mg/dL / Ketone: x  / Bili: x / Urobili: x   Blood: x / Protein: x / Nitrite: x   Leuk Esterase: x / RBC: x / WBC x   Sq Epi: x / Non Sq Epi: x / Bacteria: x      CAPILLARY BLOOD GLUCOSE        RADIOLOGY & ADDITIONAL TESTS:  Results Reviewed:   Imaging Personally Reviewed:  Electrocardiogram Personally Reviewed:

## 2024-11-29 NOTE — PROCEDURE NOTE - NSCOMPLICATION_GEN_A_CORE
Chart reviewed. Message left for patient to call back regarding request for Lexapro.  
no complications

## 2024-11-29 NOTE — CONSULT NOTE ADULT - ASSESSMENT
79 year old female with dementia, COPD, admitted for acute on chronic respiratory failure with hypoxia    Problem/Recommendation 1: acute on chronic respiratory failure with hypoxia  Exacerbation COPD + asp pna  monitor o2 saturations at rest and exertion  noted CT chest, on abx - steroids, neb tx     Problem/Recommendation 2:AMS Dementia  Patient with mod-severe dementia - Functional Assessment Staging Scale (FAST) 6e   Consider trying to avoid/minimize deliriogenic drugs such as benzodiazepines and anticholinergics   Non pharmacologic options for delirium: Frequently orient patient, identify self, maintain consistent staffing and location   Limit stimulation, soft voice, soft lighting, gentle handling  Sleep disturbance support  Provide adequate sensory aides such as hearing aids, glasses, calendar, clock  Soft warm blankets  Bed alarm for safety    Problem/Recommendation 3: Debility  Assist in ADLS  Maintain safety, fall, aspiration precautions  Set bed alarm, chair alarm for safety and fall prevention  Turn and Position in bed     Problem/Recommendation 4: Palliative Care Encounter  Met with patient at bedside, introduced Palliative Care Team and [facility]. Awake, alert, confused.  Patient with mod-severe dementia - Functional Assessment Staging Scale (FAST) 6e -Unable to independently cook/feed self, dress, bathe, go to bathroom without assistance  Spoke to HCP Gonzalez (son) and patient's daughter. Family inquiring about hospice support at home in addition to current CDPAP aides in place.   Reached out to bryant mitchell from [employer] to coordinate referral for home hospice  Discussed advance directives - Gonzalez is HCP, Gonzalez wishes to complete DNR DNI when he comes to visit tomorrow - wants to hold off on putting it in place now - discussed with him that if she was to sustain cardiopulmonary arrest without it in place the staff may initiate CPR and intubation (including vent support) Gonzalez expressed understanding. At this time remains full code.  DIscussed with Dr. Arango  Will add tylenol if needed for pain  Palliative to follow    Total Time Spent____ minutes  Total time also includes discussion during interdisciplinary team rounds, chart review including but limited to prior admissions/   review of medications/ labs/ imaging, examination, care coordination with other health care professionals, documentation EXCLUDING advance care planning discussions.       COUNSELING:  Face to face meeting to discuss Advanced Care Planning - Time Spent ______Minutes.      Thank you for the opportunity to assist with the care of this patient.   [facility] Palliative Medicine Consult Service [phone].    79 year old female with dementia, COPD, admitted for acute on chronic respiratory failure with hypoxia    Problem/Recommendation 1: acute on chronic respiratory failure with hypoxia  Exacerbation COPD + asp pna  monitor o2 saturations at rest and exertion  noted CT chest, on abx - steroids, neb tx     Problem/Recommendation 2:AMS Dementia  Patient with mod-severe dementia - Functional Assessment Staging Scale (FAST) 6e   Consider trying to avoid/minimize deliriogenic drugs such as benzodiazepines and anticholinergics   Non pharmacologic options for delirium: Frequently orient patient, identify self, maintain consistent staffing and location   Limit stimulation, soft voice, soft lighting, gentle handling  Sleep disturbance support  Provide adequate sensory aides such as hearing aids, glasses, calendar, clock  Soft warm blankets  Bed alarm for safety    Problem/Recommendation 3: Debility  Assist in ADLS  Maintain safety, fall, aspiration precautions  Set bed alarm, chair alarm for safety and fall prevention  Turn and Position in bed     Problem/Recommendation 4: Palliative Care Encounter  Met with patient at bedside, introduced Palliative Care Team and Services at [facility]. Awake, alert, confused.  Patient with mod-severe dementia - Functional Assessment Staging Scale (FAST) 6e -Unable to independently cook/feed self, dress, bathe, go to bathroom without assistance  Spoke to HCP Gonzaelz (son) and patient's daughter. Family inquiring about hospice support at home in addition to current CDPAP aides in place.   Reached out to bryant mitchell from [employer] to coordinate referral for home hospice  Discussed advance directives - Gonzalez is HCP, Gonzalez wishes to complete DNR DNI when he comes to visit tomorrow - wants to hold off on putting it in place now - discussed with him that if she was to sustain cardiopulmonary arrest without it in place the staff may initiate CPR and intubation (including vent support) Gonzalez expressed understanding. At this time remains full code.  DIscussed with Dr. Arango  Will add tylenol if needed for pain  Palliative to follow    Total Time Spent_65___ minutes  Total time also includes discussion during interdisciplinary team rounds, chart review including but limited to prior admissions/   review of medications/ labs/ imaging, examination, care coordination with other health care professionals, documentation EXCLUDING advance care planning discussions.       COUNSELING:  Face to face meeting to discuss Advanced Care Planning - Time Spent __25____Minutes.      Thank you for the opportunity to assist with the care of this patient.   [facility] Palliative Medicine Consult Service [phone].

## 2024-11-29 NOTE — PROGRESS NOTE ADULT - NUTRITIONAL ASSESSMENT
This patient has been assessed with a concern for Malnutrition and has been determined to have a diagnosis/diagnoses of Moderate protein-calorie malnutrition and Underweight (BMI < 19).    This patient is being managed with:   Diet Pureed-  Moderately Thick Liquids (MODTHICKLIQS)  Supplement Feeding Modality:  Oral  Ensure Enlive Cans or Servings Per Day:  4       Frequency:  Three Times a day  Entered: Nov 29 2024 12:35PM

## 2024-11-29 NOTE — DISCHARGE NOTE PROVIDER - NSDCCPCAREPLAN_GEN_ALL_CORE_FT
PRINCIPAL DISCHARGE DIAGNOSIS  Diagnosis: Gram-negative pneumonia  Assessment and Plan of Treatment:       SECONDARY DISCHARGE DIAGNOSES  Diagnosis: Dysphagia  Assessment and Plan of Treatment:     Diagnosis: Anemia  Assessment and Plan of Treatment:      PRINCIPAL DISCHARGE DIAGNOSIS  Diagnosis: Gram-negative pneumonia  Assessment and Plan of Treatment: Continue on the course of antibiotics. Follow up with your primary care physician for further management. For repeat CT in 2 months to assess for resolution of the infiltrate.      SECONDARY DISCHARGE DIAGNOSES  Diagnosis: Dysphagia  Assessment and Plan of Treatment: Continue on the modified consistency diet.    Diagnosis: Anemia  Assessment and Plan of Treatment: Prior transfusion of packed red blood cells. Follow up with your primary care physician for further management.    Diagnosis: Hypothyroidism  Assessment and Plan of Treatment: Continue on levothyroxine.    Diagnosis: Hypertension  Assessment and Plan of Treatment: Benazepril was held due to low blood pressure. Resume the medication if the blood pressure becomes elevated.

## 2024-11-29 NOTE — DISCHARGE NOTE PROVIDER - HOSPITAL COURSE
80yo F PMHx dementia, COPD on home PRN O2, hypothyroidism, and HTN p/w SOB, admitted for workup for acute on chronic resp failure with hypoxia, suspect 2/2 aspiration event. Acute on chronic resp failure 2/2 combination of COPD exac, + super imposed PNA 2/2 aspiration . Initial hypoxia needed NRB, now improved on NC, continues to be tachypnea at times. BNP 2379. CXR neg, CT noted with bronchial thickening and LORI opacity. Initially started on CTX, abx changed to Zosyn given worsneing WBC, to complete total 7 day course. Also noted with dysphagia, seen by speech, rec pureed with mod thick liquid. RVP panel neg, s/p Solumedrol, c/w Duoneb ATC and TTE with hyperdynamic EF. Course c/b anemia, Hg 6/8, attempted to give blood, but pt IV infiltrated -- PA d/w daughter, defer further IV placement and no more blood. Seen by hospice, plan for discharge to home hospice.    80yo F PMHx dementia, COPD on home PRN O2, hypothyroidism, and HTN p/w SOB, admitted for workup for acute on chronic resp failure with hypoxia, suspect 2/2 aspiration event. Acute on chronic resp failure 2/2 combination of COPD exac, + super imposed PNA 2/2 aspiration . Initial hypoxia needed NRB, now improved on NC, continues to be tachypnea at times. BNP 2379. CXR neg, CT noted with bronchial thickening and LORI opacity. Initially started on CTX, abx changed to Zosyn given worsneing WBC, to complete total 7 day course. Also noted with dysphagia, seen by speech, rec pureed with mod thick liquid. RVP panel neg, s/p Solumedrol, c/w Duoneb ATC and TTE with hyperdynamic EF. Course c/b anemia, Hg 6/8, attempted to give blood, but pt IV infiltrated -- PA d/w daughter, defer further IV placement and no more blood. Seen by hospice, plan for discharge to home hospice.         35 minutes total time

## 2024-11-29 NOTE — CHART NOTE - NSCHARTNOTEFT_GEN_A_CORE
RN called to report pt with increased agitation  and refusing IV ABO  Plan of care reviewed with pt per RN.  Pt states she wants to leave   Increasing WBC and ABO switched to Zosyn     Plan:  Zyprexa 2.5mg po ordered   RN to give IV abo after pt calms down
Called to bedside to evaluate IV site. Pt c/o pain w/ blood transfusion. Transfusion stopped and flushed w/ ~20cc of NS w/o resistance or signs of infiltration. Attempted to restart blood but again stopped due to pain. Daughter at bedside requesting to stop blood and remove IV access. Pt is w/o any additional 20g IV sites and family does not want any additional attempts. Risks of stopping transfusion discuss w/ family. They are aware and understanding. Discussed w/ attending.
[facility] [id number]  MIYA WILKINS, 79y, Female  [id number]    Notified by RN that the above patient had a critical value of: Hgb 7.  Patient has no active signs of bleeding. Per prior documentation the patient is scheduled for discharge to hospice and family has declined blood transfusions and IV access.   Will not order any blood products at this time and will continue with current plan of care.       Alyse Mehta NP  Department of Medicine

## 2024-11-29 NOTE — DISCHARGE NOTE PROVIDER - CARE PROVIDER_API CALL
PCP,   Phone: (   )    -  Fax: (   )    -  Follow Up Time:    David Panchal  Internal Medicine  [address] [place] [id number]  Phone: [phone]  Fax: [phone]  Follow Up Time:

## 2024-11-30 ENCOUNTER — TRANSCRIPTION ENCOUNTER (OUTPATIENT)
Age: 79
End: 2024-11-30

## 2024-11-30 PROCEDURE — 99232 SBSQ HOSP IP/OBS MODERATE 35: CPT

## 2024-11-30 RX ORDER — AMOXICILLIN/POTASSIUM CLAV 250-125 MG
875 TABLET ORAL
Qty: 4 | Refills: 0
Start: 2024-11-30 | End: 2024-12-01

## 2024-11-30 RX ORDER — LORAZEPAM 2 MG/1
1 TABLET ORAL
Qty: 12 | Refills: 0
Start: 2024-11-30 | End: 2024-12-02

## 2024-11-30 RX ORDER — PROCHLORPERAZINE EDISYLATE 5 MG/ML
1 INJECTION INTRAMUSCULAR; INTRAVENOUS
Qty: 6 | Refills: 0
Start: 2024-11-30 | End: 2024-12-02

## 2024-11-30 RX ORDER — IPRATROPIUM BROMIDE AND ALBUTEROL SULFATE 2.5; .5 MG/3ML; MG/3ML
3 SOLUTION RESPIRATORY (INHALATION) EVERY 6 HOURS
Refills: 0 | Status: DISCONTINUED | OUTPATIENT
Start: 2024-11-30 | End: 2024-12-01

## 2024-11-30 RX ADMIN — Medication 1 TABLET(S): at 22:13

## 2024-11-30 RX ADMIN — LORAZEPAM 1 MILLIGRAM(S): 2 TABLET ORAL at 09:36

## 2024-11-30 RX ADMIN — Medication 1 TABLET(S): at 06:25

## 2024-11-30 RX ADMIN — Medication 75 MICROGRAM(S): at 06:25

## 2024-11-30 RX ADMIN — Medication 10 MILLIGRAM(S): at 22:09

## 2024-11-30 RX ADMIN — TRAZODONE HYDROCHLORIDE 50 MILLIGRAM(S): 150 TABLET ORAL at 22:09

## 2024-11-30 RX ADMIN — Medication 1 TABLET(S): at 13:02

## 2024-11-30 RX ADMIN — Medication 81 MILLIGRAM(S): at 13:02

## 2024-11-30 NOTE — DISCHARGE NOTE NURSING/CASE MANAGEMENT/SOCIAL WORK - NSDCPEFALRISK_GEN_ALL_CORE
For information on Fall & Injury Prevention, visit: [email or web address] OR  [email or web address] OR  https://www.cdc.gov/steadi/patient.html

## 2024-11-30 NOTE — DISCHARGE NOTE NURSING/CASE MANAGEMENT/SOCIAL WORK - FINANCIAL ASSISTANCE
[facility] provides services at a reduced cost to those who are determined to be eligible through [facility]’s financial assistance program. Information regarding [facility]’s financial assistance program can be found by going to [email or web address] or by calling [phone].

## 2024-11-30 NOTE — DISCHARGE NOTE NURSING/CASE MANAGEMENT/SOCIAL WORK - PATIENT PORTAL LINK FT
You can access the FollowMyHealth Patient Portal offered by [facility] by registering at the following website: [email or web address]. By joining [employer]’s FollowMyHealth portal, you will also be able to view your health information using other applications (apps) compatible with our system.

## 2024-11-30 NOTE — PROGRESS NOTE ADULT - SUBJECTIVE AND OBJECTIVE BOX
MIYA CLAUDETTE  ----------------------------------------  The patient was seen earlier at bedside. Patient with pneumonia and hypoxia. Offered no complaints. Denied chest pain or dyspnea.    Vital Signs Last 24 Hrs  T(C): 36.8 (30 Nov 2024 08:09), Max: 37.1 (29 Nov 2024 15:19)  T(F): 98.2 (30 Nov 2024 08:09), Max: 98.8 (29 Nov 2024 15:19)  HR: 99 (30 Nov 2024 08:09) (89 - 110)  BP: 128/74 (30 Nov 2024 08:09) (102/65 - 137/87)  BP(mean): 92 (30 Nov 2024 08:09) (90 - 104)  RR: 20 (30 Nov 2024 08:09) (16 - 20)  SpO2: 100% (30 Nov 2024 08:09) (93% - 100%)    Parameters below as of 30 Nov 2024 08:09  Patient On (Oxygen Delivery Method): nasal cannula  O2 Flow (L/min): 3    PHYSICAL EXAMINATION:  ----------------------------------------  General appearance: No acute distress, Awake, Alert  HEENT: Normocephalic, Atraumatic, Conjunctiva clear, EOMI  Neck: Supple, No JVD  Lungs: Clear to auscultation, Breath sound equal bilaterally  Cardiovascular: S1S2, Regular rhythm  Abdomen: Soft, Nontender, Positive bowel sounds  Extremities: No clubbing, No cyanosis, No edema    LABORATORY STUDIES:  ----------------------------------------             7.0    14.03 )-----------( 228      ( 29 Nov 2024 22:16 )             22.8     11-29    146[H]  |  105  |  21.5[H]  ----------------------------<  68[L]  4.0   |  32.0[H]  |  0.88    Ca    9.8      29 Nov 2024 04:41                  Urinalysis Basic - ( 29 Nov 2024 04:41 )    Color: x / Appearance: x / SG: x / pH: x  Gluc: 68 mg/dL / Ketone: x  / Bili: x / Urobili: x   Blood: x / Protein: x / Nitrite: x   Leuk Esterase: x / RBC: x / WBC x   Sq Epi: x / Non Sq Epi: x / Bacteria: x          MEDICATIONS  (STANDING):  albuterol/ipratropium for Nebulization 3 milliLiter(s) Nebulizer every 6 hours  amoxicillin  500 milliGRAM(s)/clavulanate 1 Tablet(s) Oral every 8 hours  aspirin enteric coated 81 milliGRAM(s) Oral daily  atorvastatin 10 milliGRAM(s) Oral at bedtime  levothyroxine 75 MICROGram(s) Oral daily  pantoprazole  Injectable 40 milliGRAM(s) IV Push two times a day  traZODone 50 milliGRAM(s) Oral at bedtime    MEDICATIONS  (PRN):  acetaminophen     Tablet .. 650 milliGRAM(s) Oral every 6 hours PRN Mild Pain (1 - 3)  LORazepam     Tablet 1 milliGRAM(s) Oral three times a day PRN for anxiety      ASSESSMENT / PLAN:  ----------------------------------------  79F with a history of dementia, COPD, hypothyroidism, and hypertension, who presented with dyspnea and hypoxia and thought to have aspiration pneumonia. She was treated with antibiotics and also had transfusion of packed red blood cells for anemia.    Acute on chronic hypoxic respiratory failure / COPD exacerbation / Pneumonia  - For titration of on supplemental oxygen as tolerated  - Nebulizer treatments as needed    Dementia  - Continue supportive care  - On trazodone at night    Anemia  - Transfusion of packed red blood cells noted    Hypercalcemia  - Intact PTH and Vitamin D levels were within normal range  - Calcium level improved on repeat studies  - PTH related peptide results to be reviewed when available    Hypertension  - Close blood pressure monitoring  - Antihypertensive medications held due to relatively low blood pressures    Hypothyroidism  - On levothyroxine    Prior discussion with family noted, for home hospice care        Dispo: For discharge home with hospice care MIYA CLAUDETTE  ----------------------------------------  The patient was seen earlier at bedside. Patient with pneumonia and hypoxia. Offered no complaints. Denied chest pain or dyspnea.    Vital Signs Last 24 Hrs  T(C): 36.8 (30 Nov 2024 08:09), Max: 37.1 (29 Nov 2024 15:19)  T(F): 98.2 (30 Nov 2024 08:09), Max: 98.8 (29 Nov 2024 15:19)  HR: 99 (30 Nov 2024 08:09) (89 - 110)  BP: 128/74 (30 Nov 2024 08:09) (102/65 - 137/87)  BP(mean): 92 (30 Nov 2024 08:09) (90 - 104)  RR: 20 (30 Nov 2024 08:09) (16 - 20)  SpO2: 100% (30 Nov 2024 08:09) (93% - 100%)    Parameters below as of 30 Nov 2024 08:09  Patient On (Oxygen Delivery Method): nasal cannula  O2 Flow (L/min): 3    PHYSICAL EXAMINATION:  ----------------------------------------  General appearance: No acute distress, Awake, Alert  HEENT: Normocephalic, Atraumatic, Conjunctiva clear, EOMI  Neck: Supple, No JVD  Lungs: Clear to auscultation, Breath sound equal bilaterally  Cardiovascular: S1S2, Regular rhythm  Abdomen: Soft, Nontender, Positive bowel sounds  Extremities: No clubbing, No cyanosis, No edema    LABORATORY STUDIES:  ----------------------------------------             7.0    14.03 )-----------( 228      ( 29 Nov 2024 22:16 )             22.8     11-29    146[H]  |  105  |  21.5[H]  ----------------------------<  68[L]  4.0   |  32.0[H]  |  0.88    Ca    9.8      29 Nov 2024 04:41                  Urinalysis Basic - ( 29 Nov 2024 04:41 )    Color: x / Appearance: x / SG: x / pH: x  Gluc: 68 mg/dL / Ketone: x  / Bili: x / Urobili: x   Blood: x / Protein: x / Nitrite: x   Leuk Esterase: x / RBC: x / WBC x   Sq Epi: x / Non Sq Epi: x / Bacteria: x          MEDICATIONS  (STANDING):  albuterol/ipratropium for Nebulization 3 milliLiter(s) Nebulizer every 6 hours  amoxicillin  500 milliGRAM(s)/clavulanate 1 Tablet(s) Oral every 8 hours  aspirin enteric coated 81 milliGRAM(s) Oral daily  atorvastatin 10 milliGRAM(s) Oral at bedtime  levothyroxine 75 MICROGram(s) Oral daily  pantoprazole  Injectable 40 milliGRAM(s) IV Push two times a day  traZODone 50 milliGRAM(s) Oral at bedtime    MEDICATIONS  (PRN):  acetaminophen     Tablet .. 650 milliGRAM(s) Oral every 6 hours PRN Mild Pain (1 - 3)  LORazepam     Tablet 1 milliGRAM(s) Oral three times a day PRN for anxiety      ASSESSMENT / PLAN:  ----------------------------------------  79F with a history of dementia, COPD, hypothyroidism, and hypertension, who presented with dyspnea and hypoxia and thought to have aspiration pneumonia. She was treated with antibiotics and also had transfusion of packed red blood cells for anemia.    Acute on chronic hypoxic respiratory failure / COPD exacerbation / Pneumonia  - For titration of on supplemental oxygen as tolerated  - Nebulizer treatments as needed  - Modified consistency diet for dysphagia as per Speech Pathology    Dementia  - Continue supportive care  - On trazodone at night    Anemia  - Transfusion of packed red blood cells noted    Hypercalcemia  - Intact PTH and Vitamin D levels were within normal range  - Calcium level improved on repeat studies  - PTH related peptide results to be reviewed when available    Hypertension  - Close blood pressure monitoring  - Antihypertensive medications held due to relatively low blood pressures    Hypothyroidism  - On levothyroxine    Prior discussion with family noted, for home hospice care        Dispo: For discharge home with hospice care

## 2024-12-01 VITALS
TEMPERATURE: 98 F | HEART RATE: 91 BPM | SYSTOLIC BLOOD PRESSURE: 107 MMHG | OXYGEN SATURATION: 96 % | RESPIRATION RATE: 18 BRPM | DIASTOLIC BLOOD PRESSURE: 70 MMHG

## 2024-12-01 PROCEDURE — 82306 VITAMIN D 25 HYDROXY: CPT

## 2024-12-01 PROCEDURE — 87637 SARSCOV2&INF A&B&RSV AMP PRB: CPT

## 2024-12-01 PROCEDURE — 92526 ORAL FUNCTION THERAPY: CPT

## 2024-12-01 PROCEDURE — 83605 ASSAY OF LACTIC ACID: CPT

## 2024-12-01 PROCEDURE — 83880 ASSAY OF NATRIURETIC PEPTIDE: CPT

## 2024-12-01 PROCEDURE — 83519 RIA NONANTIBODY: CPT

## 2024-12-01 PROCEDURE — 94640 AIRWAY INHALATION TREATMENT: CPT

## 2024-12-01 PROCEDURE — 85027 COMPLETE CBC AUTOMATED: CPT

## 2024-12-01 PROCEDURE — 85025 COMPLETE CBC W/AUTO DIFF WBC: CPT

## 2024-12-01 PROCEDURE — 94660 CPAP INITIATION&MGMT: CPT

## 2024-12-01 PROCEDURE — 82330 ASSAY OF CALCIUM: CPT

## 2024-12-01 PROCEDURE — 96374 THER/PROPH/DIAG INJ IV PUSH: CPT

## 2024-12-01 PROCEDURE — 83970 ASSAY OF PARATHORMONE: CPT

## 2024-12-01 PROCEDURE — 86900 BLOOD TYPING SEROLOGIC ABO: CPT

## 2024-12-01 PROCEDURE — 82746 ASSAY OF FOLIC ACID SERUM: CPT

## 2024-12-01 PROCEDURE — 71275 CT ANGIOGRAPHY CHEST: CPT | Mod: MC

## 2024-12-01 PROCEDURE — 0241U: CPT

## 2024-12-01 PROCEDURE — 84295 ASSAY OF SERUM SODIUM: CPT

## 2024-12-01 PROCEDURE — 82310 ASSAY OF CALCIUM: CPT

## 2024-12-01 PROCEDURE — 86901 BLOOD TYPING SEROLOGIC RH(D): CPT

## 2024-12-01 PROCEDURE — 94760 N-INVAS EAR/PLS OXIMETRY 1: CPT

## 2024-12-01 PROCEDURE — 82728 ASSAY OF FERRITIN: CPT

## 2024-12-01 PROCEDURE — 82652 VIT D 1 25-DIHYDROXY: CPT

## 2024-12-01 PROCEDURE — 92610 EVALUATE SWALLOWING FUNCTION: CPT

## 2024-12-01 PROCEDURE — 93306 TTE W/DOPPLER COMPLETE: CPT

## 2024-12-01 PROCEDURE — 82803 BLOOD GASES ANY COMBINATION: CPT

## 2024-12-01 PROCEDURE — 93005 ELECTROCARDIOGRAM TRACING: CPT

## 2024-12-01 PROCEDURE — 80053 COMPREHEN METABOLIC PANEL: CPT

## 2024-12-01 PROCEDURE — 36415 COLL VENOUS BLD VENIPUNCTURE: CPT

## 2024-12-01 PROCEDURE — 83540 ASSAY OF IRON: CPT

## 2024-12-01 PROCEDURE — 84132 ASSAY OF SERUM POTASSIUM: CPT

## 2024-12-01 PROCEDURE — 82947 ASSAY GLUCOSE BLOOD QUANT: CPT

## 2024-12-01 PROCEDURE — 36430 TRANSFUSION BLD/BLD COMPNT: CPT

## 2024-12-01 PROCEDURE — 86850 RBC ANTIBODY SCREEN: CPT

## 2024-12-01 PROCEDURE — 83550 IRON BINDING TEST: CPT

## 2024-12-01 PROCEDURE — 99239 HOSP IP/OBS DSCHRG MGMT >30: CPT

## 2024-12-01 PROCEDURE — 85014 HEMATOCRIT: CPT

## 2024-12-01 PROCEDURE — P9016: CPT

## 2024-12-01 PROCEDURE — 86923 COMPATIBILITY TEST ELECTRIC: CPT

## 2024-12-01 PROCEDURE — 0225U NFCT DS DNA&RNA 21 SARSCOV2: CPT

## 2024-12-01 PROCEDURE — 97163 PT EVAL HIGH COMPLEX 45 MIN: CPT

## 2024-12-01 PROCEDURE — 82607 VITAMIN B-12: CPT

## 2024-12-01 PROCEDURE — 85018 HEMOGLOBIN: CPT

## 2024-12-01 PROCEDURE — 99291 CRITICAL CARE FIRST HOUR: CPT

## 2024-12-01 PROCEDURE — 80048 BASIC METABOLIC PNL TOTAL CA: CPT

## 2024-12-01 PROCEDURE — 96375 TX/PRO/DX INJ NEW DRUG ADDON: CPT

## 2024-12-01 PROCEDURE — 84466 ASSAY OF TRANSFERRIN: CPT

## 2024-12-01 PROCEDURE — 71045 X-RAY EXAM CHEST 1 VIEW: CPT

## 2024-12-01 PROCEDURE — 82435 ASSAY OF BLOOD CHLORIDE: CPT

## 2024-12-01 RX ADMIN — Medication 75 MICROGRAM(S): at 05:27

## 2024-12-01 RX ADMIN — Medication 1 TABLET(S): at 05:27

## 2024-12-01 NOTE — PROGRESS NOTE ADULT - SUBJECTIVE AND OBJECTIVE BOX
MIYA CLAUDETTE  ----------------------------------------  The patient was seen earlier at bedside. Patient with pneumonia. Comfortable. No respiratory distress.    Vital Signs Last 24 Hrs  T(C): 36.4 (01 Dec 2024 07:45), Max: 36.7 (30 Nov 2024 17:27)  T(F): 97.6 (01 Dec 2024 07:45), Max: 98.1 (30 Nov 2024 17:27)  HR: 80 (01 Dec 2024 07:45) (74 - 91)  BP: 100/60 (01 Dec 2024 07:45) (100/60 - 116/75)  BP(mean): 89 (30 Nov 2024 22:07) (86 - 89)  RR: 18 (01 Dec 2024 07:45) (18 - 18)  SpO2: 95% (01 Dec 2024 07:45) (95% - 99%)    Parameters below as of 01 Dec 2024 07:45  Patient On (Oxygen Delivery Method): nasal cannula  O2 Flow (L/min): 3    PHYSICAL EXAMINATION:  ----------------------------------------  General appearance: No acute distress, Somnolent but awakened with verbal prompts  HEENT: Normocephalic, Atraumatic, Conjunctiva clear  Neck: Supple, No JVD  Lungs: Clear to auscultation, Breath sound equal bilaterally  Cardiovascular: S1S2, Regular rhythm  Abdomen: Soft, Nontender, Positive bowel sounds  Extremities: No clubbing, No cyanosis, No edema    LABORATORY STUDIES:  ----------------------------------------             7.0    14.03 )-----------( 228      ( 29 Nov 2024 22:16 )             22.8     MEDICATIONS  (STANDING):  amoxicillin  500 milliGRAM(s)/clavulanate 1 Tablet(s) Oral every 8 hours  aspirin enteric coated 81 milliGRAM(s) Oral daily  atorvastatin 10 milliGRAM(s) Oral at bedtime  levothyroxine 75 MICROGram(s) Oral daily  pantoprazole  Injectable 40 milliGRAM(s) IV Push two times a day  traZODone 50 milliGRAM(s) Oral at bedtime    MEDICATIONS  (PRN):  acetaminophen     Tablet .. 650 milliGRAM(s) Oral every 6 hours PRN Mild Pain (1 - 3)  albuterol/ipratropium for Nebulization 3 milliLiter(s) Nebulizer every 6 hours PRN Shortness of Breath and/or Wheezing      ASSESSMENT / PLAN:  ----------------------------------------  79F with a history of dementia, COPD, hypothyroidism, and hypertension, who presented with dyspnea and hypoxia and thought to have aspiration pneumonia. She was treated with antibiotics and also had transfusion of packed red blood cells for anemia.    Acute on chronic hypoxic respiratory failure / COPD exacerbation / Pneumonia  - For titration of supplemental oxygen if tolerated  - Modified consistency diet for dysphagia as per Speech Pathology    Dementia  - Continue supportive care  - On trazodone at night    Anemia  - Prior transfusion of packed red blood cells noted    Hypercalcemia  - Intact PTH and Vitamin D levels were within normal range  - Calcium level improved on repeat studies  - PTH related peptide results to be reviewed when available    Hypertension  - Close blood pressure monitoring  - Antihypertensive medications held due to relatively low blood pressures    Hypothyroidism  - On levothyroxine    Prior discussion with family noted, for home hospice care      Dispo: For discharge home with hospice

## 2024-12-02 RX ORDER — LEVOTHYROXINE SODIUM 150 MCG
1 TABLET ORAL
Refills: 0 | DISCHARGE

## 2024-12-02 RX ORDER — LORAZEPAM 2 MG/1
1 TABLET ORAL
Refills: 0 | DISCHARGE

## 2024-12-02 RX ORDER — BENAZEPRIL HCL 10 MG
1 TABLET ORAL
Refills: 0 | DISCHARGE

## 2024-12-04 LAB — PTH RELATED PROT SERPL-MCNC: <2 PMOL/L — SIGNIFICANT CHANGE UP

## (undated) DEVICE — FRAZIER SUCTION TIP 10FR

## (undated) DEVICE — DRAPE ISOLATION W IOBAN & POUCH

## (undated) DEVICE — SUT MONOCRYL 3-0 27" PS-2 UNDYED

## (undated) DEVICE — DRSG WEBRIL 6"

## (undated) DEVICE — DRAPE SPLIT SHEETS 77X108"

## (undated) DEVICE — PACK MINOR WITH LAP

## (undated) DEVICE — ELCTR GROUNDING PAD ADULT COVIDIEN

## (undated) DEVICE — GLV 8 ESTEEM BLUE

## (undated) DEVICE — DRAPE TOWEL BLUE 17" X 24"

## (undated) DEVICE — GLV 7.5 PROTEXIS

## (undated) DEVICE — SUT VICRYL PLUS 0 27" CT-2 UNDYED

## (undated) DEVICE — WRAP COMPRESSION CALF MED

## (undated) DEVICE — SUT MONOCRYL 2-0 27" SH UNDYED